# Patient Record
Sex: FEMALE | Race: BLACK OR AFRICAN AMERICAN | Employment: UNEMPLOYED | RURAL
[De-identification: names, ages, dates, MRNs, and addresses within clinical notes are randomized per-mention and may not be internally consistent; named-entity substitution may affect disease eponyms.]

---

## 2017-01-23 ENCOUNTER — TELEPHONE (OUTPATIENT)
Dept: FAMILY MEDICINE CLINIC | Age: 57
End: 2017-01-23

## 2017-01-23 DIAGNOSIS — M25.569 KNEE PAIN, UNSPECIFIED CHRONICITY, UNSPECIFIED LATERALITY: Primary | ICD-10-CM

## 2017-01-23 NOTE — TELEPHONE ENCOUNTER
Pt needs \"shots in her knees\". She has seen Dr. Aiden Apodaca. See would like a referral. Please advise.

## 2017-03-03 ENCOUNTER — OFFICE VISIT (OUTPATIENT)
Dept: FAMILY MEDICINE CLINIC | Age: 57
End: 2017-03-03

## 2017-03-03 VITALS
SYSTOLIC BLOOD PRESSURE: 136 MMHG | DIASTOLIC BLOOD PRESSURE: 80 MMHG | HEIGHT: 64 IN | TEMPERATURE: 97.8 F | RESPIRATION RATE: 20 BRPM | BODY MASS INDEX: 50.02 KG/M2 | WEIGHT: 293 LBS | OXYGEN SATURATION: 98 % | HEART RATE: 83 BPM

## 2017-03-03 DIAGNOSIS — R06.2 WHEEZING: ICD-10-CM

## 2017-03-03 DIAGNOSIS — J02.9 SORE THROAT: Primary | ICD-10-CM

## 2017-03-03 PROBLEM — J45.20 MILD INTERMITTENT ASTHMA WITHOUT COMPLICATION: Status: ACTIVE | Noted: 2017-03-03

## 2017-03-03 LAB
S PYO AG THROAT QL: NEGATIVE
VALID INTERNAL CONTROL?: YES

## 2017-03-03 RX ORDER — BENZONATATE 200 MG/1
200 CAPSULE ORAL
Qty: 30 CAP | Refills: 3 | Status: SHIPPED | OUTPATIENT
Start: 2017-03-03 | End: 2017-06-27 | Stop reason: SDUPTHER

## 2017-03-03 RX ORDER — QUINAPRIL 20 MG/1
20 TABLET ORAL
Qty: 30 TAB | Refills: 6 | Status: SHIPPED | OUTPATIENT
Start: 2017-03-03 | End: 2017-11-08 | Stop reason: SDUPTHER

## 2017-03-03 RX ORDER — FLUTICASONE PROPIONATE AND SALMETEROL 100; 50 UG/1; UG/1
1 POWDER RESPIRATORY (INHALATION) 2 TIMES DAILY
Qty: 1 INHALER | Refills: 0 | Status: SHIPPED | COMMUNITY
Start: 2017-03-03 | End: 2018-04-14

## 2017-03-03 RX ORDER — ALBUTEROL SULFATE 0.83 MG/ML
2.5 SOLUTION RESPIRATORY (INHALATION) ONCE
Qty: 1 EACH | Refills: 0
Start: 2017-03-03 | End: 2017-03-03

## 2017-03-03 RX ORDER — IPRATROPIUM BROMIDE 0.5 MG/2.5ML
0.5 SOLUTION RESPIRATORY (INHALATION) AS NEEDED
Qty: 2.5 ML | Refills: 0
Start: 2017-03-03 | End: 2018-01-10 | Stop reason: ALTCHOICE

## 2017-03-03 NOTE — PATIENT INSTRUCTIONS
Fluticasone/Salmeterol (By breathing)   Fluticasone Propionate (jvla-JPR-y-sone TCBT-lxb-bn-cece), Salmeterol Xinafoate (francisco-ME-ter-ol roe-GUC-fz-ate)  Prevents symptoms of asthma and chronic obstructive pulmonary disease (COPD). This medicine contains a corticosteroid. Brand Name(s):Advair Diskus 100/50, Advair Diskus 250/50, Advair Diskus 500/50, Advair /21, Advair /21, Advair HFA 45/21   There may be other brand names for this medicine. When This Medicine Should Not Be Used: This medicine is not right for everyone. Do not use this medicine if you had an allergic reaction to salmeterol, fluticasone, or milk proteins. Do not use this medicine to treat an asthma attack or a COPD flare-up. How to Use This Medicine:   Liquid Under Pressure, Powder Under Pressure, Disk  · Take your medicine as directed. Your dose may need to be changed several times to find what works best for you. Never use more medicine than your doctor prescribed. · This medicine should come with a Medication Guide. Ask your pharmacist for a copy if you do not have one. · Advair Diskus®:   ¨ The powder medicine is held in a foil blister package inside the Diskus® inhaler. The Diskus® pokes a hole in each blister one at a time when you push the lever. ¨ Do not use a spacer with the Diskus®. ¨ Keep the Diskus® inhaler closed when you are not using it. Keep the inhaler dry at all times. Do not blow into it. ¨ Before you inhale your dose, breathe out fully, trying to get as much air out of the lungs as possible. Hold the Diskus® level and away from your mouth. ¨ Open your mouth and breathe in quickly and deeply through the Diskus®. Do not breathe in through your nose. ¨ Remove the Diskus® from your mouth. Hold your breath for about 10 seconds or as long as possible, then breathe out slowly.   ¨ Throw the inhaler away 1 month after you remove it from the foil pouch or when the dose indicator reaches 0.  · Advair® HFA:   ¨ You will use this medicine with a device called a metered-dose inhaler. The inhaler fits on the medicine canister and turns the medicine into a fine spray that you breathe in through your mouth and to your lungs. You may be told to use a spacer, which is a tube that is placed between the inhaler and your mouth. Your caregiver will show you how to use your inhaler and the spacer (if needed). ¨ Shake the inhaler well just before each use. Avoid spraying this medicine into your eyes. ¨ Prime the inhaler before you use it for the first time. Shake well, then spray into the air, away from your face. Shake and spray a total of 4 times. If the inhaler has been dropped or has not been used for more than 4 weeks, do 2 sprays into the air before use. ¨ To inhale this medicine, breathe out fully, trying to get as much air out of the lungs as possible. Put the mouthpiece just in front of your mouth with the canister upright. ¨ Open your mouth and breathe in slowly and deeply (like yawning), and at the same time firmly press down on the top of the canister once. ¨ Hold your breath for about 5 to 10 seconds, and then breathe out slowly. ¨ If you are supposed to use more than one puff, wait 1 to 2 minutes before inhaling the second puff. Repeat these steps for the next puff, starting with shaking the inhaler. ¨ Throw away the inhaler when the dose counter reaches 000. · Rinse your mouth out with water after you inhale your medicine. Do not swallow the water. · Missed dose: Take a dose as soon as you remember. If it is almost time for your next dose, wait until then and take a regular dose. Do not take extra medicine to make up for a missed dose. · Keep the medicine in the foil pouch until you are ready to use it. Store at room temperature, away from heat and direct light. Do not freeze. · Store the canister at room temperature, away from heat and direct light. Do not freeze.  Do not keep this medicine inside a car where it could be exposed to extreme heat or cold. Do not poke holes in the canister or throw it into a fire, even if the canister is empty. · Ask your pharmacist, doctor, or health caregiver about the best way to dispose of the used medicine container and any leftover medicine. You will also need to throw away old medicine after the expiration date has passed. Drugs and Foods to Avoid:   Ask your doctor or pharmacist before using any other medicine, including over-the-counter medicines, vitamins, and herbal products. · Some foods and medicines can affect how fluticasone/salmeterol works. Tell your doctor if you have used medicine for depression or an MAO inhibitor within the past 2 weeks. · Tell your doctor if you are using any of the following:  ¨ Medicine to treat an infection (such as clarithromycin, itraconazole, ketoconazole, telithromycin)  ¨ Medicine to treat HIV or AIDS (such as atazanavir, indinavir, nelfinavir, ritonavir, saquinavir)  ¨ Diuretic (water pill)  ¨ Seizure medicine  ¨ Beta-blocker blood pressure medicine  Warnings While Using This Medicine:   · Tell your doctor if you are pregnant or breastfeeding, or if you have liver disease, heart disease, high blood pressure, heart rhythm problems, thyroid problems, seizures, osteoporosis, diabetes, cataracts, glaucoma. Tell your doctor about any immune system problems or infections, including herpes simplex in your eye, tuberculosis, or parasites.   · This medicine may cause the following problems:  ¨ Higher risk of asthma-related hospital stays and death  ¨ Increased trouble breathing right after use (paradoxical bronchospasm)  ¨ Higher risk of pneumonia in people who have COPD  ¨ Higher risk of infection, including fungus infection in the mouth (thrush)  ¨ Low bone mineral density, which may lead to osteoporosis  ¨ Cataracts, glaucoma, or other vision problems  ¨ Slow growth in children  ¨ Problems with the adrenal glands  · This medicine will not stop an asthma attack that has already started. You should have another medicine to use in case of an acute asthma attack or for sudden COPD flare-ups. · If any of your asthma medicines do not seem to be working as well as usual, call your doctor right away. Do not change your doses or stop using your medicines without asking your doctor. · Tell your doctor right away if you have been exposed to chickenpox or measles. · Call your doctor if your symptoms do not improve or if they get worse. · Tell any doctor or dentist who treats you that you are using this medicine. · Your doctor will check your progress and the effects of this medicine at regular visits. Keep all appointments. · Keep all medicine out of the reach of children. Never share your medicine with anyone. Possible Side Effects While Using This Medicine:   Call your doctor right away if you notice any of these side effects:  · Allergic reaction: Itching or hives, swelling in your face or hands, swelling or tingling in your mouth or throat, chest tightness, trouble breathing  · Chest pain, trouble breathing  · Dry mouth, increased thirst, muscle cramps, nausea or vomiting, uneven heartbeat  · Eye pain or trouble seeing  · Fast, pounding, or uneven heartbeat  · Fever, chills, cough, runny or stuffy nose, sore throat, and body aches  · Tremors, nervousness, or shaking  · Unusual tiredness or weakness  · Worse breathing problems  If you notice these less serious side effects, talk with your doctor:   · Headache  · Hoarseness, voice changes, sore throat  · Runny or stuffy nose  · White patches inside the mouth or throat  If you notice other side effects that you think are caused by this medicine, tell your doctor. Call your doctor for medical advice about side effects.  You may report side effects to FDA at 0-289-FDA-7538  © 2016 [address] is for End User's use only and may not be sold, redistributed or otherwise used for commercial purposes. The above information is an  only. It is not intended as medical advice for individual conditions or treatments. Talk to your doctor, nurse or pharmacist before following any medical regimen to see if it is safe and effective for you.

## 2017-03-03 NOTE — MR AVS SNAPSHOT
Visit Information Date & Time Provider Department Dept. Phone Encounter #  
 3/3/2017  9:00 AM Sumeet Diana NP [place] [id number] [facility] [phone] [id number] Follow-up Instructions Return if symptoms worsen or fail to improve. Upcoming Health Maintenance Date Due Hepatitis C Screening 1960 EYE EXAM RETINAL OR DILATED Q1 12/13/1969 Pneumococcal 19-64 Medium Risk (1 of 1 - PPSV23) 12/13/1978 DTaP/Tdap/Td series (1 - Tdap) 12/13/1980 PAP AKA CERVICAL CYTOLOGY 12/13/1980 INFLUENZA AGE 9 TO ADULT 8/1/2016 HEMOGLOBIN A1C Q6M 2/5/2017 FOOT EXAM Q1 8/5/2017 MICROALBUMIN Q1 8/5/2017 LIPID PANEL Q1 8/5/2017 FOBT Q 1 YEAR AGE 50-75 8/22/2017 BREAST CANCER SCRN MAMMOGRAM 1/31/2019 Allergies as of 3/3/2017  Review Complete On: 3/3/2017 By: Edita Kim RN No Known Allergies Current Immunizations  Never Reviewed No immunizations on file. Not reviewed this visit You Were Diagnosed With   
  
 Codes Comments Sore throat    -  Primary ICD-10-CM: J02.9 ICD-9-CM: 519 Wheezing     ICD-10-CM: R06.2 ICD-9-CM: 786.07 Vitals BP  
  
  
  
  
  
 136/80 (BP 1 Location: Left arm, BP Patient Position: Sitting) Vitals History BMI and BSA Data Body Mass Index Body Surface Area  
 53.69 kg/m 2 2.53 m 2 Preferred Pharmacy Pharmacy Name Phone [facility] [place] [address] [phone] Your Updated Medication List  
  
   
This list is accurate as of: 3/3/17 10:19 AM.  Always use your most recent med list.  
  
  
  
  
 * albuterol 90 mcg/actuation inhaler Commonly known as:  PROVENTIL HFA, VENTOLIN HFA, PROAIR HFA Take 2 Puffs by inhalation every four (4) hours as needed for Wheezing or Shortness of Breath. * albuterol 2.5 mg /3 mL (0.083 %) nebulizer solution Commonly known as:  PROVENTIL VENTOLIN  
 3 mL by Nebulization route once for 1 dose. benzonatate 200 mg capsule Commonly known as:  TESSALON Take 1 Cap by mouth three (3) times daily as needed for Cough for up to 7 days. dilTIAZem  mg ER capsule Commonly known as:  CARDIZEM CD Take 1 Cap by mouth daily. ferrous sulfate 325 mg (65 mg iron) tablet Take 1 Tab by mouth Daily (before breakfast). Indications: IRON DEFICIENCY ANEMIA  
  
 fluticasone 50 mcg/actuation nasal spray Commonly known as:  FLONASE  
2 sprays per nostril daily  
  
 hydroCHLOROthiazide 25 mg tablet Commonly known as:  HYDRODIURIL  
TAKE ONE TABLET BY MOUTH EVERY DAY  
  
 ipratropium 0.02 % nebulizer solution Commonly known as:  ATROVENT  
2.5 mL by Nebulization route as needed for Wheezing. Indications: MAINTENANCE THERAPY FOR ASTHMA  
  
 metFORMIN 500 mg tablet Commonly known as:  GLUCOPHAGE Take 1 Tab by mouth two (2) times daily (with meals). montelukast 10 mg tablet Commonly known as:  SINGULAIR Take 1 Tab by mouth daily. quinapril 20 mg tablet Commonly known as:  ACCUPRIL Take 1 Tab by mouth nightly. Indications: hypertension * Notice: This list has 2 medication(s) that are the same as other medications prescribed for you. Read the directions carefully, and ask your doctor or other care provider to review them with you. Prescriptions Sent to Pharmacy Refills  
 quinapril (ACCUPRIL) 20 mg tablet 6 Sig: Take 1 Tab by mouth nightly. Indications: hypertension Class: Normal  
 Pharmacy: [address] [place] [address], [address] [place] [address] [facility]: [phone] Route: Oral  
 benzonatate (TESSALON) 200 mg capsule 3 Sig: Take 1 Cap by mouth three (3) times daily as needed for Cough for up to 7 days. Class: Normal  
 Pharmacy: [address] [facility], [address] [place] [address] [address] Ph #: [phone] Route: Oral  
  
We Performed the Following ALBUTEROL, INHAL. SOL., FDA-APPROVED FINAL, NON-COMPOUND UNIT DOSE, 1 MG [ [facility]] AMB POC RAPID STREP A [48459 CPT(R)] INHAL RX, AIRWAY OBST/DX SPUTUM INDUCT [id number] CPT(R)] IPRATROPIUM BROMIDE NON-COMP [ [facility]] [place] INHAL RX, AIRWAY OBST/DX SPUTUM INDUCT [id number] CPT(R)] Follow-up Instructions Return if symptoms worsen or fail to improve. Introducing [facility]! [facility] introduces [employer] patient portal. Now you can access parts of your medical record, email your doctor's office, and request medication refills online. 1. In your internet browser, go to [email or web address] 2. Click on the First Time User? Click Here link in the Sign In box. You will see the New Member Sign Up page. 3. Enter your ZeaVision Access Code exactly as it appears below. You will not need to use this code after youve completed the sign-up process. If you do not sign up before the expiration date, you must request a new code. · ZeaVision Access Code: [email or web address] Expires: 6/1/2017 10:19 AM 
 
4. Enter the last four digits of your Social Security Number (xxxx) and Date of Birth (mm/dd/yyyy) as indicated and click Submit. You will be taken to the next sign-up page. 5. Create a ZeaVision ID. This will be your ZeaVision login ID and cannot be changed, so think of one that is secure and easy to remember. 6. Create a Ze[employer]Vision password. You can change your password at any time. 7. Enter your Password Reset Question and Answer. This can be used at a later time if you forget your password. 8. Enter your e-mail address. You will receive e-mail notification when new information is available in [address] 9. Click Sign Up. You can now view and download portions of your medical record. 10. Click the Download Summary menu link to download a portable copy of your medical information.  
 
If you have questions, please visit the Frequently Asked Questions section of the [employer]. Remember, [email or web address] is NOT to be used for urgent needs. For medical emergencies, dial 911. Now available from your iPhone and Android! Please provide this summary of care documentation to your next provider. Your primary care clinician is listed as Marcela Cuenca. If you have any questions after today's visit, please call [phone].

## 2017-03-04 NOTE — PROGRESS NOTES
Subjective:     Natalia Brush is a 61 y.o. female who presents today with the following:  Chief Complaint   Patient presents with    Sore Throat    Cold Symptoms     cough and wheeze   HPI: Sore throat, nasal drainage wheezing and nonproductive cough  since Saturday. Taking allergy and asthma medication as prescribed. .     ROS:  Gen: denies fever, chills, fatigue, weight loss, weight gain  HEENT:denies blurry vision,positive  nasal congestion, sore throat  Resp: denies dypsnea, positive cough, wheezing  CV: denies chest pain radiating to the jaws or arms, palpitations, lower extremity edema  Abd: denies nausea, vomiting, diarrhea, constipation  Neuro: denies numbness/tingling  Endo: denies polyuria, polydipsia, heat/cold intolerance  Heme: no lymphadenopathy    No Known Allergies      Current Outpatient Prescriptions:     quinapril (ACCUPRIL) 20 mg tablet, Take 1 Tab by mouth nightly. Indications: hypertension, Disp: 30 Tab, Rfl: 6    benzonatate (TESSALON) 200 mg capsule, Take 1 Cap by mouth three (3) times daily as needed for Cough for up to 7 days. , Disp: 30 Cap, Rfl: 3    ipratropium (ATROVENT) 0.02 % nebulizer solution, 2.5 mL by Nebulization route as needed for Wheezing. Indications: MAINTENANCE THERAPY FOR ASTHMA, Disp: 2.5 mL, Rfl: 0    fluticasone-salmeterol (ADVAIR) 100-50 mcg/dose diskus inhaler, Take 1 Puff by inhalation two (2) times a day. Indications: MAINTENANCE THERAPY FOR ASTHMA, Disp: 1 Inhaler, Rfl: 0    montelukast (SINGULAIR) 10 mg tablet, Take 1 Tab by mouth daily. , Disp: 30 Tab, Rfl: 3    fluticasone (FLONASE) 50 mcg/actuation nasal spray, 2 sprays per nostril daily, Disp: 1 Bottle, Rfl: 5    hydrochlorothiazide (HYDRODIURIL) 25 mg tablet, TAKE ONE TABLET BY MOUTH EVERY DAY, Disp: 90 Tab, Rfl: 1    diltiazem CD (CARDIZEM CD) 120 mg ER capsule, Take 1 Cap by mouth daily. , Disp: 30 Cap, Rfl: 3    metFORMIN (GLUCOPHAGE) 500 mg tablet, Take 1 Tab by mouth two (2) times daily (with meals). , Disp: 60 Tab, Rfl: 3    ferrous sulfate 325 mg (65 mg iron) tablet, Take 1 Tab by mouth Daily (before breakfast). Indications: IRON DEFICIENCY ANEMIA, Disp: 90 Tab, Rfl: 1    albuterol (PROVENTIL HFA, VENTOLIN HFA, PROAIR HFA) 90 mcg/actuation inhaler, Take 2 Puffs by inhalation every four (4) hours as needed for Wheezing or Shortness of Breath., Disp: 1 Inhaler, Rfl: 5    Past Medical History:   Diagnosis Date    Anemia     Diabetes mellitus type II     Hypertension        Past Surgical History:   Procedure Laterality Date    HX GI  2006     bowel surgery for infection s/p colostomy and then removal     HX PELVIC LAPAROSCOPY         History   Smoking Status    Former Smoker   Smokeless Tobacco    Not on file       Social History     Social History    Marital status:      Spouse name: N/A    Number of children: N/A    Years of education: N/A     Social History Main Topics    Smoking status: Former Smoker    Smokeless tobacco: None    Alcohol use No    Drug use: No    Sexual activity: Not Asked     Other Topics Concern    None     Social History Narrative       Family History   Problem Relation Age of Onset    Cancer Mother      breast    Diabetes Mother     Hypertension Sister     Diabetes Sister          Objective:     Visit Vitals    /80 (BP 1 Location: Left arm, BP Patient Position: Sitting)    Pulse 83    Temp 97.8 °F (36.6 °C) (Temporal)    Resp 20    Ht 5' 4\" (1.626 m)    Wt 312 lb 12.8 oz (141.9 kg)    SpO2 98%    BMI 53.69 kg/m2     Body mass index is 53.69 kg/(m^2). General: Alert and oriented. No acute distress. Well nourished  HEENT :  Ears:TMs are normal. Canals are clear. Eyes: pupils equal, round, react to light and accommodation. Extra ocular movements intact. Nose: patent. Mouth and throat is clear. Neck:supple full range of motion no thyromegaly. Trachea midline, No carotid bruits.  No significant lymphadenopathy  Lungs::Diffuse expiratory wheezing though out prior to nebulizer treatment  clear to auscultation without wheezes, rales, or rhonchi after nebulizer treatment  Heart :RRR, S1 & S2 are jack intensity. No murmur; no gallop      Results for orders placed or performed in visit on 03/03/17   AMB POC RAPID STREP A   Result Value Ref Range    VALID INTERNAL CONTROL POC Yes     Group A Strep Ag Negative Negative       Results for orders placed or performed in visit on 03/03/17   AMB POC RAPID STREP A   Result Value Ref Range    VALID INTERNAL CONTROL POC Yes     Group A Strep Ag Negative Negative       Assessment/ Plan:     Arun Greco was seen today for sore throat and cold symptoms. Diagnoses and all orders for this visit:    Sore throat  -     AMB POC RAPID STREP A    Wheezing  -     ALBUTEROL, INHAL. XDB.()  -     INHAL RX, AIRWAY OBST/DX SPUTUM INDUCT ([id number])  -     IPRATROPIUM BROMIDE NON-COMP  -     [place] INHAL RX, AIRWAY OBST/DX SPUTUM INDUCT    Other orders  -     quinapril (ACCUPRIL) 20 mg tablet; Take 1 Tab by mouth nightly. Indications: hypertension  -     benzonatate (TESSALON) 200 mg capsule; Take 1 Cap by mouth three (3) times daily as needed for Cough for up to 7 days. -     albuterol (PROVENTIL VENTOLIN) 2.5 mg /3 mL (0.083 %) nebulizer solution; 3 mL by Nebulization route once for 1 dose. -     ipratropium (ATROVENT) 0.02 % nebulizer solution; 2.5 mL by Nebulization route as needed for Wheezing. Indications: MAINTENANCE THERAPY FOR ASTHMA  -     fluticasone-salmeterol (ADVAIR) 100-50 mcg/dose diskus inhaler; Take 1 Puff by inhalation two (2) times a day. Indications: MAINTENANCE THERAPY FOR ASTHMA         1. Sore throat    2.  Wheezing        Orders Placed This Encounter    INHAL RX, AIRWAY OBST/DX SPUTUM INDUCT ([id number])    AMB POC RAPID STREP A    ALBUTEROL, INHAL. VCJ.()     Order Specific Question:   Dose     Answer:   3 mg     Order Specific Question:   Site     Answer:   OTHER     Comments:   inhaltion Order Specific Question:   Expiration Date     Answer:   3/31/2018     Order Specific Question:   Lot#     Answer:   [id number]     Order Specific Question:        Answer:   [employer]     Order Specific Question:   Charge Quantity? Answer:   1     Order Specific Question:   Perfomed by/Witnessed by: Answer:   [email or web address] lpn     Order Specific Question:   NDC#     Answer:   4515-3914-80    IPRATROPIUM BROMIDE NON-COMP    [place] INHAL RX, AIRWAY OBST/DX SPUTUM INDUCT    quinapril (ACCUPRIL) 20 mg tablet     Sig: Take 1 Tab by mouth nightly. Indications: hypertension     Dispense:  30 Tab     Refill:  6    benzonatate (TESSALON) 200 mg capsule     Sig: Take 1 Cap by mouth three (3) times daily as needed for Cough for up to 7 days. Dispense:  30 Cap     Refill:  3    albuterol (PROVENTIL VENTOLIN) 2.5 mg /3 mL (0.083 %) nebulizer solution     Sig: 3 mL by Nebulization route once for 1 dose. Dispense:  1 Each     Refill:  0    ipratropium (ATROVENT) 0.02 % nebulizer solution     Si.5 mL by Nebulization route as needed for Wheezing. Indications: MAINTENANCE THERAPY FOR ASTHMA     Dispense:  2.5 mL     Refill:  0    fluticasone-salmeterol (ADVAIR) 100-50 mcg/dose diskus inhaler     Sig: Take 1 Puff by inhalation two (2) times a day. Indications: MAINTENANCE THERAPY FOR ASTHMA     Dispense:  1 Inhaler     Refill:  0     Order Specific Question:   Expiration Date     Answer:   3/31/2018     Order Specific Question:   Lot#     Answer:   [id number]     Order Specific Question:        Answer:   [employer] ana.     Order Specific Question:   NDC#     Answer:   3688-6480-00     RTO as needed    Verbal and written instructions (see AVS) provided.  Patient expresses understanding of diagnosis and treatment plan.     BENNIE Falcon

## 2017-03-31 DIAGNOSIS — J40 BRONCHITIS: ICD-10-CM

## 2017-04-02 RX ORDER — MONTELUKAST SODIUM 10 MG/1
TABLET ORAL
Qty: 30 TAB | Refills: 0 | Status: SHIPPED | OUTPATIENT
Start: 2017-04-02 | End: 2017-05-30 | Stop reason: SDUPTHER

## 2017-04-06 RX ORDER — HYDROCHLOROTHIAZIDE 25 MG/1
TABLET ORAL
Qty: 90 TAB | Refills: 1 | Status: SHIPPED | OUTPATIENT
Start: 2017-04-06 | End: 2017-07-12 | Stop reason: SDUPTHER

## 2017-05-30 DIAGNOSIS — J40 BRONCHITIS: ICD-10-CM

## 2017-05-30 RX ORDER — MONTELUKAST SODIUM 10 MG/1
TABLET ORAL
Qty: 30 TAB | Refills: 0 | Status: SHIPPED | OUTPATIENT
Start: 2017-05-30 | End: 2017-07-05 | Stop reason: SDUPTHER

## 2017-05-30 RX ORDER — METFORMIN HYDROCHLORIDE 500 MG/1
TABLET ORAL
Qty: 60 TAB | Refills: 0 | Status: SHIPPED | OUTPATIENT
Start: 2017-05-30 | End: 2017-06-27 | Stop reason: SDUPTHER

## 2017-06-28 RX ORDER — BENZONATATE 200 MG/1
CAPSULE ORAL
Qty: 30 CAP | Refills: 0 | Status: SHIPPED | OUTPATIENT
Start: 2017-06-28 | End: 2017-07-26 | Stop reason: SDUPTHER

## 2017-06-28 RX ORDER — METFORMIN HYDROCHLORIDE 500 MG/1
TABLET ORAL
Qty: 60 TAB | Refills: 0 | Status: SHIPPED | OUTPATIENT
Start: 2017-06-28 | End: 2017-07-30 | Stop reason: SDUPTHER

## 2017-07-05 DIAGNOSIS — J40 BRONCHITIS: ICD-10-CM

## 2017-07-05 RX ORDER — MONTELUKAST SODIUM 10 MG/1
TABLET ORAL
Qty: 30 TAB | Refills: 0 | Status: SHIPPED | OUTPATIENT
Start: 2017-07-05 | End: 2017-08-04 | Stop reason: SDUPTHER

## 2017-07-07 RX ORDER — DILTIAZEM HYDROCHLORIDE 120 MG/1
120 CAPSULE, COATED, EXTENDED RELEASE ORAL DAILY
Qty: 90 CAP | Refills: 1 | Status: SHIPPED | OUTPATIENT
Start: 2017-07-07 | End: 2017-07-12 | Stop reason: SDUPTHER

## 2017-07-07 NOTE — TELEPHONE ENCOUNTER
Lab Results   Component Value Date/Time    Sodium 137 08/05/2016 10:39 AM    Potassium 4.8 08/05/2016 10:39 AM    Chloride 95 08/05/2016 10:39 AM    CO2 25 08/05/2016 10:39 AM    Glucose 100 08/05/2016 10:39 AM    BUN 16 08/05/2016 10:39 AM    Creatinine 0.56 08/05/2016 10:39 AM    BUN/Creatinine ratio 29 08/05/2016 10:39 AM    GFR est  08/05/2016 10:39 AM    GFR est non- 08/05/2016 10:39 AM    Calcium 8.8 08/05/2016 10:39 AM    Bilirubin, total 0.2 08/05/2016 10:39 AM    AST (SGOT) 14 08/05/2016 10:39 AM    Alk.  phosphatase 93 08/05/2016 10:39 AM    Protein, total 6.4 08/05/2016 10:39 AM    Albumin 3.8 08/05/2016 10:39 AM    A-G Ratio 1.5 08/05/2016 10:39 AM    ALT (SGPT) 11 08/05/2016 10:39 AM

## 2017-07-12 ENCOUNTER — OFFICE VISIT (OUTPATIENT)
Dept: FAMILY MEDICINE CLINIC | Age: 57
End: 2017-07-12

## 2017-07-12 VITALS
HEIGHT: 64 IN | RESPIRATION RATE: 20 BRPM | TEMPERATURE: 99.2 F | DIASTOLIC BLOOD PRESSURE: 84 MMHG | HEART RATE: 82 BPM | BODY MASS INDEX: 50.02 KG/M2 | OXYGEN SATURATION: 97 % | WEIGHT: 293 LBS | SYSTOLIC BLOOD PRESSURE: 168 MMHG

## 2017-07-12 DIAGNOSIS — J00 ACUTE NASOPHARYNGITIS: ICD-10-CM

## 2017-07-12 DIAGNOSIS — I10 ESSENTIAL HYPERTENSION: ICD-10-CM

## 2017-07-12 DIAGNOSIS — R60.0 LOCALIZED EDEMA: ICD-10-CM

## 2017-07-12 DIAGNOSIS — E66.01 MORBID OBESITY WITH BMI OF 50.0-59.9, ADULT (HCC): Primary | ICD-10-CM

## 2017-07-12 RX ORDER — HYDROCHLOROTHIAZIDE 50 MG/1
TABLET ORAL
Qty: 90 TAB | Refills: 1
Start: 2017-07-12 | End: 2018-01-03 | Stop reason: SDUPTHER

## 2017-07-12 RX ORDER — DILTIAZEM HYDROCHLORIDE 240 MG/1
240 CAPSULE, COATED, EXTENDED RELEASE ORAL DAILY
Qty: 90 CAP | Refills: 1 | Status: SHIPPED | OUTPATIENT
Start: 2017-07-12 | End: 2018-01-03 | Stop reason: SDUPTHER

## 2017-07-12 RX ORDER — IPRATROPIUM BROMIDE 42 UG/1
1 SPRAY, METERED NASAL 4 TIMES DAILY
Qty: 15 ML | Refills: 0 | Status: SHIPPED | OUTPATIENT
Start: 2017-07-12 | End: 2018-04-14

## 2017-07-12 NOTE — MR AVS SNAPSHOT
Visit Information Date & Time Provider Department Dept. Phone Encounter #  
 7/12/2017  3:00 PM Zuleyma Koroma MD [facility] [phone] [id number] Your Appointments 8/9/2017  9:40 AM  
Follow Up with Zuleyma Koroma MD  
[facility] [address] [facility]) Appt Note: 4 week f/u  
 [address] [facility] [phone]  
  
   
 [address] [facility] [address] [phone] Upcoming Health Maintenance Date Due Hepatitis C Screening 1960 EYE EXAM RETINAL OR DILATED Q1 12/13/1969 Pneumococcal 19-64 Medium Risk (1 of 1 - PPSV23) 12/13/1978 DTaP/Tdap/Td series (1 - Tdap) 12/13/1980 PAP AKA CERVICAL CYTOLOGY 12/13/1980 HEMOGLOBIN A1C Q6M 2/5/2017 FOOT EXAM Q1 8/5/2017 MICROALBUMIN Q1 8/5/2017 LIPID PANEL Q1 8/5/2017 FOBT Q 1 YEAR AGE 50-75 8/22/2017 INFLUENZA AGE 9 TO ADULT 8/1/2017 BREAST CANCER SCRN MAMMOGRAM 1/31/2019 Allergies as of 7/12/2017  Review Complete On: 7/12/2017 By: Zuleyma Koroma MD  
 No Known Allergies Current Immunizations  Never Reviewed No immunizations on file. Not reviewed this visit Vitals BP Pulse Temp Resp Height(growth percentile) Weight(growth percentile) 168/84 (BP 1 Location: Left arm, BP Patient Position: Sitting) 82 99.2 °F (37.3 °C) (Oral) 20 5' 4\" (1.626 m) 315 lb (142.9 kg) SpO2 BMI OB Status Smoking Status 97% 54.07 kg/m2 Postmenopausal Former Smoker Vitals History BMI and BSA Data Body Mass Index Body Surface Area 54.07 kg/m 2 2.54 m 2 Preferred Pharmacy Pharmacy Name Phone [facility] [place], [place] - [address] [phone] Your Updated Medication List  
  
   
This list is accurate as of: 7/12/17  4:20 PM.  Always use your most recent med list.  
  
  
  
  
 albuterol 90 mcg/actuation inhaler Commonly known as:  PROVENTIL HFA, VENTOLIN HFA, PROAIR HFA Take 2 Puffs by inhalation every four (4) hours as needed for Wheezing or Shortness of Breath. benzonatate 200 mg capsule Commonly known as:  TESSALON  
TAKE ONE CAPSULE BY MOUTH THREE TIMES DAILY AS NEEDED FOR COUGH FOR  UP  TO  7  DAYS  
  
 dilTIAZem  mg ER capsule Commonly known as:  CARDIZEM CD Take 1 Cap by mouth daily. ferrous sulfate 325 mg (65 mg iron) tablet Take 1 Tab by mouth Daily (before breakfast). Indications: IRON DEFICIENCY ANEMIA  
  
 fluticasone 50 mcg/actuation nasal spray Commonly known as:  FLONASE  
2 sprays per nostril daily  
  
 fluticasone-salmeterol 100-50 mcg/dose diskus inhaler Commonly known as:  ADVAIR Take 1 Puff by inhalation two (2) times a day. Indications: MAINTENANCE THERAPY FOR ASTHMA  
  
 hydroCHLOROthiazide 50 mg tablet Commonly known as:  HYDRODIURIL  
TAKE ONE TABLET BY MOUTH EVERY DAY  
  
 * ipratropium 0.02 % nebulizer solution Commonly known as:  ATROVENT  
2.5 mL by Nebulization route as needed for Wheezing. Indications: MAINTENANCE THERAPY FOR ASTHMA * ipratropium 0.06 % nasal spray Commonly known as:  ATROVENT  
1 [place] by Both Nostrils route four (4) times daily. Indications: RHINORRHEA * metFORMIN 500 mg tablet Commonly known as:  GLUCOPHAGE Take 1 Tab by mouth two (2) times daily (with meals). * metFORMIN 500 mg tablet Commonly known as:  GLUCOPHAGE  
TAKE ONE TABLET BY MOUTH TWICE DAILY WITH MEALS  
  
 montelukast 10 mg tablet Commonly known as:  SINGULAIR  
TAKE ONE TABLET BY MOUTH ONCE DAILY  
  
 quinapril 20 mg tablet Commonly known as:  ACCUPRIL Take 1 Tab by mouth nightly. Indications: hypertension * Notice: This list has 4 medication(s) that are the same as other medications prescribed for you. Read the directions carefully, and ask your doctor or other care provider to review them with you. Prescriptions Sent to Pharmacy Refills  
 dilTIAZem CD (CARDIZEM CD) 240 mg ER capsule 1 Sig: Take 1 Cap by mouth daily. Class: Normal  
 Pharmacy: [facility] Ph #: [phone] Route: Oral  
 ipratropium (ATROVENT) 0.06 % nasal spray 0 [place] by Both Nostrils route four (4) times daily. Indications: RHINORRHEA Class: Normal  
 Pharmacy: [facility] Ph #: [phone] Route: Both Nostrils Introducing [facility]! Carrie Chan introduces Nebula patient portal. Now you can access parts of your medical record, email your doctor's office, and request medication refills online. 1. In your internet browser, go to [email or web address] 2. Click on the First Time User? Click Here link in the Sign In box. You will see the New Member Sign Up page. 3. Enter your Nebula Access Code exactly as it appears below. You will not need to use this code after youve completed the sign-up process. If you do not sign up before the expiration date, you must request a new code. · Nebula Access Code: [email or web address] Expires: 10/10/2017  4:20 PM 
 
4. Enter the last four digits of your Social Security Number (xxxx) and Date of Birth (mm/dd/yyyy) as indicated and click Submit. You will be taken to the next sign-up page. 5. Create a Magneto[employer] ID. This will be your Nebula login ID and cannot be changed, so think of one that is secure and easy to remember. 6. Create a Nebula password. You can change your password at any time. 7. Enter your Password Reset Question and Answer. This can be used at a later time if you forget your password. 8. Enter your e-mail address. You will receive e-mail notification when new information is available in [address] 9. Click Sign Up. You can now view and download portions of your medical record. 10. Click the Download Summary menu link to download a portable copy of your medical information. If you have questions, please visit the Frequently Asked Questions section of the BuddyBounce website. Remember, BuddyBounce is NOT to be used for urgent needs. For medical emergencies, dial 911. Now available from your iPhone and Android! Please provide this summary of care documentation to your next provider. Your primary care clinician is listed as Kenyetta Emmanuel. If you have any questions after today's visit, please call [phone].

## 2017-07-12 NOTE — PROGRESS NOTES
Jacqueline Langley is a 61 y.o. female who presents with the following:  Chief Complaint   Patient presents with    Foot Swelling    Cold Symptoms       Foot Swelling    The history is provided by the patient (Morbidly obese lady comes in on this very hot day complaining that her feet and ankles are swelling. The patient weighs 315 pounds. ). Cold Symptoms   The history is provided by the patient (Patient has a 3 day history of upper respiratory tract coryza postnasal drip and cough. There is no fever. ). Associated symptoms include rhinorrhea and sore throat. Pertinent negatives include no chest pain, no chills, no sweats, no eye redness, no myalgias, no shortness of breath, no wheezing, no nausea and no vomiting. No Known Allergies    Current Outpatient Prescriptions   Medication Sig    dilTIAZem CD (CARDIZEM CD) 240 mg ER capsule Take 1 Cap by mouth daily.  hydroCHLOROthiazide (HYDRODIURIL) 50 mg tablet TAKE ONE TABLET BY MOUTH EVERY DAY    ipratropium (ATROVENT) 0.06 % nasal spray 1 [place] by Both Nostrils route four (4) times daily. Indications: RHINORRHEA    montelukast (SINGULAIR) 10 mg tablet TAKE ONE TABLET BY MOUTH ONCE DAILY    metFORMIN (GLUCOPHAGE) 500 mg tablet TAKE ONE TABLET BY MOUTH TWICE DAILY WITH MEALS    benzonatate (TESSALON) 200 mg capsule TAKE ONE CAPSULE BY MOUTH THREE TIMES DAILY AS NEEDED FOR COUGH FOR  UP  TO  7  DAYS    quinapril (ACCUPRIL) 20 mg tablet Take 1 Tab by mouth nightly. Indications: hypertension    ipratropium (ATROVENT) 0.02 % nebulizer solution 2.5 mL by Nebulization route as needed for Wheezing. Indications: MAINTENANCE THERAPY FOR ASTHMA    fluticasone-salmeterol (ADVAIR) 100-50 mcg/dose diskus inhaler Take 1 Puff by inhalation two (2) times a day.  Indications: MAINTENANCE THERAPY FOR ASTHMA    fluticasone (FLONASE) 50 mcg/actuation nasal spray 2 sprays per nostril daily    metFORMIN (GLUCOPHAGE) 500 mg tablet Take 1 Tab by mouth two (2) times daily (with meals).  ferrous sulfate 325 mg (65 mg iron) tablet Take 1 Tab by mouth Daily (before breakfast). Indications: IRON DEFICIENCY ANEMIA    albuterol (PROVENTIL HFA, VENTOLIN HFA, PROAIR HFA) 90 mcg/actuation inhaler Take 2 Puffs by inhalation every four (4) hours as needed for Wheezing or Shortness of Breath. No current facility-administered medications for this visit. Past Medical History:   Diagnosis Date    Anemia     Diabetes mellitus type II     Hypertension        Past Surgical History:   Procedure Laterality Date    HX GI  2006     bowel surgery for infection s/p colostomy and then removal     HX PELVIC LAPAROSCOPY         Family History   Problem Relation Age of Onset    Cancer Mother      breast    Diabetes Mother     Hypertension Sister     Diabetes Sister        Social History     Social History    Marital status:      Spouse name: N/A    Number of children: N/A    Years of education: N/A     Social History Main Topics    Smoking status: Former Smoker    Smokeless tobacco: Never Used    Alcohol use No    Drug use: No    Sexual activity: Not Asked     Other Topics Concern    None     Social History Narrative       Review of Systems   Constitutional: Negative for chills. HENT: Positive for rhinorrhea and sore throat. Eyes: Negative for redness. Respiratory: Negative for shortness of breath and wheezing. Cardiovascular: Negative for chest pain. Gastrointestinal: Negative for nausea and vomiting. Musculoskeletal: Negative for myalgias. Visit Vitals    /84 (BP 1 Location: Left arm, BP Patient Position: Sitting)    Pulse 82    Temp 99.2 °F (37.3 °C) (Oral)    Resp 20    Ht 5' 4\" (1.626 m)    Wt 315 lb (142.9 kg)    SpO2 97%    BMI 54.07 kg/m2     Physical Exam   Constitutional: She is oriented to person, place, and time. No distress.    Has coryza that is clear - mild distress-the patient has morbid obesity with 1+ edema around the left ankle and trace around the right. HENT:   Head: Normocephalic and atraumatic. Right Ear: External ear normal.   Left Ear: External ear normal.   Mouth/Throat: No oropharyngeal exudate. Red mm's in the nose and tht   Eyes: Conjunctivae and EOM are normal. Pupils are equal, round, and reactive to light. Right eye exhibits no discharge. Left eye exhibits no discharge. Neck: Normal range of motion. Neck supple. No tracheal deviation present. No thyromegaly present. Cardiovascular: Normal rate, regular rhythm, normal heart sounds and intact distal pulses. Exam reveals no gallop and no friction rub. No murmur heard. Pulmonary/Chest: Effort normal and breath sounds normal. No stridor. No respiratory distress. She has no wheezes. She has no rales. She exhibits no tenderness. Abdominal: She exhibits no distension and no mass. There is no tenderness. There is no guarding. Musculoskeletal: She exhibits no edema or tenderness. Lymphadenopathy:     She has no cervical adenopathy. Neurological: She is alert and oriented to person, place, and time. She has normal reflexes. Gait normal.   Skin: Skin is warm and dry. No rash noted. She is not diaphoretic. No erythema. Psychiatric: Mood, memory, affect and judgment normal.         ICD-10-CM ICD-9-CM    1. Morbid obesity with BMI of 50.0-59.9, adult ([facility]) E66.01 278.01     Z68.43 V85.43    2. Essential hypertension I10 401.9 dilTIAZem CD (CARDIZEM CD) 240 mg ER capsule      hydroCHLOROthiazide (HYDRODIURIL) 50 mg tablet   3. Localized edema R60.0 782.3    4. Acute nasopharyngitis J00 460 ipratropium (ATROVENT) 0.06 % nasal spray       Orders Placed This Encounter    dilTIAZem CD (CARDIZEM CD) 240 mg ER capsule     Sig: Take 1 Cap by mouth daily.      Dispense:  90 Cap     Refill:  1     Please schedule an appt for further refills    hydroCHLOROthiazide (HYDRODIURIL) 50 mg tablet     Sig: TAKE ONE TABLET BY MOUTH EVERY DAY     Dispense:  90 Tab Refill:  1    ipratropium (ATROVENT) 0.06 % nasal spray     [place] by Both Nostrils route four (4) times daily. Indications: RHINORRHEA     Dispense:  15 mL     Refill:  0    I explained to the patient if she is eating enough to maintain 315 pounds even though she is not using salt on the food there is sodium in the food and as she is eating more than twice the amount she should be eating she is getting more than twice the amount of sodium she should be eating and then this hot weather is going to make her ankle swell. Additionally the extra weight around her middle is compressing the inferior vena cava causing a back pressure on her legs which makes them puff even more. I explained to her by cutting back on her food she would be cutting back on the sodium and cutting back on the calories that are causing her weight to be excessive.     Follow-up Disposition: Not on Thiago Smalls MD

## 2017-07-13 ENCOUNTER — DOCUMENTATION ONLY (OUTPATIENT)
Dept: FAMILY MEDICINE CLINIC | Age: 57
End: 2017-07-13

## 2017-07-26 RX ORDER — BENZONATATE 200 MG/1
CAPSULE ORAL
Qty: 30 CAP | Refills: 0 | Status: SHIPPED | OUTPATIENT
Start: 2017-07-26 | End: 2017-08-15 | Stop reason: SDUPTHER

## 2017-07-31 RX ORDER — METFORMIN HYDROCHLORIDE 500 MG/1
TABLET ORAL
Qty: 60 TAB | Refills: 0 | Status: SHIPPED | OUTPATIENT
Start: 2017-07-31 | End: 2017-08-30 | Stop reason: SDUPTHER

## 2017-08-02 ENCOUNTER — OFFICE VISIT (OUTPATIENT)
Dept: FAMILY MEDICINE CLINIC | Age: 57
End: 2017-08-02

## 2017-08-02 VITALS
WEIGHT: 293 LBS | OXYGEN SATURATION: 98 % | HEIGHT: 65 IN | BODY MASS INDEX: 48.82 KG/M2 | SYSTOLIC BLOOD PRESSURE: 157 MMHG | DIASTOLIC BLOOD PRESSURE: 68 MMHG | HEART RATE: 80 BPM | RESPIRATION RATE: 18 BRPM | TEMPERATURE: 97.7 F

## 2017-08-02 DIAGNOSIS — J44.1 EXACERBATION OF COPD ASSOCIATED WITH VOLCANIC SMOG EXPOSURE (HCC): Primary | ICD-10-CM

## 2017-08-02 DIAGNOSIS — Z77.110 EXACERBATION OF COPD ASSOCIATED WITH VOLCANIC SMOG EXPOSURE (HCC): Primary | ICD-10-CM

## 2017-08-02 DIAGNOSIS — J01.00 ACUTE NON-RECURRENT MAXILLARY SINUSITIS: ICD-10-CM

## 2017-08-02 RX ORDER — LEVOFLOXACIN 500 MG/1
500 TABLET, FILM COATED ORAL DAILY
Qty: 10 TAB | Refills: 0 | Status: SHIPPED | OUTPATIENT
Start: 2017-08-02 | End: 2017-08-12

## 2017-08-02 RX ORDER — PREDNISONE 20 MG/1
TABLET ORAL
Qty: 30 TAB | Refills: 0 | Status: SHIPPED | OUTPATIENT
Start: 2017-08-02 | End: 2017-11-07 | Stop reason: ALTCHOICE

## 2017-08-02 NOTE — MR AVS SNAPSHOT
Visit Information Date & Time Provider Department Dept. Phone Encounter #  
 8/2/2017 10:20 AM Amaury Aguilera MD [facility] [phone] [id number] Upcoming Health Maintenance Date Due Hepatitis C Screening 1960 EYE EXAM RETINAL OR DILATED Q1 12/13/1969 Pneumococcal 19-64 Medium Risk (1 of 1 - PPSV23) 12/13/1978 DTaP/Tdap/Td series (1 - Tdap) 12/13/1980 PAP AKA CERVICAL CYTOLOGY 12/13/1980 HEMOGLOBIN A1C Q6M 2/5/2017 INFLUENZA AGE 9 TO ADULT 8/1/2017 FOOT EXAM Q1 8/5/2017 MICROALBUMIN Q1 8/5/2017 LIPID PANEL Q1 8/5/2017 FOBT Q 1 YEAR AGE 50-75 8/22/2017 BREAST CANCER SCRN MAMMOGRAM 1/31/2019 Allergies as of 8/2/2017  Review Complete On: 8/2/2017 By: Amaury Aguilera MD  
 No Known Allergies Current Immunizations  Never Reviewed No immunizations on file. Not reviewed this visit Vitals BP Pulse Temp Resp Height(growth percentile) Weight(growth percentile) 157/68 (BP 1 Location: Left arm, BP Patient Position: Sitting) 80 97.7 °F (36.5 °C) (Oral) 18 5' 5\" (1.651 m) 306 lb (138.8 kg) SpO2 BMI OB Status Smoking Status 98% 50.92 kg/m2 Postmenopausal Former Smoker Vitals History BMI and BSA Data Body Mass Index Body Surface Area 50.92 kg/m 2 2.52 m 2 Preferred Pharmacy Pharmacy Name Phone [facility] [place] [address] [address] [phone] Your Updated Medication List  
  
   
This list is accurate as of: 8/2/17 10:59 AM.  Always use your most recent med list.  
  
  
  
  
 albuterol 90 mcg/actuation inhaler Commonly known as:  PROVENTIL HFA, VENTOLIN HFA, PROAIR HFA Take 2 Puffs by inhalation every four (4) hours as needed for Wheezing or Shortness of Breath. benzonatate 200 mg capsule Commonly known as:  TESSALON  
TAKE ONE CAPSULE BY MOUTH THREE TIMES DAILY AS NEEDED FOR  COUGH  FOR  UP  TO  7  DAYS dilTIAZem  mg ER capsule Commonly known as:  CARDIZEM CD Take 1 Cap by mouth daily. ferrous sulfate 325 mg (65 mg iron) tablet Take 1 Tab by mouth Daily (before breakfast). Indications: IRON DEFICIENCY ANEMIA  
  
 fluticasone 50 mcg/actuation nasal spray Commonly known as:  FLONASE  
2 sprays per nostril daily  
  
 fluticasone-salmeterol 100-50 mcg/dose diskus inhaler Commonly known as:  ADVAIR Take 1 Puff by inhalation two (2) times a day. Indications: MAINTENANCE THERAPY FOR ASTHMA  
  
 guaiFENesin 1,200 mg Ta12 ER tablet Commonly known as:  Sandro & Sandro Take 1 Tab by mouth two (2) times a day. hydroCHLOROthiazide 50 mg tablet Commonly known as:  HYDRODIURIL  
TAKE ONE TABLET BY MOUTH EVERY DAY  
  
 * ipratropium 0.02 % nebulizer solution Commonly known as:  ATROVENT  
2.5 mL by Nebulization route as needed for Wheezing. Indications: MAINTENANCE THERAPY FOR ASTHMA * ipratropium 0.06 % nasal spray Commonly known as:  ATROVENT  
1 [place] by Both Nostrils route four (4) times daily. Indications: RHINORRHEA * metFORMIN 500 mg tablet Commonly known as:  GLUCOPHAGE Take 1 Tab by mouth two (2) times daily (with meals). * metFORMIN 500 mg tablet Commonly known as:  GLUCOPHAGE  
TAKE ONE TABLET BY MOUTH TWICE DAILY WITH  MEALS  
  
 montelukast 10 mg tablet Commonly known as:  SINGULAIR  
TAKE ONE TABLET BY MOUTH ONCE DAILY  
  
 quinapril 20 mg tablet Commonly known as:  ACCUPRIL Take 1 Tab by mouth nightly. Indications: hypertension * Notice: This list has 4 medication(s) that are the same as other medications prescribed for you. Read the directions carefully, and ask your doctor or other care provider to review them with you. Introducing [facility]! [employer] introduces Smartling patient portal. Now you can access parts of your medical record, email your doctor's office, and request medication refills online. 1. In your internet browser, go to [email or web address] 2. Click on the First Time User? Click Here link in the Sign In box. You will see the New Member Sign Up page. 3. Enter your [employer] Access Code exactly as it appears below. You will not need to use this code after youve completed the sign-up process. If you do not sign up before the expiration date, you must request a new code. · [employer] Access Code: [email or web address] Expires: 10/10/2017  4:20 PM 
 
4. Enter the last four digits of your Social Security Number (xxxx) and Date of Birth (mm/dd/yyyy) as indicated and click Submit. You will be taken to the next sign-up page. 5. Create a [employer] ID. This will be your [employer] login ID and cannot be changed, so think of one that is secure and easy to remember. 6. Create a [employer] password. You can change your password at any time. 7. Enter your Password Reset Question and Answer. This can be used at a later time if you forget your password. 8. Enter your e-mail address. You will receive e-mail notification when new information is available in [address] 9. Click Sign Up. You can now view and download portions of your medical record. 10. Click the Download Summary menu link to download a portable copy of your medical information. If you have questions, please visit the Frequently Asked Questions section of the [employer] website. Remember, [employer] is NOT to be used for urgent needs. For medical emergencies, dial 911. Now available from your iPhone and Android! Please provide this summary of care documentation to your next provider. Your primary care clinician is listed as Valdez Jama. If you have any questions after today's visit, please call [phone].

## 2017-08-04 DIAGNOSIS — J40 BRONCHITIS: ICD-10-CM

## 2017-08-07 RX ORDER — MONTELUKAST SODIUM 10 MG/1
TABLET ORAL
Qty: 30 TAB | Refills: 0 | Status: SHIPPED | OUTPATIENT
Start: 2017-08-07 | End: 2017-08-30 | Stop reason: SDUPTHER

## 2017-08-15 RX ORDER — BENZONATATE 200 MG/1
CAPSULE ORAL
Qty: 30 CAP | Refills: 0 | Status: SHIPPED | OUTPATIENT
Start: 2017-08-15 | End: 2017-08-30 | Stop reason: SDUPTHER

## 2017-09-18 RX ORDER — BENZONATATE 200 MG/1
CAPSULE ORAL
Qty: 30 CAP | Refills: 0 | Status: SHIPPED | OUTPATIENT
Start: 2017-09-18 | End: 2017-11-07 | Stop reason: ALTCHOICE

## 2017-10-27 ENCOUNTER — APPOINTMENT (OUTPATIENT)
Dept: GENERAL RADIOLOGY | Age: 57
DRG: 394 | End: 2017-10-27
Attending: SURGERY
Payer: COMMERCIAL

## 2017-10-27 ENCOUNTER — HOSPITAL ENCOUNTER (INPATIENT)
Age: 57
LOS: 4 days | Discharge: HOME | DRG: 394 | End: 2017-10-31
Attending: SURGERY | Admitting: SURGERY
Payer: COMMERCIAL

## 2017-10-27 PROBLEM — K56.609 SMALL BOWEL OBSTRUCTION (HCC): Status: ACTIVE | Noted: 2017-10-27

## 2017-10-27 LAB
GLUCOSE BLD STRIP.AUTO-MCNC: 119 MG/DL (ref 65–100)
SERVICE CMNT-IMP: ABNORMAL

## 2017-10-27 PROCEDURE — 90471 IMMUNIZATION ADMIN: CPT

## 2017-10-27 PROCEDURE — 90686 IIV4 VACC NO PRSV 0.5 ML IM: CPT | Performed by: SURGERY

## 2017-10-27 PROCEDURE — 65270000029 HC RM PRIVATE

## 2017-10-27 PROCEDURE — 82962 GLUCOSE BLOOD TEST: CPT

## 2017-10-27 PROCEDURE — 74011250637 HC RX REV CODE- 250/637: Performed by: SURGERY

## 2017-10-27 PROCEDURE — 77030009834 HC MSK O2 TRACH VYRM -A

## 2017-10-27 PROCEDURE — 0D9670Z DRAINAGE OF STOMACH WITH DRAINAGE DEVICE, VIA NATURAL OR ARTIFICIAL OPENING: ICD-10-PCS | Performed by: SURGERY

## 2017-10-27 PROCEDURE — 74000 XR ABD PORT  1 V: CPT

## 2017-10-27 PROCEDURE — 94640 AIRWAY INHALATION TREATMENT: CPT

## 2017-10-27 PROCEDURE — 74011250636 HC RX REV CODE- 250/636: Performed by: SURGERY

## 2017-10-27 PROCEDURE — 77030029684 HC NEB SM VOL KT MONA -A

## 2017-10-27 PROCEDURE — 74011000250 HC RX REV CODE- 250: Performed by: SURGERY

## 2017-10-27 RX ORDER — QUINAPRIL 10 MG/1
20 TABLET ORAL
Status: DISCONTINUED | OUTPATIENT
Start: 2017-10-27 | End: 2017-10-27 | Stop reason: CLARIF

## 2017-10-27 RX ORDER — SODIUM CHLORIDE, SODIUM LACTATE, POTASSIUM CHLORIDE, CALCIUM CHLORIDE 600; 310; 30; 20 MG/100ML; MG/100ML; MG/100ML; MG/100ML
75 INJECTION, SOLUTION INTRAVENOUS CONTINUOUS
Status: DISCONTINUED | OUTPATIENT
Start: 2017-10-27 | End: 2017-10-30

## 2017-10-27 RX ORDER — MONTELUKAST SODIUM 10 MG/1
10 TABLET ORAL
Status: DISCONTINUED | OUTPATIENT
Start: 2017-10-27 | End: 2017-10-31 | Stop reason: HOSPADM

## 2017-10-27 RX ORDER — LISINOPRIL 20 MG/1
40 TABLET ORAL
Status: DISCONTINUED | OUTPATIENT
Start: 2017-10-27 | End: 2017-10-31 | Stop reason: HOSPADM

## 2017-10-27 RX ORDER — SODIUM CHLORIDE 0.9 % (FLUSH) 0.9 %
5-10 SYRINGE (ML) INJECTION AS NEEDED
Status: DISCONTINUED | OUTPATIENT
Start: 2017-10-27 | End: 2017-10-31 | Stop reason: HOSPADM

## 2017-10-27 RX ORDER — FLUTICASONE PROPIONATE AND SALMETEROL 100; 50 UG/1; UG/1
1 POWDER RESPIRATORY (INHALATION) 2 TIMES DAILY
Status: DISCONTINUED | OUTPATIENT
Start: 2017-10-27 | End: 2017-10-27 | Stop reason: CLARIF

## 2017-10-27 RX ORDER — METFORMIN HYDROCHLORIDE 500 MG/1
500 TABLET ORAL 2 TIMES DAILY WITH MEALS
Status: DISCONTINUED | OUTPATIENT
Start: 2017-10-27 | End: 2017-10-31 | Stop reason: HOSPADM

## 2017-10-27 RX ORDER — ALBUTEROL SULFATE 90 UG/1
2 AEROSOL, METERED RESPIRATORY (INHALATION)
Status: DISCONTINUED | OUTPATIENT
Start: 2017-10-27 | End: 2017-10-27 | Stop reason: CLARIF

## 2017-10-27 RX ORDER — IPRATROPIUM BROMIDE 42 UG/1
1 SPRAY, METERED NASAL 4 TIMES DAILY
Status: DISCONTINUED | OUTPATIENT
Start: 2017-10-27 | End: 2017-10-31 | Stop reason: HOSPADM

## 2017-10-27 RX ORDER — MAGNESIUM SULFATE 100 %
4 CRYSTALS MISCELLANEOUS AS NEEDED
Status: DISCONTINUED | OUTPATIENT
Start: 2017-10-27 | End: 2017-10-31 | Stop reason: HOSPADM

## 2017-10-27 RX ORDER — DEXTROSE 50 % IN WATER (D50W) INTRAVENOUS SYRINGE
12.5-25 AS NEEDED
Status: DISCONTINUED | OUTPATIENT
Start: 2017-10-27 | End: 2017-10-31 | Stop reason: HOSPADM

## 2017-10-27 RX ORDER — FLUTICASONE PROPIONATE 50 MCG
2 SPRAY, SUSPENSION (ML) NASAL DAILY
Status: DISCONTINUED | OUTPATIENT
Start: 2017-10-28 | End: 2017-10-31 | Stop reason: HOSPADM

## 2017-10-27 RX ORDER — INSULIN LISPRO 100 [IU]/ML
INJECTION, SOLUTION INTRAVENOUS; SUBCUTANEOUS EVERY 6 HOURS
Status: DISCONTINUED | OUTPATIENT
Start: 2017-10-27 | End: 2017-10-30

## 2017-10-27 RX ORDER — DILTIAZEM HYDROCHLORIDE 240 MG/1
240 CAPSULE, COATED, EXTENDED RELEASE ORAL DAILY
Status: DISCONTINUED | OUTPATIENT
Start: 2017-10-28 | End: 2017-10-31 | Stop reason: HOSPADM

## 2017-10-27 RX ORDER — PROCHLORPERAZINE EDISYLATE 5 MG/ML
5 INJECTION INTRAMUSCULAR; INTRAVENOUS
Status: DISCONTINUED | OUTPATIENT
Start: 2017-10-27 | End: 2017-10-27 | Stop reason: SDUPTHER

## 2017-10-27 RX ORDER — MORPHINE SULFATE 10 MG/ML
4 INJECTION, SOLUTION INTRAMUSCULAR; INTRAVENOUS
Status: DISCONTINUED | OUTPATIENT
Start: 2017-10-27 | End: 2017-10-31 | Stop reason: HOSPADM

## 2017-10-27 RX ORDER — HYDROCHLOROTHIAZIDE 25 MG/1
50 TABLET ORAL DAILY
Status: DISCONTINUED | OUTPATIENT
Start: 2017-10-28 | End: 2017-10-31 | Stop reason: HOSPADM

## 2017-10-27 RX ORDER — SODIUM CHLORIDE 0.9 % (FLUSH) 0.9 %
5-10 SYRINGE (ML) INJECTION EVERY 8 HOURS
Status: DISCONTINUED | OUTPATIENT
Start: 2017-10-27 | End: 2017-10-31 | Stop reason: HOSPADM

## 2017-10-27 RX ORDER — IPRATROPIUM BROMIDE 0.5 MG/2.5ML
0.5 SOLUTION RESPIRATORY (INHALATION)
Status: DISCONTINUED | OUTPATIENT
Start: 2017-10-27 | End: 2017-10-31 | Stop reason: HOSPADM

## 2017-10-27 RX ORDER — ALBUTEROL SULFATE 0.83 MG/ML
2.5 SOLUTION RESPIRATORY (INHALATION)
Status: DISCONTINUED | OUTPATIENT
Start: 2017-10-27 | End: 2017-10-31 | Stop reason: HOSPADM

## 2017-10-27 RX ORDER — METFORMIN HYDROCHLORIDE 500 MG/1
1000 TABLET ORAL 2 TIMES DAILY
Status: DISCONTINUED | OUTPATIENT
Start: 2017-10-27 | End: 2017-10-27 | Stop reason: SDUPTHER

## 2017-10-27 RX ORDER — ACETAMINOPHEN 325 MG/1
650 TABLET ORAL
Status: DISCONTINUED | OUTPATIENT
Start: 2017-10-27 | End: 2017-10-31 | Stop reason: HOSPADM

## 2017-10-27 RX ORDER — DIPHENHYDRAMINE HCL 25 MG
25 CAPSULE ORAL
Status: DISCONTINUED | OUTPATIENT
Start: 2017-10-27 | End: 2017-10-31 | Stop reason: HOSPADM

## 2017-10-27 RX ORDER — BUDESONIDE 0.5 MG/2ML
500 INHALANT ORAL
Status: DISCONTINUED | OUTPATIENT
Start: 2017-10-27 | End: 2017-10-31 | Stop reason: HOSPADM

## 2017-10-27 RX ORDER — ARFORMOTEROL TARTRATE 15 UG/2ML
15 SOLUTION RESPIRATORY (INHALATION)
Status: DISCONTINUED | OUTPATIENT
Start: 2017-10-27 | End: 2017-10-31 | Stop reason: HOSPADM

## 2017-10-27 RX ORDER — ENOXAPARIN SODIUM 100 MG/ML
40 INJECTION SUBCUTANEOUS EVERY 24 HOURS
Status: DISCONTINUED | OUTPATIENT
Start: 2017-10-27 | End: 2017-10-31 | Stop reason: HOSPADM

## 2017-10-27 RX ADMIN — ENOXAPARIN SODIUM 40 MG: 40 INJECTION SUBCUTANEOUS at 17:41

## 2017-10-27 RX ADMIN — MONTELUKAST SODIUM 10 MG: 10 TABLET, FILM COATED ORAL at 22:06

## 2017-10-27 RX ADMIN — INFLUENZA VIRUS VACCINE 0.5 ML: 15; 15; 15; 15 SUSPENSION INTRAMUSCULAR at 17:44

## 2017-10-27 RX ADMIN — Medication 10 ML: at 17:43

## 2017-10-27 RX ADMIN — BUDESONIDE 500 MCG: 0.5 INHALANT RESPIRATORY (INHALATION) at 21:12

## 2017-10-27 RX ADMIN — ARFORMOTEROL TARTRATE 15 MCG: 15 SOLUTION RESPIRATORY (INHALATION) at 21:12

## 2017-10-27 RX ADMIN — IPRATROPIUM BROMIDE 1 SPRAY: 42 SPRAY NASAL at 22:06

## 2017-10-27 RX ADMIN — Medication 10 ML: at 22:06

## 2017-10-27 RX ADMIN — SODIUM CHLORIDE, SODIUM LACTATE, POTASSIUM CHLORIDE, AND CALCIUM CHLORIDE 125 ML/HR: 600; 310; 30; 20 INJECTION, SOLUTION INTRAVENOUS at 17:37

## 2017-10-27 RX ADMIN — LISINOPRIL 40 MG: 20 TABLET ORAL at 22:06

## 2017-10-27 NOTE — PROGRESS NOTES
Cassie Barrett RN and Maria Dolores Jordan RN performed a dual skin assessment on this patient No impairment noted.

## 2017-10-27 NOTE — PROGRESS NOTES
Problem: Falls - Risk of  Goal: *Absence of Falls  Document Iliana Fall Risk and appropriate interventions in the flowsheet.    Outcome: Progressing Towards Goal  Fall Risk Interventions:            Medication Interventions: Evaluate medications/consider consulting pharmacy, Patient to call before getting OOB, Teach patient to arise slowly    Elimination Interventions: Call light in reach, Elevated toilet seat, Toilet paper/wipes in reach

## 2017-10-27 NOTE — H&P
Surgery H and P. Camryn Jeffers is an 61 y.o. female who was transferred to [facility] for management of a small bowel obstruction. Ms. Francis Mann tells me that she began experiencing abdominal pain on 10/26/2017. She localizes pain to the left side. Associated nausea and vomitting. Denies hematemesis or coffee ground emesis. No fevers or chills. No chest pain or shortness of breath. Last BM was several days ago. Ms. Francis Mann reports no dysuria, hematuria. She has otherwise been in her usual state of health. CT Scan abdomen/pelvis without contrast - Small bowel obstruction related to approximately 7 cm left ventral abdominal wall hernia defect (presumed prior ostomy site) containing dilated loops of small bowel and nondilated loop of colonic splenic flexure. Ventral midline hernia defect of the upper abdomen measuring 9.4 cm in  transverse dimension contains nondilated transverse colon. Fat-containing ventral midline hernia defect just superior to the umbilicus measures 4.7 cm in transverse dimension. Mild/moderate pericardial effusion. Allergies - Review of patient's allergies indicates no known allergies. Meds - Reviewed. PMH -   Past Medical History:   Diagnosis Date    Anemia     Diabetes mellitus type II     Hypertension      PSH -   Past Surgical History:   Procedure Laterality Date    HX GI  2006     bowel surgery for infection s/p colostomy and then removal     HX PELVIC LAPAROSCOPY       Fam Hx -   Family History   Problem Relation Age of Onset    Cancer Mother      breast    Diabetes Mother     Hypertension Sister     Diabetes Sister      Soc Hx -   Social History   Substance Use Topics    Smoking status: Former Smoker    Smokeless tobacco: Never Used    Alcohol use No     Patient is an obese female in no acute distress.   Visit Vitals    BP (!) 171/92 (BP 1 Location: Left arm, BP Patient Position: At rest)    Pulse 90    Temp 97.7 °F (36.5 °C)    Resp 20    SpO2 97%     HEENT: Anicteric. Neck: Supple without Lymphadenopathy. Cor: RRR. Lungs: CTA Bilaterally. Abd: Soft. No distension. Fullness in midline and at LLQ colostomy site which is c/w an incarcerated hernia. No associated rebound or guarding. Ext: Edematous. Neuro: Grossly Non focal.   Skin: Well healed abdominal surgical scars. Labs -   Recent Results (from the past 24 hour(s))   URINALYSIS W/MICROSCOPIC    Collection Time: 10/27/17  8:45 AM   Result Value Ref Range    Color YELLOW/STRAW      Appearance CLEAR CLEAR      Specific gravity 1.030 1.003 - 1.030      pH (UA) 6.0 5.0 - 8.0      Protein 30 (A) NEG mg/dL    Glucose NEGATIVE  NEG mg/dL    Ketone NEGATIVE  NEG mg/dL    Blood NEGATIVE  NEG      Urobilinogen 0.2 0.2 - 1.0 EU/dL    Nitrites NEGATIVE  NEG      Leukocyte Esterase NEGATIVE  NEG      WBC 0-4 0 - 4 /hpf    RBC 0-5 0 - 5 /hpf    Epithelial cells MANY (A) FEW /lpf    Bacteria 2+ (A) NEG /hpf   BILIRUBIN, CONFIRM    Collection Time: 10/27/17  8:45 AM   Result Value Ref Range    Bilirubin UA, confirm NEGATIVE  NEG     CBC WITH AUTOMATED DIFF    Collection Time: 10/27/17  9:05 AM   Result Value Ref Range    WBC 11.0 3.6 - 11.0 K/uL    RBC 4.63 3.80 - 5.20 M/uL    HGB 10.5 (L) 11.5 - 16.0 g/dL    HCT 34.0 (L) 35.0 - 47.0 %    MCV 73.4 (L) 80.0 - 99.0 FL    MCH 22.7 (L) 26.0 - 34.0 PG    MCHC 30.9 30.0 - 36.5 g/dL    RDW 18.8 (H) 11.5 - 14.5 %    PLATELET 579 (H) 161 - 400 K/uL    NEUTROPHILS 83 (H) 32 - 75 %    LYMPHOCYTES 13 12 - 49 %    MONOCYTES 3 (L) 5 - 13 %    EOSINOPHILS 1 0 - 7 %    BASOPHILS 0 0 - 1 %    ABS. NEUTROPHILS 9.1 (H) 1.8 - 8.0 K/UL    ABS. LYMPHOCYTES 1.5 0.8 - 3.5 K/UL    ABS. MONOCYTES 0.3 0.0 - 1.0 K/UL    ABS. EOSINOPHILS 0.1 0.0 - 0.4 K/UL    ABS.  BASOPHILS 0.0 0.0 - 0.1 K/UL   METABOLIC PANEL, COMPREHENSIVE    Collection Time: 10/27/17  9:05 AM   Result Value Ref Range    Sodium 136 136 - 145 mmol/L    Potassium 4.1 3.5 - 5.1 mmol/L    Chloride 97 97 - 108 mmol/L CO2 29 21 - 32 mmol/L    Anion gap 10 5 - 15 mmol/L    Glucose 151 (H) 65 - 100 mg/dL    BUN 13 7.0 - 18.0 MG/DL    Creatinine 0.84 0.55 - 1.02 MG/DL    BUN/Creatinine ratio 15 12 - 20      GFR est AA >60 >60 ml/min/1.73m2    GFR est non-AA >60 >60 ml/min/1.73m2    Calcium 8.7 8.5 - 10.1 MG/DL    Bilirubin, total 0.4 0.2 - 1.0 MG/DL    ALT (SGPT) 22 14 - 63 U/L    AST (SGOT) 13 (L) 15 - 37 U/L    Alk. phosphatase 88 45 - 117 U/L    Protein, total 7.3 6.4 - 8.2 g/dL    Albumin 3.2 (L) 3.5 - 5.0 g/dL    Globulin 4.1 (H) 2.0 - 4.0 g/dL    A-G Ratio 0.8 (L) 1.1 - 2.2     MAGNESIUM    Collection Time: 10/27/17  9:05 AM   Result Value Ref Range    Magnesium 1.7 1.6 - 2.4 mg/dL   LACTIC ACID    Collection Time: 10/27/17  9:08 AM   Result Value Ref Range    Lactic acid 2.8 (HH) 0.4 - 2.0 MMOL/L     CT Scan - Reviewed. Imp: Pt. Is a 61 y.o. female with a small bowel obstruction due to an incarcerated ventral incisional hernia. Plan: 1. Admit   2. NPO except ice chips for now. 3. IV hydration - LR at 125 cc/hour. 4. NG decompression. 5. Labs in AM.   6. Pain medication and anti-emetics as needed. 7. DVT Prophylaxis - Lovenox. 8. Accuchecks and Sliding Scale insulin. 9. Restart anti-hypertensive medication.

## 2017-10-27 NOTE — IP AVS SNAPSHOT
[address] 
[phone] Patient: Cassandra Thompson MRN: [id number] :1960 You are allergic to the following No active allergies Immunizations Administered for This Admission Name Date Influenza Vaccine (Quad) PF 10/27/2017 Recent Documentation Height Weight BMI OB Status Smoking Status 1.651 m 141.3 kg 51.84 kg/m2 Postmenopausal Former Smoker Emergency Contacts  (Rel.) Home Phone Work Phone Mobile Phone Aman Bennett (Child) [phone] -- -- About your hospitalization You were admitted on:  2017 You last received care in the:  [facility] You were discharged on:  2017 Why you were hospitalized Your primary diagnosis was:  Not on File Your diagnoses also included:  Small Bowel Obstruction Providers Seen During Your Hospitalization Provider Specialty Primary office phone Eyad Beth MD General Surgery [phone] Your Primary Care Physician (PCP) Primary Care Physician Office Phone Office Fax Ardis Closs, [address] [facility] [phone] Follow-up Information Follow up With Details Comments Contact Info Zain Allan MD   [address] [place] [address] [phone] My Medications TAKE these medications as instructed Instructions Each Dose to Equal  
 Morning Noon Evening Bedtime  
 albuterol 90 mcg/actuation inhaler Commonly known as:  PROVENTIL HFA, VENTOLIN HFA, PROAIR HFA Your last dose was: Your next dose is: Take 2 Puffs by inhalation every four (4) hours as needed for Wheezing or Shortness of Breath. 2 Puff  
    
   
   
   
  
 benzonatate 200 mg capsule Commonly known as:  TESSALON Your last dose was: Your next dose is: TAKE ONE CAPSULE BY MOUTH THREE TIMES DAILY AS NEEDED FOR  COUGH  FOR  UP  TO  7  DAYS  
     
   
   
   
  
 dilTIAZem  mg ER capsule Commonly known as:  CARDIZEM CD Your last dose was: Your next dose is: Take 1 Cap by mouth daily. 240 mg  
    
   
   
   
  
 ferrous sulfate 325 mg (65 mg iron) tablet Your last dose was: Your next dose is: Take 1 Tab by mouth Daily (before breakfast). Indications: IRON DEFICIENCY ANEMIA  
 325 mg  
    
   
   
   
  
 fluticasone 50 mcg/actuation nasal spray Commonly known as:  Maurice Rodriguez Your last dose was: Your next dose is:    
   
   
 2 sprays per nostril daily  
     
   
   
   
  
 fluticasone-salmeterol 100-50 mcg/dose diskus inhaler Commonly known as:  ADVAIR Your last dose was: Your next dose is: Take 1 Puff by inhalation two (2) times a day. Indications: MAINTENANCE THERAPY FOR ASTHMA  
 1 Puff  
    
   
   
   
  
 guaiFENesin 1,200 mg Ta12 ER tablet Commonly known as:  Sandro & Sandro Your last dose was: Your next dose is: Take 1 Tab by mouth two (2) times a day. 1200 mg  
    
   
   
   
  
 hydroCHLOROthiazide 50 mg tablet Commonly known as:  HYDRODIURIL Your last dose was: Your next dose is: TAKE ONE TABLET BY MOUTH EVERY DAY  
     
   
   
   
  
 * ipratropium 0.02 % Soln Commonly known as:  ATROVENT Your last dose was: Your next dose is:    
   
   
 2.5 mL by Nebulization route as needed for Wheezing. Indications: MAINTENANCE THERAPY FOR ASTHMA  
 0.5 mg  
    
   
   
   
  
 * ipratropium 0.06 % nasal spray Commonly known as:  ATROVENT Your last dose was: Your next dose is:    
   
   
 1 Spray by Both Nostrils route four (4) times daily. Indications: RHINORRHEA  
 1 Spray  
    
   
   
   
  
 metFORMIN 500 mg tablet Commonly known as:  GLUCOPHAGE  
   
 Your last dose was: Your next dose is: Take 1 Tab by mouth two (2) times daily (with meals). 500 mg  
    
   
   
   
  
 montelukast 10 mg tablet Commonly known as:  SINGULAIR Your last dose was: Your next dose is: TAKE ONE TABLET BY MOUTH ONCE DAILY predniSONE 20 mg tablet Commonly known as:  Dilangela Dorado Your last dose was: Your next dose is:    
   
   
 5 po every day x 4 days then 4 on day 5, 3 on day 6, 2 on day 7, and 1 on day 8  
     
   
   
   
  
 quinapril 20 mg tablet Commonly known as:  ACCUPRIL Your last dose was: Your next dose is: Take 1 Tab by mouth nightly. Indications: hypertension 20 mg  
    
   
   
   
  
 * Notice: This list has 2 medication(s) that are the same as other medications prescribed for you. Read the directions carefully, and ask your doctor or other care provider to review them with you. Discharge Instructions Patient Discharge Instructions Salazar Dell / [id number] : 1960 Admitted 10/27/2017 Discharged: 10/31/2017 · It is important that you take the medication exactly as they are prescribed. · Keep your medication in the bottles provided by the pharmacist and keep a list of the medication names, dosages, and times to be taken in your wallet. · Do not take other medications without consulting your doctor. What to do at [facility] Recommended diet: As Directed. Recommended activity: No Restrictions. May Take Shower or [place] Roxo. If you experience any of the following symptoms Fevers, Chills, Nausea, Vomitting or Any Other Questions or Concerns Please Call -  [phone]. Follow-up with Dr. Arpita Valverde as needed. Information obtained by : 
I understand that if any problems occur once I am at home I am to contact my physician. I understand and acknowledge receipt of the instructions indicated above. Physician's or R.N.'s Signature                                                                  Date/Time Patient or Representative Signature                                                          Date/Time Discharge Orders None [employer] Announcement We are excited to announce that we are making your provider's discharge notes available to you in Vimty. You will see these notes when they are completed and signed by the physician that discharged you from your recent hospital stay. If you have any questions or concerns about any information you see in Vimty, please call the Health Information Department where you were seen or reach out to your Primary Care Provider for more information about your plan of care. Introducing [facility]! Abdias Marcytae introduces Vimty patient portal. Now you can access parts of your medical record, email your doctor's office, and request medication refills online. 1. In your internet browser, go to [email or web address] 2. Click on the First Time User? Click Here link in the Sign In box. You will see the New Member Sign Up page. 3. Enter your Vimty Access Code exactly as it appears below. You will not need to use this code after youve completed the sign-up process. If you do not sign up before the expiration date, you must request a new code. · Vimty Access Code: [email or web address] Expires: 1/29/2018  9:24 AM 
 
4. Enter the last four digits of your Social Security Number (xxxx) and Date of Birth (mm/dd/yyyy) as indicated and click Submit.  You will be taken to the next sign-up page. 5. Create a [employer] ID. This will be your [employer] login ID and cannot be changed, so think of one that is secure and easy to remember. 6. Create a [employer] password. You can change your password at any time. 7. Enter your Password Reset Question and Answer. This can be used at a later time if you forget your password. 8. Enter your e-mail address. You will receive e-mail notification when new information is available in [address] 9. Click Sign Up. You can now view and download portions of your medical record. 10. Click the Download Summary menu link to download a portable copy of your medical information. If you have questions, please visit the Frequently Asked Questions section of the [employer] website. Remember, [employer] is NOT to be used for urgent needs. For medical emergencies, dial 911. Now available from your iPhone and Android! General Information Please provide this summary of care documentation to your next provider. Patient Signature:  ____________________________________________________________ Date:  ____________________________________________________________  
  
Vero Suh Provider Signature:  ____________________________________________________________ Date:  ____________________________________________________________

## 2017-10-27 NOTE — PROGRESS NOTES
TRANSFER - IN REPORT:    Verbal report received from Alcides Jacobson RN(name) on Fred Farmer  being received from [facility]) for routine progression of care      Report consisted of patients Situation, Background, Assessment and   Recommendations(SBAR). Information from the following report(s) SBAR, Kardex and MAR was reviewed with the receiving nurse. Opportunity for questions and clarification was provided. Assessment completed upon patients arrival to unit and care assumed. 1430- Paged MD to notify patient was on her way, requested orders to be entered. 5- Paged MD again to notify as no orders have been placed yet. 80- Notified MD of KUB results, per MD, xray looks ok and would leave alone, no need to restart or reassess. Questioned Lactic Acid, per MD no concern for result as patient has no other s/s related to this, most likely because she was dehydrated. Does not want to reassess.

## 2017-10-28 ENCOUNTER — APPOINTMENT (OUTPATIENT)
Dept: GENERAL RADIOLOGY | Age: 57
DRG: 394 | End: 2017-10-28
Attending: SURGERY
Payer: COMMERCIAL

## 2017-10-28 LAB
ANION GAP SERPL CALC-SCNC: 12 MMOL/L (ref 5–15)
BASOPHILS # BLD: 0.1 K/UL (ref 0–0.1)
BASOPHILS NFR BLD: 1 % (ref 0–1)
BUN SERPL-MCNC: 13 MG/DL (ref 6–20)
BUN/CREAT SERPL: 19 (ref 12–20)
CALCIUM SERPL-MCNC: 8.4 MG/DL (ref 8.5–10.1)
CHLORIDE SERPL-SCNC: 99 MMOL/L (ref 97–108)
CO2 SERPL-SCNC: 25 MMOL/L (ref 21–32)
CREAT SERPL-MCNC: 0.7 MG/DL (ref 0.55–1.02)
EOSINOPHIL # BLD: 0.2 K/UL (ref 0–0.4)
EOSINOPHIL NFR BLD: 2 % (ref 0–7)
ERYTHROCYTE [DISTWIDTH] IN BLOOD BY AUTOMATED COUNT: 18.8 % (ref 11.5–14.5)
GLUCOSE BLD STRIP.AUTO-MCNC: 109 MG/DL (ref 65–100)
GLUCOSE BLD STRIP.AUTO-MCNC: 119 MG/DL (ref 65–100)
GLUCOSE BLD STRIP.AUTO-MCNC: 120 MG/DL (ref 65–100)
GLUCOSE BLD STRIP.AUTO-MCNC: 132 MG/DL (ref 65–100)
GLUCOSE SERPL-MCNC: 113 MG/DL (ref 65–100)
HCT VFR BLD AUTO: 34.1 % (ref 35–47)
HGB BLD-MCNC: 10.6 G/DL (ref 11.5–16)
LYMPHOCYTES # BLD: 1.8 K/UL (ref 0.8–3.5)
LYMPHOCYTES NFR BLD: 23 % (ref 12–49)
MAGNESIUM SERPL-MCNC: 1.9 MG/DL (ref 1.6–2.4)
MCH RBC QN AUTO: 23.2 PG (ref 26–34)
MCHC RBC AUTO-ENTMCNC: 31.1 G/DL (ref 30–36.5)
MCV RBC AUTO: 74.6 FL (ref 80–99)
MONOCYTES # BLD: 0.2 K/UL (ref 0–1)
MONOCYTES NFR BLD: 2 % (ref 5–13)
NEUTS SEG # BLD: 5.7 K/UL (ref 1.8–8)
NEUTS SEG NFR BLD: 72 % (ref 32–75)
NRBC # BLD: 0.06 K/UL (ref 0–0.01)
NRBC BLD-RTO: 0.7 PER 100 WBC
NRBC BLD-RTO: 1 PER 100 WBC
PHOSPHATE SERPL-MCNC: 3.9 MG/DL (ref 2.6–4.7)
PLATELET # BLD AUTO: 301 K/UL (ref 150–400)
POTASSIUM SERPL-SCNC: 3.5 MMOL/L (ref 3.5–5.1)
RBC # BLD AUTO: 4.57 M/UL (ref 3.8–5.2)
RBC MORPH BLD: ABNORMAL
SERVICE CMNT-IMP: ABNORMAL
SODIUM SERPL-SCNC: 136 MMOL/L (ref 136–145)
WBC # BLD AUTO: 8 K/UL (ref 3.6–11)
WBC NRBC COR # BLD: ABNORMAL 10*3/UL

## 2017-10-28 PROCEDURE — 36415 COLL VENOUS BLD VENIPUNCTURE: CPT | Performed by: SURGERY

## 2017-10-28 PROCEDURE — 65270000029 HC RM PRIVATE

## 2017-10-28 PROCEDURE — 74011250636 HC RX REV CODE- 250/636: Performed by: SURGERY

## 2017-10-28 PROCEDURE — 85025 COMPLETE CBC W/AUTO DIFF WBC: CPT | Performed by: SURGERY

## 2017-10-28 PROCEDURE — 82962 GLUCOSE BLOOD TEST: CPT

## 2017-10-28 PROCEDURE — 80048 BASIC METABOLIC PNL TOTAL CA: CPT | Performed by: SURGERY

## 2017-10-28 PROCEDURE — 74011250637 HC RX REV CODE- 250/637: Performed by: SURGERY

## 2017-10-28 PROCEDURE — 74000 XR ABD PORT  1 V: CPT

## 2017-10-28 PROCEDURE — 74011000250 HC RX REV CODE- 250: Performed by: SURGERY

## 2017-10-28 PROCEDURE — 94640 AIRWAY INHALATION TREATMENT: CPT

## 2017-10-28 PROCEDURE — 83735 ASSAY OF MAGNESIUM: CPT | Performed by: SURGERY

## 2017-10-28 PROCEDURE — 84100 ASSAY OF PHOSPHORUS: CPT | Performed by: SURGERY

## 2017-10-28 RX ADMIN — SODIUM CHLORIDE, SODIUM LACTATE, POTASSIUM CHLORIDE, AND CALCIUM CHLORIDE 125 ML/HR: 600; 310; 30; 20 INJECTION, SOLUTION INTRAVENOUS at 01:03

## 2017-10-28 RX ADMIN — ENOXAPARIN SODIUM 40 MG: 40 INJECTION SUBCUTANEOUS at 17:35

## 2017-10-28 RX ADMIN — BUDESONIDE 500 MCG: 0.5 INHALANT RESPIRATORY (INHALATION) at 08:00

## 2017-10-28 RX ADMIN — ARFORMOTEROL TARTRATE 15 MCG: 15 SOLUTION RESPIRATORY (INHALATION) at 22:16

## 2017-10-28 RX ADMIN — IPRATROPIUM BROMIDE 1 SPRAY: 42 SPRAY NASAL at 17:36

## 2017-10-28 RX ADMIN — ARFORMOTEROL TARTRATE 15 MCG: 15 SOLUTION RESPIRATORY (INHALATION) at 08:00

## 2017-10-28 RX ADMIN — BUDESONIDE 500 MCG: 0.5 INHALANT RESPIRATORY (INHALATION) at 22:16

## 2017-10-28 RX ADMIN — SODIUM CHLORIDE, SODIUM LACTATE, POTASSIUM CHLORIDE, AND CALCIUM CHLORIDE 125 ML/HR: 600; 310; 30; 20 INJECTION, SOLUTION INTRAVENOUS at 17:40

## 2017-10-28 RX ADMIN — ACETAMINOPHEN 650 MG: 325 TABLET ORAL at 09:58

## 2017-10-28 RX ADMIN — HYDROCHLOROTHIAZIDE 50 MG: 25 TABLET ORAL at 09:01

## 2017-10-28 RX ADMIN — Medication 10 ML: at 14:00

## 2017-10-28 RX ADMIN — DILTIAZEM HYDROCHLORIDE 240 MG: 240 CAPSULE, COATED, EXTENDED RELEASE ORAL at 09:01

## 2017-10-28 RX ADMIN — LISINOPRIL 40 MG: 20 TABLET ORAL at 22:14

## 2017-10-28 RX ADMIN — IPRATROPIUM BROMIDE 1 SPRAY: 42 SPRAY NASAL at 22:14

## 2017-10-28 RX ADMIN — MONTELUKAST SODIUM 10 MG: 10 TABLET, FILM COATED ORAL at 22:14

## 2017-10-28 NOTE — PROGRESS NOTES
Problem: Falls - Risk of  Goal: *Absence of Falls  Document Iliana Fall Risk and appropriate interventions in the flowsheet.    Outcome: Progressing Towards Goal  Fall Risk Interventions:            Medication Interventions: Patient to call before getting OOB, Teach patient to arise slowly    Elimination Interventions: Call light in reach, Patient to call for help with toileting needs, Toilet paper/wipes in reach

## 2017-10-28 NOTE — PROGRESS NOTES
Spiritual Care Partner Volunteer visited patient in room 625 on 10.28.17. Documented by: : Rev. Gayle Aguiar; [facility], to contact [phone] [address] Murcia call: [phone]

## 2017-10-28 NOTE — PROGRESS NOTES
Progress Note    Patient: Ezra Suggs MRN: [id number]  SSN: [id number]    YOB: 1960  Age: 61 y.o. Sex: female      Admit Date: 10/27/2017    * No surgery found *    Procedure:      Subjective:     Patient has no new complaints. Has been taking in a good amount of ice chips- Denies Flatus or BM    Objective:     Visit Vitals    /58 (BP 1 Location: Left arm, BP Patient Position: At rest)    Pulse 92    Temp 98.2 °F (36.8 °C)    Resp 18    SpO2 95%       Temp (24hrs), Av °F (36.7 °C), Min:97.6 °F (36.4 °C), Max:98.4 °F (36.9 °C)      Physical Exam:    GENERAL: alert, cooperative, no distress, appears stated age, EYE: negative, LUNG: clear to auscultation bilaterally, HEART: regular rate and rhythm, ABDOMEN: soft non- tender and non distended.+ BS NGT Clear, EXTREMITIES:  extremities normal, atraumatic, no cyanosis or edema    Data Review: images and reports reviewed  Single KUB demonstrates an NG tube folded back upon itself with the tip  projecting over the fundus.     IMPRESSION  IMPRESSION: NG tube tip projects over the fundus. Lab Review: All lab results for the last 24 hours reviewed.    Recent Results (from the past 24 hour(s))   GLUCOSE, POC    Collection Time: 10/27/17 11:38 PM   Result Value Ref Range    Glucose (POC) 119 (H) 65 - 100 mg/dL    Performed by [facility]    METABOLIC PANEL, BASIC    Collection Time: 10/28/17  4:00 AM   Result Value Ref Range    Sodium 136 136 - 145 mmol/L    Potassium 3.5 3.5 - 5.1 mmol/L    Chloride 99 97 - 108 mmol/L    CO2 25 21 - 32 mmol/L    Anion gap 12 5 - 15 mmol/L    Glucose 113 (H) 65 - 100 mg/dL    BUN 13 6 - 20 MG/DL    Creatinine 0.70 0.55 - 1.02 MG/DL    BUN/Creatinine ratio 19 12 - 20      GFR est AA >60 >60 ml/min/1.73m2    GFR est non-AA >60 >60 ml/min/1.73m2    Calcium 8.4 (L) 8.5 - 10.1 MG/DL   MAGNESIUM    Collection Time: 10/28/17  4:00 AM   Result Value Ref Range    Magnesium 1.9 1.6 - 2.4 mg/dL   PHOSPHORUS Collection Time: 10/28/17  4:00 AM   Result Value Ref Range    Phosphorus 3.9 2.6 - 4.7 MG/DL   CBC WITH AUTOMATED DIFF    Collection Time: 10/28/17  4:03 AM   Result Value Ref Range    WBC 8.0 3.6 - 11.0 K/uL    RBC 4.57 3.80 - 5.20 M/uL    HGB 10.6 (L) 11.5 - 16.0 g/dL    HCT 34.1 (L) 35.0 - 47.0 %    MCV 74.6 (L) 80.0 - 99.0 FL    MCH 23.2 (L) 26.0 - 34.0 PG    MCHC 31.1 30.0 - 36.5 g/dL    RDW 18.8 (H) 11.5 - 14.5 %    PLATELET 297 586 - 968 K/uL    NEUTROPHILS 72 32 - 75 %    LYMPHOCYTES 23 12 - 49 %    MONOCYTES 2 (L) 5 - 13 %    EOSINOPHILS 2 0 - 7 %    BASOPHILS 1 0 - 1 %    NRBC 1.0 (H) 0  WBC    ABS. NEUTROPHILS 5.7 1.8 - 8.0 K/UL    ABS. LYMPHOCYTES 1.8 0.8 - 3.5 K/UL    ABS. MONOCYTES 0.2 0.0 - 1.0 K/UL    ABS. EOSINOPHILS 0.2 0.0 - 0.4 K/UL    ABS. BASOPHILS 0.1 0.0 - 0.1 K/UL    RBC COMMENTS ANISOCYTOSIS  1+        RBC COMMENTS MICROCYTOSIS  1+        RBC COMMENTS POLYCHROMASIA  PRESENT        RBC COMMENTS HYPOCHROMIA  1+       NUCLEATED RBC    Collection Time: 10/28/17  4:03 AM   Result Value Ref Range    NRBC 0.7 (H) 0  WBC    ABSOLUTE NRBC 0.06 (H) 0.00 - 0.01 K/uL    WBC CORRECTED FOR NR ADJUSTED FOR NUCLEATED RBC'S     GLUCOSE, POC    Collection Time: 10/28/17  5:33 AM   Result Value Ref Range    Glucose (POC) 132 (H) 65 - 100 mg/dL    Performed by Perla Guzman          Assessment:     Hospital Problems  Date Reviewed: 10/27/2017          Codes Class Noted POA    Small bowel obstruction ICD-10-CM: Z79.822  ICD-9-CM: 560.9  10/27/2017 Unknown              Plan/Recommendations/Medical Decision Making:     Continue NGT for now. Will await some sign of bowel function. Pull ngt back 8 cm and repeat KUB.   F/u AXR in am.   OOB and ambulate      Signed By: Marjorie Allred MD     October 28, 2017

## 2017-10-28 NOTE — PROGRESS NOTES
Bedside and Verbal shift change report given to LIDYA Weber (oncoming nurse) by Maria Dolores Jordan RN (offgoing nurse). Report included the following information SBAR, Kardex, Intake/Output, MAR and Accordion.

## 2017-10-28 NOTE — PROGRESS NOTES
Bedside shift change report given to eliu sánchez (oncoming nurse) by Izabel Corral (offgoing nurse). Report included the following information SBAR, Kardex, Intake/Output, MAR and Recent Results.

## 2017-10-28 NOTE — PROGRESS NOTES
Bedside shift change report given to Feliberto Richards RN (oncoming nurse) by Wilbert Lynch RN (offgoing nurse). Report included the following information SBAR, Kardex and MAR.

## 2017-10-29 ENCOUNTER — APPOINTMENT (OUTPATIENT)
Dept: GENERAL RADIOLOGY | Age: 57
DRG: 394 | End: 2017-10-29
Attending: SURGERY
Payer: COMMERCIAL

## 2017-10-29 LAB
ANION GAP SERPL CALC-SCNC: 7 MMOL/L (ref 5–15)
BASOPHILS # BLD: 0 K/UL (ref 0–0.1)
BASOPHILS NFR BLD: 0 % (ref 0–1)
BUN SERPL-MCNC: 10 MG/DL (ref 6–20)
BUN/CREAT SERPL: 16 (ref 12–20)
CALCIUM SERPL-MCNC: 8.3 MG/DL (ref 8.5–10.1)
CHLORIDE SERPL-SCNC: 99 MMOL/L (ref 97–108)
CO2 SERPL-SCNC: 30 MMOL/L (ref 21–32)
CREAT SERPL-MCNC: 0.64 MG/DL (ref 0.55–1.02)
DIFFERENTIAL METHOD BLD: ABNORMAL
EOSINOPHIL # BLD: 0.1 K/UL (ref 0–0.4)
EOSINOPHIL NFR BLD: 1 % (ref 0–7)
ERYTHROCYTE [DISTWIDTH] IN BLOOD BY AUTOMATED COUNT: 18.5 % (ref 11.5–14.5)
GLUCOSE BLD STRIP.AUTO-MCNC: 106 MG/DL (ref 65–100)
GLUCOSE BLD STRIP.AUTO-MCNC: 114 MG/DL (ref 65–100)
GLUCOSE BLD STRIP.AUTO-MCNC: 129 MG/DL (ref 65–100)
GLUCOSE BLD STRIP.AUTO-MCNC: 98 MG/DL (ref 65–100)
GLUCOSE SERPL-MCNC: 119 MG/DL (ref 65–100)
HCT VFR BLD AUTO: 30.8 % (ref 35–47)
HGB BLD-MCNC: 9.4 G/DL (ref 11.5–16)
LYMPHOCYTES # BLD: 1.7 K/UL (ref 0.8–3.5)
LYMPHOCYTES NFR BLD: 21 % (ref 12–49)
MAGNESIUM SERPL-MCNC: 1.7 MG/DL (ref 1.6–2.4)
MCH RBC QN AUTO: 22.5 PG (ref 26–34)
MCHC RBC AUTO-ENTMCNC: 30.5 G/DL (ref 30–36.5)
MCV RBC AUTO: 73.7 FL (ref 80–99)
MONOCYTES # BLD: 0.3 K/UL (ref 0–1)
MONOCYTES NFR BLD: 4 % (ref 5–13)
NEUTS SEG # BLD: 6.1 K/UL (ref 1.8–8)
NEUTS SEG NFR BLD: 74 % (ref 32–75)
PHOSPHATE SERPL-MCNC: 3.9 MG/DL (ref 2.6–4.7)
PLATELET # BLD AUTO: 341 K/UL (ref 150–400)
POTASSIUM SERPL-SCNC: 3.4 MMOL/L (ref 3.5–5.1)
RBC # BLD AUTO: 4.18 M/UL (ref 3.8–5.2)
RBC MORPH BLD: ABNORMAL
SERVICE CMNT-IMP: ABNORMAL
SERVICE CMNT-IMP: NORMAL
SODIUM SERPL-SCNC: 136 MMOL/L (ref 136–145)
WBC # BLD AUTO: 8.2 K/UL (ref 3.6–11)

## 2017-10-29 PROCEDURE — 80048 BASIC METABOLIC PNL TOTAL CA: CPT | Performed by: SURGERY

## 2017-10-29 PROCEDURE — 65270000029 HC RM PRIVATE

## 2017-10-29 PROCEDURE — 84100 ASSAY OF PHOSPHORUS: CPT | Performed by: SURGERY

## 2017-10-29 PROCEDURE — 85025 COMPLETE CBC W/AUTO DIFF WBC: CPT | Performed by: SURGERY

## 2017-10-29 PROCEDURE — 94640 AIRWAY INHALATION TREATMENT: CPT

## 2017-10-29 PROCEDURE — 74020 XR ABD FLAT/ ERECT: CPT

## 2017-10-29 PROCEDURE — 74011250636 HC RX REV CODE- 250/636: Performed by: SURGERY

## 2017-10-29 PROCEDURE — 36415 COLL VENOUS BLD VENIPUNCTURE: CPT | Performed by: SURGERY

## 2017-10-29 PROCEDURE — 82962 GLUCOSE BLOOD TEST: CPT

## 2017-10-29 PROCEDURE — 74011000250 HC RX REV CODE- 250: Performed by: SURGERY

## 2017-10-29 PROCEDURE — 83735 ASSAY OF MAGNESIUM: CPT | Performed by: SURGERY

## 2017-10-29 PROCEDURE — 74011250637 HC RX REV CODE- 250/637: Performed by: SURGERY

## 2017-10-29 RX ORDER — BENZONATATE 100 MG/1
100 CAPSULE ORAL
Status: DISCONTINUED | OUTPATIENT
Start: 2017-10-29 | End: 2017-10-31 | Stop reason: HOSPADM

## 2017-10-29 RX ADMIN — SODIUM CHLORIDE, SODIUM LACTATE, POTASSIUM CHLORIDE, AND CALCIUM CHLORIDE 125 ML/HR: 600; 310; 30; 20 INJECTION, SOLUTION INTRAVENOUS at 01:49

## 2017-10-29 RX ADMIN — DILTIAZEM HYDROCHLORIDE 240 MG: 240 CAPSULE, COATED, EXTENDED RELEASE ORAL at 08:30

## 2017-10-29 RX ADMIN — BUDESONIDE 500 MCG: 0.5 INHALANT RESPIRATORY (INHALATION) at 21:25

## 2017-10-29 RX ADMIN — ARFORMOTEROL TARTRATE 15 MCG: 15 SOLUTION RESPIRATORY (INHALATION) at 21:25

## 2017-10-29 RX ADMIN — IPRATROPIUM BROMIDE 1 SPRAY: 42 SPRAY NASAL at 21:58

## 2017-10-29 RX ADMIN — IPRATROPIUM BROMIDE 1 SPRAY: 42 SPRAY NASAL at 09:00

## 2017-10-29 RX ADMIN — SODIUM CHLORIDE, SODIUM LACTATE, POTASSIUM CHLORIDE, AND CALCIUM CHLORIDE 125 ML/HR: 600; 310; 30; 20 INJECTION, SOLUTION INTRAVENOUS at 17:01

## 2017-10-29 RX ADMIN — HYDROCHLOROTHIAZIDE 50 MG: 25 TABLET ORAL at 08:30

## 2017-10-29 RX ADMIN — MONTELUKAST SODIUM 10 MG: 10 TABLET, FILM COATED ORAL at 21:57

## 2017-10-29 RX ADMIN — LISINOPRIL 40 MG: 20 TABLET ORAL at 21:57

## 2017-10-29 RX ADMIN — Medication 10 ML: at 14:40

## 2017-10-29 RX ADMIN — ARFORMOTEROL TARTRATE 15 MCG: 15 SOLUTION RESPIRATORY (INHALATION) at 09:35

## 2017-10-29 RX ADMIN — BUDESONIDE 500 MCG: 0.5 INHALANT RESPIRATORY (INHALATION) at 09:35

## 2017-10-29 RX ADMIN — ENOXAPARIN SODIUM 40 MG: 40 INJECTION SUBCUTANEOUS at 17:44

## 2017-10-29 RX ADMIN — IPRATROPIUM BROMIDE 1 SPRAY: 42 SPRAY NASAL at 17:43

## 2017-10-29 RX ADMIN — IPRATROPIUM BROMIDE 0.5 MG: 0.5 SOLUTION RESPIRATORY (INHALATION) at 21:25

## 2017-10-29 RX ADMIN — IPRATROPIUM BROMIDE 1 SPRAY: 42 SPRAY NASAL at 13:00

## 2017-10-29 NOTE — PROGRESS NOTES
Bedside shift change report given to Eliana Dooley RN (oncoming nurse) by Coral English RN (offgoing nurse). Report included the following information SBAR, Kardex and MAR.

## 2017-10-29 NOTE — PROGRESS NOTES
Bedside and Verbal shift change report given to LIDYA Weber (oncoming nurse) by LIDYA Calderon (offgoing nurse). Report included the following information SBAR, Kardex, Intake/Output, MAR and Accordion.

## 2017-10-29 NOTE — PROGRESS NOTES
Progress Note    Patient: Huy Kevin MRN: [id number]  SSN: [id number]    YOB: 1960  Age: 61 y.o. Sex: female      Admit Date: 10/27/2017    * No surgery found *    Procedure:      Subjective:     Patient has been passing gas. Objective:     Visit Vitals    /65 (BP 1 Location: Right arm, BP Patient Position: At rest)    Pulse 98    Temp 98.6 °F (37 °C)    Resp 18    SpO2 96%       Temp (24hrs), Av.8 °F (37.1 °C), Min:98.4 °F (36.9 °C), Max:99.5 °F (37.5 °C)  ngt 800 since admission but taking ice chips output clear    Physical Exam:    GENERAL: alert, cooperative, no distress, appears stated age, LUNG: clear to auscultation bilaterally, HEART: regular rate and rhythm, ABDOMEN: soft, non-tender. Bowel sounds normal. , EXTREMITIES:  extremities normal, atraumatic, no cyanosis or edema    Data Review: images and reports reviewed  axr- There is no demonstration of bowel obstruction  or free peroneal air. The lung bases appear normal. Moderate cardiac contour  enlargement is again shown. Moderate degenerative changes in the lumbar spine  are again shown. .  Lab Review: All lab results for the last 24 hours reviewed.   Recent Results (from the past 24 hour(s))   GLUCOSE, POC    Collection Time: 10/28/17  5:40 PM   Result Value Ref Range    Glucose (POC) 120 (H) 65 - 100 mg/dL    Performed by Jose Bose    GLUCOSE, POC    Collection Time: 10/28/17 11:40 PM   Result Value Ref Range    Glucose (POC) 119 (H) 65 - 100 mg/dL    Performed by Franny Hawkins    CBC WITH AUTOMATED DIFF    Collection Time: 10/29/17  2:11 AM   Result Value Ref Range    WBC 8.2 3.6 - 11.0 K/uL    RBC 4.18 3.80 - 5.20 M/uL    HGB 9.4 (L) 11.5 - 16.0 g/dL    HCT 30.8 (L) 35.0 - 47.0 %    MCV 73.7 (L) 80.0 - 99.0 FL    MCH 22.5 (L) 26.0 - 34.0 PG    MCHC 30.5 30.0 - 36.5 g/dL    RDW 18.5 (H) 11.5 - 14.5 %    PLATELET 838 205 - 228 K/uL    NEUTROPHILS 74 32 - 75 %    LYMPHOCYTES 21 12 - 49 %    MONOCYTES 4 (L) 5 - 13 %    EOSINOPHILS 1 0 - 7 %    BASOPHILS 0 0 - 1 %    ABS. NEUTROPHILS 6.1 1.8 - 8.0 K/UL    ABS. LYMPHOCYTES 1.7 0.8 - 3.5 K/UL    ABS. MONOCYTES 0.3 0.0 - 1.0 K/UL    ABS. EOSINOPHILS 0.1 0.0 - 0.4 K/UL    ABS. BASOPHILS 0.0 0.0 - 0.1 K/UL    DF SMEAR SCANNED      RBC COMMENTS POLYCHROMASIA  1+        RBC COMMENTS MICROCYTOSIS  1+        RBC COMMENTS ANISOCYTOSIS  1+        RBC COMMENTS HYPOCHROMIA  1+        RBC COMMENTS BASOPHILIC STIPPLING  PRESENT       METABOLIC PANEL, BASIC    Collection Time: 10/29/17  2:11 AM   Result Value Ref Range    Sodium 136 136 - 145 mmol/L    Potassium 3.4 (L) 3.5 - 5.1 mmol/L    Chloride 99 97 - 108 mmol/L    CO2 30 21 - 32 mmol/L    Anion gap 7 5 - 15 mmol/L    Glucose 119 (H) 65 - 100 mg/dL    BUN 10 6 - 20 MG/DL    Creatinine 0.64 0.55 - 1.02 MG/DL    BUN/Creatinine ratio 16 12 - 20      GFR est AA >60 >60 ml/min/1.73m2    GFR est non-AA >60 >60 ml/min/1.73m2    Calcium 8.3 (L) 8.5 - 10.1 MG/DL   MAGNESIUM    Collection Time: 10/29/17  2:11 AM   Result Value Ref Range    Magnesium 1.7 1.6 - 2.4 mg/dL   PHOSPHORUS    Collection Time: 10/29/17  2:11 AM   Result Value Ref Range    Phosphorus 3.9 2.6 - 4.7 MG/DL   GLUCOSE, POC    Collection Time: 10/29/17  5:42 AM   Result Value Ref Range    Glucose (POC) 129 (H) 65 - 100 mg/dL    Performed by [address] [place] [address], POC    Collection Time: 10/29/17 12:19 PM   Result Value Ref Range    Glucose (POC) 114 (H) 65 - 100 mg/dL    Performed by Jazzmine Boucher        Assessment:     Hospital Problems  Date Reviewed: 10/27/2017          Codes Class Noted POA    Small bowel obstruction ICD-10-CM: K82.666  ICD-9-CM: 560.9  10/27/2017 Unknown              Plan/Recommendations/Medical Decision Making:   Seems to be improving. she does have large output from the NGT- will clamp the ngt or now. Hopefully can remove tomorrow.    oob and ambulate      Signed By: Audie Parsons MD     October 29, 2017

## 2017-10-29 NOTE — PROGRESS NOTES
Bedside and Verbal shift change report given to LIDYA Weber (oncoming nurse) by Rudolph Renteria RN (offgoing nurse). Report included the following information SBAR, Kardex, Intake/Output, MAR and Accordion.

## 2017-10-30 LAB
ANION GAP SERPL CALC-SCNC: 11 MMOL/L (ref 5–15)
BASOPHILS # BLD: 0 K/UL (ref 0–0.1)
BASOPHILS NFR BLD: 0 % (ref 0–1)
BUN SERPL-MCNC: 7 MG/DL (ref 6–20)
BUN/CREAT SERPL: 13 (ref 12–20)
CALCIUM SERPL-MCNC: 8.5 MG/DL (ref 8.5–10.1)
CHLORIDE SERPL-SCNC: 99 MMOL/L (ref 97–108)
CO2 SERPL-SCNC: 29 MMOL/L (ref 21–32)
CREAT SERPL-MCNC: 0.55 MG/DL (ref 0.55–1.02)
DIFFERENTIAL METHOD BLD: ABNORMAL
EOSINOPHIL # BLD: 0 K/UL (ref 0–0.4)
EOSINOPHIL NFR BLD: 0 % (ref 0–7)
ERYTHROCYTE [DISTWIDTH] IN BLOOD BY AUTOMATED COUNT: 18.3 % (ref 11.5–14.5)
GLUCOSE BLD STRIP.AUTO-MCNC: 102 MG/DL (ref 65–100)
GLUCOSE BLD STRIP.AUTO-MCNC: 108 MG/DL (ref 65–100)
GLUCOSE BLD STRIP.AUTO-MCNC: 108 MG/DL (ref 65–100)
GLUCOSE BLD STRIP.AUTO-MCNC: 94 MG/DL (ref 65–100)
GLUCOSE BLD STRIP.AUTO-MCNC: 97 MG/DL (ref 65–100)
GLUCOSE BLD STRIP.AUTO-MCNC: NORMAL MG/DL (ref 65–100)
GLUCOSE SERPL-MCNC: 102 MG/DL (ref 65–100)
HCT VFR BLD AUTO: 32.7 % (ref 35–47)
HGB BLD-MCNC: 10 G/DL (ref 11.5–16)
LYMPHOCYTES # BLD: 1.8 K/UL (ref 0.8–3.5)
LYMPHOCYTES NFR BLD: 22 % (ref 12–49)
MAGNESIUM SERPL-MCNC: 1.7 MG/DL (ref 1.6–2.4)
MCH RBC QN AUTO: 22.5 PG (ref 26–34)
MCHC RBC AUTO-ENTMCNC: 30.6 G/DL (ref 30–36.5)
MCV RBC AUTO: 73.6 FL (ref 80–99)
MONOCYTES # BLD: 0.2 K/UL (ref 0–1)
MONOCYTES NFR BLD: 2 % (ref 5–13)
NEUTS BAND NFR BLD MANUAL: 1 % (ref 0–6)
NEUTS SEG # BLD: 6 K/UL (ref 1.8–8)
NEUTS SEG NFR BLD: 75 % (ref 32–75)
PHOSPHATE SERPL-MCNC: 4.3 MG/DL (ref 2.6–4.7)
PLATELET # BLD AUTO: 343 K/UL (ref 150–400)
PLATELET COMMENTS,PCOM: ABNORMAL
POTASSIUM SERPL-SCNC: 3.4 MMOL/L (ref 3.5–5.1)
RBC # BLD AUTO: 4.44 M/UL (ref 3.8–5.2)
RBC MORPH BLD: ABNORMAL
SERVICE CMNT-IMP: ABNORMAL
SERVICE CMNT-IMP: NORMAL
SERVICE CMNT-IMP: NORMAL
SODIUM SERPL-SCNC: 139 MMOL/L (ref 136–145)
WBC # BLD AUTO: 8 K/UL (ref 3.6–11)

## 2017-10-30 PROCEDURE — 94760 N-INVAS EAR/PLS OXIMETRY 1: CPT

## 2017-10-30 PROCEDURE — 74011000250 HC RX REV CODE- 250: Performed by: SURGERY

## 2017-10-30 PROCEDURE — 83735 ASSAY OF MAGNESIUM: CPT | Performed by: SURGERY

## 2017-10-30 PROCEDURE — 85025 COMPLETE CBC W/AUTO DIFF WBC: CPT | Performed by: SURGERY

## 2017-10-30 PROCEDURE — 80048 BASIC METABOLIC PNL TOTAL CA: CPT | Performed by: SURGERY

## 2017-10-30 PROCEDURE — 84100 ASSAY OF PHOSPHORUS: CPT | Performed by: SURGERY

## 2017-10-30 PROCEDURE — 36415 COLL VENOUS BLD VENIPUNCTURE: CPT | Performed by: SURGERY

## 2017-10-30 PROCEDURE — 94640 AIRWAY INHALATION TREATMENT: CPT

## 2017-10-30 PROCEDURE — 74011250637 HC RX REV CODE- 250/637: Performed by: SURGERY

## 2017-10-30 PROCEDURE — 65270000029 HC RM PRIVATE

## 2017-10-30 PROCEDURE — 74011250636 HC RX REV CODE- 250/636: Performed by: SURGERY

## 2017-10-30 RX ORDER — POTASSIUM CHLORIDE 750 MG/1
40 TABLET, FILM COATED, EXTENDED RELEASE ORAL DAILY
Status: DISCONTINUED | OUTPATIENT
Start: 2017-10-30 | End: 2017-10-31 | Stop reason: HOSPADM

## 2017-10-30 RX ORDER — INSULIN LISPRO 100 [IU]/ML
INJECTION, SOLUTION INTRAVENOUS; SUBCUTANEOUS
Status: DISCONTINUED | OUTPATIENT
Start: 2017-10-30 | End: 2017-10-31 | Stop reason: HOSPADM

## 2017-10-30 RX ADMIN — FLUTICASONE PROPIONATE 2 SPRAY: 50 SPRAY, METERED NASAL at 10:15

## 2017-10-30 RX ADMIN — METFORMIN HYDROCHLORIDE 500 MG: 500 TABLET, FILM COATED ORAL at 16:47

## 2017-10-30 RX ADMIN — HYDROCHLOROTHIAZIDE 50 MG: 25 TABLET ORAL at 09:11

## 2017-10-30 RX ADMIN — BENZONATATE 100 MG: 100 CAPSULE ORAL at 22:25

## 2017-10-30 RX ADMIN — Medication 10 ML: at 22:19

## 2017-10-30 RX ADMIN — BUDESONIDE 500 MCG: 0.5 INHALANT RESPIRATORY (INHALATION) at 20:15

## 2017-10-30 RX ADMIN — MONTELUKAST SODIUM 10 MG: 10 TABLET, FILM COATED ORAL at 22:19

## 2017-10-30 RX ADMIN — SODIUM CHLORIDE, SODIUM LACTATE, POTASSIUM CHLORIDE, AND CALCIUM CHLORIDE 75 ML/HR: 600; 310; 30; 20 INJECTION, SOLUTION INTRAVENOUS at 10:16

## 2017-10-30 RX ADMIN — DILTIAZEM HYDROCHLORIDE 240 MG: 240 CAPSULE, COATED, EXTENDED RELEASE ORAL at 09:11

## 2017-10-30 RX ADMIN — ARFORMOTEROL TARTRATE 15 MCG: 15 SOLUTION RESPIRATORY (INHALATION) at 08:55

## 2017-10-30 RX ADMIN — IPRATROPIUM BROMIDE 1 SPRAY: 42 SPRAY NASAL at 22:22

## 2017-10-30 RX ADMIN — IPRATROPIUM BROMIDE 1 SPRAY: 42 SPRAY NASAL at 16:49

## 2017-10-30 RX ADMIN — ENOXAPARIN SODIUM 40 MG: 40 INJECTION SUBCUTANEOUS at 16:47

## 2017-10-30 RX ADMIN — SODIUM CHLORIDE, SODIUM LACTATE, POTASSIUM CHLORIDE, AND CALCIUM CHLORIDE 125 ML/HR: 600; 310; 30; 20 INJECTION, SOLUTION INTRAVENOUS at 01:01

## 2017-10-30 RX ADMIN — LISINOPRIL 40 MG: 20 TABLET ORAL at 22:19

## 2017-10-30 RX ADMIN — BUDESONIDE 500 MCG: 0.5 INHALANT RESPIRATORY (INHALATION) at 08:55

## 2017-10-30 RX ADMIN — ARFORMOTEROL TARTRATE 15 MCG: 15 SOLUTION RESPIRATORY (INHALATION) at 20:15

## 2017-10-30 RX ADMIN — POTASSIUM CHLORIDE 40 MEQ: 750 TABLET, FILM COATED, EXTENDED RELEASE ORAL at 09:11

## 2017-10-30 RX ADMIN — IPRATROPIUM BROMIDE 1 SPRAY: 42 SPRAY NASAL at 13:24

## 2017-10-30 RX ADMIN — IPRATROPIUM BROMIDE 1 SPRAY: 42 SPRAY NASAL at 09:13

## 2017-10-30 RX ADMIN — Medication 10 ML: at 13:24

## 2017-10-30 NOTE — PROGRESS NOTES
No complaints this PM. Moving bowels. Tm 98.8 HR: 83 BP: 149/59 Resp Rate: 18 per minute 94% sat on room air. Abdomen - Soft. Non distended. Non tender. No associated rebound or guarding. Will try diabetic diet. Saline lock IVF. OOB, Ambulate. DVT Prophylaxis - Lovenox. Hopefully, home in AM 10/31/2017.

## 2017-10-30 NOTE — PROGRESS NOTES
NGT removed at bedside by Dr. Edgar Kennedy. Patient assisted to EOB to eat clear liquid diet. Patient instructed to notify staff with any complaints of nausea/vomiting while eating. IVF rate decreased to 75 mL/hr per MD order. Will continue to monitor patient.

## 2017-10-30 NOTE — PROGRESS NOTES
Problem: Discharge Planning  Goal: *Discharge to safe environment  Outcome: Progressing Towards Goal  Disposition Needs:  Patient to discharge home with family assistance. Discharge TBD pending recommendations. CM informed in rounds patient currently on a clear liquid diet. Patient will need to advance diet before discharge. There are no CM consults at this time. CM will continue to follow and assist with disposition needs as they arise. Mode of transport at time of discharge to be provided by patient's family.       DEBI Abbasi/PATRICIA  10:46 AM

## 2017-10-30 NOTE — PROGRESS NOTES
Bedside and Verbal shift change report given to Ofelia Hong RN (oncoming nurse) by Bhavna Rodriguez RN (offgoing nurse). Report included the following information SBAR, Kardex, MAR and Recent Results.

## 2017-10-30 NOTE — PROGRESS NOTES
Chart Reviewed: CM met with patient at bedside. Patient alert and oriented. CM introduced self and explained transition of care role. CM verified demographics, insurance coverage, and PCP. Patient's identified supports consist of Kandi Quach (Child: [phone]) and Sharla Rasheed (Sister: [phone]). Patient is a 61year old female with an admitting diagnosis of sbo. Patient's medical history consist of the following: Anemia, Hypertension and Diabetes Mellitus Type II. Patient is inpatient status, full code, and does not have any advance care planning on file. Current Living Situation: Patient resides in a 1 story home with 2 steps to enter with her spouse, son, daughter, and grandchild. Patient is independent with ADL's and IADL's. Patient does not utilize DME. Patient's source of income if provided by her spouse. Patient reports that her children receive disability. Patient reports no significant financial concerns at this time. Patient utilizes [facility] in [facility] for prescriptions. Discharge Planning: Patient to discharge home with family assistance. Disposition needs TBD pending recommendations. There are no current CM consults or needs at this time. CM will continue to follow and assist with disposition needs as they arise. Mode of transport at time of discharge to be provided by patient's family. Care Management Interventions  PCP Verified by CM:  Yes (Patient reports that she last saw PCP in August)  Palliative Care Criteria Met (RRAT>21 & CHF Dx)?: No  Mode of Transport at Discharge: BLS (Patient's family to assist with transport at discharge)  Transition of Care Consult (CM Consult):  (There are no current CM consults or needs )  Discharge Durable Medical Equipment: No  Physical Therapy Consult: No  Occupational Therapy Consult: No  Speech Therapy Consult: No  Current Support Network: Lives with Spouse, Own Home  Confirm Follow Up Transport: Family  Plan discussed with Pt/Family/Caregiver: Yes  Freedom of Choice Offered: Yes  Discharge Location  Discharge Placement: Home with family assistance (TBD pending recommendations)    DEBI Gonzales/PATRICIA  10:37 AM

## 2017-10-30 NOTE — PROGRESS NOTES
Problem: Falls - Risk of  Goal: *Absence of Falls  Document Iliana Fall Risk and appropriate interventions in the flowsheet.    Outcome: Progressing Towards Goal  Fall Risk Interventions:  Mobility Interventions: Communicate number of staff needed for ambulation/transfer, Patient to call before getting OOB         Medication Interventions: Patient to call before getting OOB, Teach patient to arise slowly    Elimination Interventions: Call light in reach, Patient to call for help with toileting needs

## 2017-10-30 NOTE — PROGRESS NOTES
No complaints this AM. Doing well with NG clamped. Passing flatus. Tm 98.8 HR: 100 BP: 144/84 Resp Rate: 17 per minute 98% sat on room air. Intake/Output Summary (Last 24 hours) at 10/30/17 0828  Last data filed at 10/29/17 1205   Gross per 24 hour   Intake              240 ml   Output                0 ml   Net              240 ml   Exam: Cor: RRR. Lungs: Bilateral breath sounds. Clear to auscultatin. Abd: Soft. Non distended. Non tender. No rebound or guarding. Labs:   Recent Results (from the past 12 hour(s))   GLUCOSE, POC    Collection Time: 10/29/17 11:29 PM   Result Value Ref Range    Glucose (POC) 106 (H) 65 - 100 mg/dL    Performed by Francisca Rogers    CBC WITH AUTOMATED DIFF    Collection Time: 10/30/17  4:54 AM   Result Value Ref Range    WBC 8.0 3.6 - 11.0 K/uL    RBC 4.44 3.80 - 5.20 M/uL    HGB 10.0 (L) 11.5 - 16.0 g/dL    HCT 32.7 (L) 35.0 - 47.0 %    MCV 73.6 (L) 80.0 - 99.0 FL    MCH 22.5 (L) 26.0 - 34.0 PG    MCHC 30.6 30.0 - 36.5 g/dL    RDW 18.3 (H) 11.5 - 14.5 %    PLATELET 917 982 - 158 K/uL    NEUTROPHILS 75 32 - 75 %    BAND NEUTROPHILS 1 0 - 6 %    LYMPHOCYTES 22 12 - 49 %    MONOCYTES 2 (L) 5 - 13 %    EOSINOPHILS 0 0 - 7 %    BASOPHILS 0 0 - 1 %    ABS. NEUTROPHILS 6.0 1.8 - 8.0 K/UL    ABS. LYMPHOCYTES 1.8 0.8 - 3.5 K/UL    ABS. MONOCYTES 0.2 0.0 - 1.0 K/UL    ABS. EOSINOPHILS 0.0 0.0 - 0.4 K/UL    ABS.  BASOPHILS 0.0 0.0 - 0.1 K/UL    DF MANUAL      PLATELET COMMENTS LARGE PLATELETS      RBC COMMENTS MICROCYTOSIS  1+        RBC COMMENTS ANISOCYTOSIS  1+        RBC COMMENTS POLYCHROMASIA  1+       METABOLIC PANEL, BASIC    Collection Time: 10/30/17  4:54 AM   Result Value Ref Range    Sodium 139 136 - 145 mmol/L    Potassium 3.4 (L) 3.5 - 5.1 mmol/L    Chloride 99 97 - 108 mmol/L    CO2 29 21 - 32 mmol/L    Anion gap 11 5 - 15 mmol/L    Glucose 102 (H) 65 - 100 mg/dL    BUN 7 6 - 20 MG/DL    Creatinine 0.55 0.55 - 1.02 MG/DL    BUN/Creatinine ratio 13 12 - 20      GFR est AA >60 >60 ml/min/1.73m2    GFR est non-AA >60 >60 ml/min/1.73m2    Calcium 8.5 8.5 - 10.1 MG/DL   MAGNESIUM    Collection Time: 10/30/17  4:54 AM   Result Value Ref Range    Magnesium 1.7 1.6 - 2.4 mg/dL   PHOSPHORUS    Collection Time: 10/30/17  4:54 AM   Result Value Ref Range    Phosphorus 4.3 2.6 - 4.7 MG/DL   GLUCOSE, POC    Collection Time: 10/30/17  5:28 AM   Result Value Ref Range    Glucose (POC) 102 (H) 65 - 100 mg/dL    Performed by [facility] ([facility])    Will remove NG tube. Start clear liquid diet and advance as tolerated. Decrease IVF to 75 cc/hour. Replace K 3.4. Labs in AM.   OOB, Ambulate. DVT Prophylaxis - Lovenox. Pain medication/anti-emetics as needed.

## 2017-10-31 VITALS
RESPIRATION RATE: 16 BRPM | BODY MASS INDEX: 48.82 KG/M2 | HEIGHT: 65 IN | OXYGEN SATURATION: 97 % | SYSTOLIC BLOOD PRESSURE: 151 MMHG | TEMPERATURE: 98.3 F | HEART RATE: 90 BPM | DIASTOLIC BLOOD PRESSURE: 81 MMHG | WEIGHT: 293 LBS

## 2017-10-31 LAB
ANION GAP SERPL CALC-SCNC: 12 MMOL/L (ref 5–15)
BUN SERPL-MCNC: 7 MG/DL (ref 6–20)
BUN/CREAT SERPL: 13 (ref 12–20)
CALCIUM SERPL-MCNC: 8.7 MG/DL (ref 8.5–10.1)
CHLORIDE SERPL-SCNC: 99 MMOL/L (ref 97–108)
CO2 SERPL-SCNC: 26 MMOL/L (ref 21–32)
CREAT SERPL-MCNC: 0.55 MG/DL (ref 0.55–1.02)
GLUCOSE BLD STRIP.AUTO-MCNC: 114 MG/DL (ref 65–100)
GLUCOSE BLD STRIP.AUTO-MCNC: 139 MG/DL (ref 65–100)
GLUCOSE SERPL-MCNC: 104 MG/DL (ref 65–100)
POTASSIUM SERPL-SCNC: 3.9 MMOL/L (ref 3.5–5.1)
SERVICE CMNT-IMP: ABNORMAL
SERVICE CMNT-IMP: ABNORMAL
SODIUM SERPL-SCNC: 137 MMOL/L (ref 136–145)

## 2017-10-31 PROCEDURE — 80048 BASIC METABOLIC PNL TOTAL CA: CPT | Performed by: SURGERY

## 2017-10-31 PROCEDURE — 74011250637 HC RX REV CODE- 250/637: Performed by: SURGERY

## 2017-10-31 PROCEDURE — 94640 AIRWAY INHALATION TREATMENT: CPT

## 2017-10-31 PROCEDURE — 82962 GLUCOSE BLOOD TEST: CPT

## 2017-10-31 PROCEDURE — 74011000250 HC RX REV CODE- 250: Performed by: SURGERY

## 2017-10-31 PROCEDURE — 36415 COLL VENOUS BLD VENIPUNCTURE: CPT | Performed by: SURGERY

## 2017-10-31 RX ADMIN — HYDROCHLOROTHIAZIDE 50 MG: 25 TABLET ORAL at 08:36

## 2017-10-31 RX ADMIN — METFORMIN HYDROCHLORIDE 500 MG: 500 TABLET, FILM COATED ORAL at 07:25

## 2017-10-31 RX ADMIN — FLUTICASONE PROPIONATE 2 SPRAY: 50 SPRAY, METERED NASAL at 08:38

## 2017-10-31 RX ADMIN — ARFORMOTEROL TARTRATE 15 MCG: 15 SOLUTION RESPIRATORY (INHALATION) at 07:50

## 2017-10-31 RX ADMIN — Medication 10 ML: at 06:55

## 2017-10-31 RX ADMIN — POTASSIUM CHLORIDE 40 MEQ: 750 TABLET, FILM COATED, EXTENDED RELEASE ORAL at 08:37

## 2017-10-31 RX ADMIN — IPRATROPIUM BROMIDE 1 SPRAY: 42 SPRAY NASAL at 08:38

## 2017-10-31 RX ADMIN — DILTIAZEM HYDROCHLORIDE 240 MG: 240 CAPSULE, COATED, EXTENDED RELEASE ORAL at 08:36

## 2017-10-31 RX ADMIN — BUDESONIDE 500 MCG: 0.5 INHALANT RESPIRATORY (INHALATION) at 07:50

## 2017-10-31 NOTE — DISCHARGE SUMMARY
Physician Discharge Summary     Patient ID:  Madeleine Cannon  [id number]  36 y.o.  1960    Admit Date: 10/27/2017    Discharge Date: 10/31/2017    * Admission Diagnoses: sbo;Small bowel obstruction    * Discharge Diagnoses:    Hospital Problems as of 10/31/2017  Date Reviewed: 10/27/2017          Codes Class Noted - Resolved POA    Small bowel obstruction ICD-10-CM: I89.952  ICD-9-CM: 560.9  10/27/2017 - Present Unknown               Admission Condition: Fair    * Discharge Condition: improved    * Procedures: * No surgery found [facility] Course:   Normal hospital course for this procedure. Consults: None    Significant Diagnostic Studies: None. * Disposition: Home    Discharge Medications:   Current Discharge Medication List      CONTINUE these medications which have NOT CHANGED    Details   benzonatate (TESSALON) 200 mg capsule TAKE ONE CAPSULE BY MOUTH THREE TIMES DAILY AS NEEDED FOR  COUGH  FOR  UP  TO  7  DAYS  Qty: 30 Cap, Refills: 0      montelukast (SINGULAIR) 10 mg tablet TAKE ONE TABLET BY MOUTH ONCE DAILY  Qty: 30 Tab, Refills: 5    Associated Diagnoses: Bronchitis      dilTIAZem CD (CARDIZEM CD) 240 mg ER capsule Take 1 Cap by mouth daily. Qty: 90 Cap, Refills: 1    Comments: Please schedule an appt for further refills  Associated Diagnoses: Essential hypertension      hydroCHLOROthiazide (HYDRODIURIL) 50 mg tablet TAKE ONE TABLET BY MOUTH EVERY DAY  Qty: 90 Tab, Refills: 1    Associated Diagnoses: Essential hypertension      quinapril (ACCUPRIL) 20 mg tablet Take 1 Tab by mouth nightly. Indications: hypertension  Qty: 30 Tab, Refills: 6      fluticasone (FLONASE) 50 mcg/actuation nasal spray 2 sprays per nostril daily  Qty: 1 Bottle, Refills: 5    Associated Diagnoses: Bronchitis      metFORMIN (GLUCOPHAGE) 500 mg tablet Take 1 Tab by mouth two (2) times daily (with meals).   Qty: 60 Tab, Refills: 3      ferrous sulfate 325 mg (65 mg iron) tablet Take 1 Tab by mouth Daily (before breakfast). Indications: IRON DEFICIENCY ANEMIA  Qty: 90 Tab, Refills: 1    Associated Diagnoses: Iron deficiency anemia, unspecified iron deficiency anemia type      albuterol (PROVENTIL HFA, VENTOLIN HFA, PROAIR HFA) 90 mcg/actuation inhaler Take 2 Puffs by inhalation every four (4) hours as needed for Wheezing or Shortness of Breath. 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 Qty: 15 Tab, Refills: 0      ipratropium (ATROVENT) 0.06 % nasal spray 1 [place] by Both Nostrils route four (4) times daily. Indications: RHINORRHEA  Qty: 15 mL, Refills: 0    Associated Diagnoses: Acute nasopharyngitis      ipratropium (ATROVENT) 0.02 % nebulizer solution 2.5 mL by Nebulization route as needed for Wheezing. Indications: MAINTENANCE THERAPY FOR ASTHMA  Qty: 2.5 mL, Refills: 0      fluticasone-salmeterol (ADVAIR) 100-50 mcg/dose diskus inhaler Take 1 Puff by inhalation two (2) times a day. Indications: MAINTENANCE THERAPY FOR ASTHMA  Qty: 1 Inhaler, Refills: 0             * Follow-up Care/Patient Instructions: Activity: Activity as tolerated  Diet: Diabetic Diet  Wound Care: None needed    Follow-up Information     Follow up With Details Comments Contact Info    Socorro Rubio MD   [facility], [facility]  47.7  [phone]          Follow-up tests/labs None.     Signed:  Hoang Carr MD  10/31/2017  9:07 AM

## 2017-10-31 NOTE — PROGRESS NOTES
Bedside shift change report given to Leonie Lucia (oncoming nurse) by Ofelia Hong (offgoing nurse). Report included the following information SBAR and Kardex.

## 2017-10-31 NOTE — PROGRESS NOTES
No complaints this AM. Tolerating diet. Moving bowels. Tm 98.6 HR: 90 BP: 151/81 Resp Rate: 16 per minute 97% sat on room air. Intake/Output Summary (Last 24 hours) at 10/31/17 0903  Last data filed at 10/30/17 1324   Gross per 24 hour   Intake          4435.48 ml   Output                0 ml   Net          4435.48 ml   Exam: Cor: RRR. Lungs: Bilateral breath sounds. Clear to auscultation. Abd: Soft. Non distended. Non tender. No rebound or guarding. Labs:   Recent Results (from the past 12 hour(s))   GLUCOSE, POC    Collection Time: 10/30/17 10:06 PM   Result Value Ref Range    Glucose (POC) 97 65 - 100 mg/dL    Performed by Bernardo [address], BASIC    Collection Time: 10/31/17  3:07 AM   Result Value Ref Range    Sodium 137 136 - 145 mmol/L    Potassium 3.9 3.5 - 5.1 mmol/L    Chloride 99 97 - 108 mmol/L    CO2 26 21 - 32 mmol/L    Anion gap 12 5 - 15 mmol/L    Glucose 104 (H) 65 - 100 mg/dL    BUN 7 6 - 20 MG/DL    Creatinine 0.55 0.55 - 1.02 MG/DL    BUN/Creatinine ratio 13 12 - 20      GFR est AA >60 >60 ml/min/1.73m2    GFR est non-AA >60 >60 ml/min/1.73m2    Calcium 8.7 8.5 - 10.1 MG/DL   GLUCOSE, POC    Collection Time: 10/31/17  6:54 AM   Result Value Ref Range    Glucose (POC) 139 (H) 65 - 100 mg/dL    Performed by Berry Flores    SBO seems to have resolved. Will discharge to home. Instructions on chart. Will see as needed.

## 2017-10-31 NOTE — PROGRESS NOTES
Bedside shift change report given to [facility], RN (oncoming nurse) by Domenico Herbert RN (offgoing nurse). Report included the following information SBAR and Kardex.

## 2017-10-31 NOTE — DISCHARGE INSTRUCTIONS
Patient Discharge Instructions    Alexandra Phillip / [id number] : 1960    Admitted 10/27/2017 Discharged: 10/31/2017       · It is important that you take the medication exactly as they are prescribed. · Keep your medication in the bottles provided by the pharmacist and keep a list of the medication names, dosages, and times to be taken in your wallet. · Do not take other medications without consulting your doctor. What to do at Home    Recommended diet: As Directed. Recommended activity: No Restrictions. May Take Shower or [place] Roxo. If you experience any of the following symptoms Fevers, Chills, Nausea, Vomitting or Any Other Questions or Concerns Please Call -  [phone]. Follow-up with Dr. Martha Green as needed. Information obtained by :  I understand that if any problems occur once I am at home I am to contact my physician. I understand and acknowledge receipt of the instructions indicated above.                                                                                                                                            Physician's or R.N.'s Signature                                                                  Date/Time                                                                                                                                              Patient or Representative Signature                                                          Date/Time

## 2017-11-07 ENCOUNTER — OFFICE VISIT (OUTPATIENT)
Dept: FAMILY MEDICINE CLINIC | Age: 57
End: 2017-11-07

## 2017-11-07 VITALS
WEIGHT: 293 LBS | OXYGEN SATURATION: 96 % | DIASTOLIC BLOOD PRESSURE: 63 MMHG | TEMPERATURE: 97.5 F | HEART RATE: 95 BPM | SYSTOLIC BLOOD PRESSURE: 177 MMHG | BODY MASS INDEX: 48.82 KG/M2 | HEIGHT: 65 IN | RESPIRATION RATE: 19 BRPM

## 2017-11-07 DIAGNOSIS — E87.6 HYPOKALEMIA: ICD-10-CM

## 2017-11-07 DIAGNOSIS — E66.01 MORBID OBESITY WITH BMI OF 50.0-59.9, ADULT (HCC): ICD-10-CM

## 2017-11-07 DIAGNOSIS — I10 ESSENTIAL HYPERTENSION: ICD-10-CM

## 2017-11-07 DIAGNOSIS — D64.9 ANEMIA, UNSPECIFIED TYPE: ICD-10-CM

## 2017-11-07 DIAGNOSIS — E11.9 TYPE 2 DIABETES MELLITUS WITHOUT COMPLICATION, WITHOUT LONG-TERM CURRENT USE OF INSULIN (HCC): Primary | ICD-10-CM

## 2017-11-07 DIAGNOSIS — K56.609 SMALL BOWEL OBSTRUCTION (HCC): ICD-10-CM

## 2017-11-07 PROBLEM — J44.1 EXACERBATION OF COPD ASSOCIATED WITH VOLCANIC SMOG EXPOSURE (HCC): Status: RESOLVED | Noted: 2017-08-02 | Resolved: 2017-11-07

## 2017-11-07 PROBLEM — Z77.110 EXACERBATION OF COPD ASSOCIATED WITH VOLCANIC SMOG EXPOSURE (HCC): Status: RESOLVED | Noted: 2017-08-02 | Resolved: 2017-11-07

## 2017-11-07 NOTE — MR AVS SNAPSHOT
Visit Information Date & Time Provider Department Dept. Phone Encounter #  
 11/7/2017 10:20 AM Isabella Hollins MD [facility] [phone] [id number] Upcoming Health Maintenance Date Due Hepatitis C Screening 1960 EYE EXAM RETINAL OR DILATED Q1 12/13/1969 Pneumococcal 19-64 Medium Risk (1 of 1 - PPSV23) 12/13/1978 DTaP/Tdap/Td series (1 - Tdap) 12/13/1980 PAP AKA CERVICAL CYTOLOGY 12/13/1980 HEMOGLOBIN A1C Q6M 2/5/2017 FOOT EXAM Q1 8/5/2017 MICROALBUMIN Q1 8/5/2017 LIPID PANEL Q1 8/5/2017 FOBT Q 1 YEAR AGE 50-75 8/22/2017 BREAST CANCER SCRN MAMMOGRAM 1/31/2019 Allergies as of 11/7/2017  Review Complete On: 11/7/2017 By: Isabella Hollins MD  
 No Known Allergies Current Immunizations  Never Reviewed Name Date Influenza Vaccine (Quad) PF 10/27/2017  5:44 PM  
  
 Not reviewed this visit You Were Diagnosed With   
  
 Codes Comments Type 2 diabetes mellitus without complication, without long-term current use of insulin (HCC)    -  Primary ICD-10-CM: E11.9 ICD-9-CM: 250.00 Morbid obesity with BMI of 50.0-59.9, adult (HCC)     ICD-10-CM: E66.01, Z68.43 
ICD-9-CM: 278.01, V85.43 Small bowel obstruction     ICD-10-CM: D36.285 ICD-9-CM: 560.9 Hypokalemia     ICD-10-CM: E87.6 ICD-9-CM: 276.8 Anemia, unspecified type     ICD-10-CM: D64.9 ICD-9-CM: 285.9 Essential hypertension     ICD-10-CM: I10 
ICD-9-CM: 401.9 Vitals OB Status Smoking Status Postmenopausal Former Smoker Preferred Pharmacy Pharmacy Name Phone [facility] [address], [place] - [address] [phone] Your Updated Medication List  
  
   
This list is accurate as of: 11/7/17 10:54 AM.  Always use your most recent med list.  
  
  
  
  
 albuterol 90 mcg/actuation inhaler Commonly known as:  PROVENTIL HFA, VENTOLIN HFA, PROAIR HFA  
 Take 2 Puffs by inhalation every four (4) hours as needed for Wheezing or Shortness of Breath. benzonatate 200 mg capsule Commonly known as:  TESSALON  
TAKE ONE CAPSULE BY MOUTH THREE TIMES DAILY AS NEEDED FOR  COUGH  FOR  UP  TO  7  DAYS  
  
 dilTIAZem  mg ER capsule Commonly known as:  CARDIZEM CD Take 1 Cap by mouth daily. ferrous sulfate 325 mg (65 mg iron) tablet Take 1 Tab by mouth Daily (before breakfast). Indications: IRON DEFICIENCY ANEMIA  
  
 fluticasone 50 mcg/actuation nasal spray Commonly known as:  FLONASE  
2 sprays per nostril daily  
  
 fluticasone-salmeterol 100-50 mcg/dose diskus inhaler Commonly known as:  ADVAIR Take 1 Puff by inhalation two (2) times a day. Indications: MAINTENANCE THERAPY FOR ASTHMA  
  
 guaiFENesin 1,200 mg Ta12 ER tablet Commonly known as:  Sandro & Sandro Take 1 Tab by mouth two (2) times a day. hydroCHLOROthiazide 50 mg tablet Commonly known as:  HYDRODIURIL  
TAKE ONE TABLET BY MOUTH EVERY DAY  
  
 * ipratropium 0.02 % Soln Commonly known as:  ATROVENT  
2.5 mL by Nebulization route as needed for Wheezing. Indications: MAINTENANCE THERAPY FOR ASTHMA * ipratropium 0.06 % nasal spray Commonly known as:  ATROVENT  
1 [place] by Both Nostrils route four (4) times daily. Indications: RHINORRHEA  
  
 metFORMIN 500 mg tablet Commonly known as:  GLUCOPHAGE Take 1 Tab by mouth two (2) times daily (with meals). montelukast 10 mg tablet Commonly known as:  SINGULAIR  
TAKE ONE TABLET BY MOUTH ONCE DAILY predniSONE 20 mg tablet Commonly known as:  DELTASONE  
5 po every day x 4 days then 4 on day 5, 3 on day 6, 2 on day 7, and 1 on day 8  
  
 quinapril 20 mg tablet Commonly known as:  ACCUPRIL Take 1 Tab by mouth nightly. Indications: hypertension * Notice: This list has 2 medication(s) that are the same as other medications prescribed for you.  Read the directions carefully, and ask your doctor or other care provider to review them with you. We Performed the Following COLLECTION VENOUS BLOOD,VENIPUNCTURE [id number] CPT(R)] HEMOGLOBIN A1C WITH EAG [91306 CPT(R)] IRON PROFILE [id number] CPT(R)] LIPID PANEL [92158 CPT(R)] METABOLIC PANEL, COMPREHENSIVE [31545 CPT(R)] MICROALBUMIN, UR, RAND W/ MICROALBUMIN/CREA RATIO [id number] CPT(R)] To-Do List   
 11/07/2017 Lab:  METABOLIC PANEL, COMPREHENSIVE Introducing [facility]! [facility] introduces [employer] patient portal. Now you can access parts of your medical record, email your doctor's office, and request medication refills online. 1. In your internet browser, go to [email or web address] 2. Click on the First Time User? Click Here link in the Sign In box. You will see the New Member Sign Up page. 3. Enter your [employer] Access Code exactly as it appears below. You will not need to use this code after youve completed the sign-up process. If you do not sign up before the expiration date, you must request a new code. · [employer] Access Code: [email or web address] Expires: 1/29/2018  8:24 AM 
 
4. Enter the last four digits of your Social Security Number (xxxx) and Date of Birth (mm/dd/yyyy) as indicated and click Submit. You will be taken to the next sign-up page. 5. Create a [employer] ID. This will be your [employer] login ID and cannot be changed, so think of one that is secure and easy to remember. 6. Create a [employer] password. You can change your password at any time. 7. Enter your Password Reset Question and Answer. This can be used at a later time if you forget your password. 8. Enter your e-mail address. You will receive e-mail notification when new information is available in [address] 9. Click Sign Up. You can now view and download portions of your medical record. 10. Click the Download Summary menu link to download a portable copy of your medical information. If you have questions, please visit the Frequently Asked Questions section of the [employer] website. Remember, [employer] is NOT to be used for urgent needs. For medical emergencies, dial 911. Now available from your iPhone and Android! Please provide this summary of care documentation to your next provider. Your primary care clinician is listed as Louise Iqbal. If you have any questions after today's visit, please call [phone].

## 2017-11-07 NOTE — PROGRESS NOTES
Amie Ordoñez is a 61 y.o. female who presents with the following:  Chief Complaint   Patient presents with    Abdominal Pain       Abdominal Pain   The history is provided by the patient (Patient released over the weekend from [facility] after small bowel obstruction with hypokalemia and low calcium and barely normal magnesium is in for follow-up today). Associated symptoms include abdominal pain (This is not present at this examination and she states she is much better). Pertinent negatives include no chest pain, no headaches and no shortness of breath. Associated symptoms comments: Patient has hypertension, morbid obesity, diabetes mellitus. .       No Known Allergies    Current Outpatient Prescriptions   Medication Sig    benzonatate (TESSALON) 200 mg capsule TAKE ONE CAPSULE BY MOUTH THREE TIMES DAILY AS NEEDED FOR  COUGH  FOR  UP  TO  7  DAYS    montelukast (SINGULAIR) 10 mg tablet TAKE ONE TABLET BY MOUTH ONCE DAILY    predniSONE (DELTASONE) 20 mg tablet 5 po every day x 4 days then 4 on day 5, 3 on day 6, 2 on day 7, and 1 on day 8    guaiFENesin (MUCINEX) 1,200 mg Ta12 ER tablet Take 1 Tab by mouth two (2) times a day.  dilTIAZem CD (CARDIZEM CD) 240 mg ER capsule Take 1 Cap by mouth daily.  hydroCHLOROthiazide (HYDRODIURIL) 50 mg tablet TAKE ONE TABLET BY MOUTH EVERY DAY    ipratropium (ATROVENT) 0.06 % nasal spray 1 [place] by Both Nostrils route four (4) times daily. Indications: RHINORRHEA    quinapril (ACCUPRIL) 20 mg tablet Take 1 Tab by mouth nightly. Indications: hypertension    ipratropium (ATROVENT) 0.02 % nebulizer solution 2.5 mL by Nebulization route as needed for Wheezing. Indications: MAINTENANCE THERAPY FOR ASTHMA    fluticasone-salmeterol (ADVAIR) 100-50 mcg/dose diskus inhaler Take 1 Puff by inhalation two (2) times a day.  Indications: MAINTENANCE THERAPY FOR ASTHMA    fluticasone (FLONASE) 50 mcg/actuation nasal spray 2 sprays per nostril daily    metFORMIN (GLUCOPHAGE) 500 mg tablet Take 1 Tab by mouth two (2) times daily (with meals).  ferrous sulfate 325 mg (65 mg iron) tablet Take 1 Tab by mouth Daily (before breakfast). Indications: IRON DEFICIENCY ANEMIA    albuterol (PROVENTIL HFA, VENTOLIN HFA, PROAIR HFA) 90 mcg/actuation inhaler Take 2 Puffs by inhalation every four (4) hours as needed for Wheezing or Shortness of Breath. No current facility-administered medications for this visit. Past Medical History:   Diagnosis Date    Anemia     Diabetes mellitus type II     Hypertension        Past Surgical History:   Procedure Laterality Date    HX GI  2006     bowel surgery for infection s/p colostomy and then removal     HX PELVIC LAPAROSCOPY         Family History   Problem Relation Age of Onset    Cancer Mother      breast    Diabetes Mother     Hypertension Sister     Diabetes Sister        Social History     Social History    Marital status:      Spouse name: N/A    Number of children: N/A    Years of education: N/A     Social History Main Topics    Smoking status: Former Smoker    Smokeless tobacco: Never Used    Alcohol use No    Drug use: No    Sexual activity: Not on file     Other Topics Concern    Not on file     Social History Narrative       Review of Systems   Constitutional: Negative for malaise/fatigue. HENT: Negative for congestion. Respiratory: Negative for shortness of breath. Cardiovascular: Negative for chest pain. Gastrointestinal: Positive for abdominal pain (This is not present at this examination and she states she is much better). Genitourinary: Negative for dysuria, frequency, hematuria and urgency. Musculoskeletal: Negative for myalgias. Skin: Negative for rash. Neurological: Negative for headaches. There were no vitals taken for this visit. Physical Exam   Constitutional: She is oriented to person, place, and time and well-developed, well-nourished, and in no distress.    A pleasant morbidly obese lady   HENT:   Head: Normocephalic and atraumatic. Right Ear: External ear normal.   Left Ear: External ear normal.   Mouth/Throat: Oropharynx is clear and moist.   Eyes: Conjunctivae and EOM are normal. Pupils are equal, round, and reactive to light. Right eye exhibits no discharge. Left eye exhibits no discharge. Neck: Normal range of motion. Neck supple. No tracheal deviation present. No thyromegaly present. Cardiovascular: Normal rate, regular rhythm, normal heart sounds and intact distal pulses. Exam reveals no gallop and no friction rub. No murmur heard. Pulmonary/Chest: Effort normal and breath sounds normal. No respiratory distress. She has no wheezes. She exhibits no tenderness. Abdominal: Soft. Bowel sounds are normal. She exhibits no distension and no mass. There is no tenderness. There is no rebound and no guarding. Musculoskeletal: She exhibits no edema or tenderness. Lymphadenopathy:     She has no cervical adenopathy. Neurological: She is alert and oriented to person, place, and time. She has normal reflexes. No cranial nerve deficit. She exhibits normal muscle tone. Gait normal. Coordination normal.   Skin: Skin is warm and dry. No rash noted. No erythema. No pallor. Psychiatric: Mood, memory, affect and judgment normal.         ICD-10-CM ICD-9-CM    1. Type 2 diabetes mellitus without complication, without long-term current use of insulin ([facility]) J55.1 127.95 METABOLIC PANEL, COMPREHENSIVE      LIPID PANEL      MICROALBUMIN, UR, RAND W/ MICROALBUMIN/CREA RATIO      HEMOGLOBIN A1C WITH EAG      COLLECTION VENOUS BLOOD,VENIPUNCTURE      METABOLIC PANEL, COMPREHENSIVE   2. Morbid obesity with BMI of 50.0-59.9, adult ([facility]) E66.01 278.01     Z68.43 V85.43    3. Small bowel obstruction K56.609 560.9    4. Hypokalemia X25.5 477.1 METABOLIC PANEL, COMPREHENSIVE   5. Anemia, unspecified type D64.9 285.9 IRON PROFILE   6.  Essential hypertension B26 230.4 METABOLIC PANEL, COMPREHENSIVE       Orders Placed This Encounter    COLLECTION VENOUS BLOOD,VENIPUNCTURE    METABOLIC PANEL, COMPREHENSIVE    LIPID PANEL    MICROALBUMIN, UR, RAND W/ MICROALBUMIN/CREA RATIO    HEMOGLOBIN A1C WITH EAG    IRON PROFILE    METABOLIC PANEL, COMPREHENSIVE     Standing Status:   Future     Standing Expiration Date:   5/7/2018       Follow-up Disposition: Not on Veronica Fung MD

## 2017-11-08 ENCOUNTER — TELEPHONE (OUTPATIENT)
Dept: FAMILY MEDICINE CLINIC | Age: 57
End: 2017-11-08

## 2017-11-08 LAB
ALBUMIN SERPL-MCNC: 3.9 G/DL (ref 3.5–5.5)
ALBUMIN/CREAT UR: 5.6 MG/G CREAT (ref 0–30)
ALBUMIN/GLOB SERPL: 1.6 {RATIO} (ref 1.2–2.2)
ALP SERPL-CCNC: 83 IU/L (ref 39–117)
ALT SERPL-CCNC: 10 IU/L (ref 0–32)
AST SERPL-CCNC: 11 IU/L (ref 0–40)
BILIRUB SERPL-MCNC: 0.2 MG/DL (ref 0–1.2)
BUN SERPL-MCNC: 15 MG/DL (ref 6–24)
BUN/CREAT SERPL: 24 (ref 9–23)
CALCIUM SERPL-MCNC: 8.8 MG/DL (ref 8.7–10.2)
CHLORIDE SERPL-SCNC: 97 MMOL/L (ref 96–106)
CHOLEST SERPL-MCNC: 137 MG/DL (ref 100–199)
CO2 SERPL-SCNC: 25 MMOL/L (ref 18–29)
CREAT SERPL-MCNC: 0.63 MG/DL (ref 0.57–1)
CREAT UR-MCNC: 92.4 MG/DL
EST. AVERAGE GLUCOSE BLD GHB EST-MCNC: 131 MG/DL
GFR SERPLBLD CREATININE-BSD FMLA CKD-EPI: 100 ML/MIN/1.73
GFR SERPLBLD CREATININE-BSD FMLA CKD-EPI: 115 ML/MIN/1.73
GLOBULIN SER CALC-MCNC: 2.4 G/DL (ref 1.5–4.5)
GLUCOSE SERPL-MCNC: 150 MG/DL (ref 65–99)
HBA1C MFR BLD: 6.2 % (ref 4.8–5.6)
HDLC SERPL-MCNC: 35 MG/DL
INTERPRETATION, 910389: NORMAL
IRON SATN MFR SERPL: 13 % (ref 15–55)
IRON SERPL-MCNC: 46 UG/DL (ref 27–159)
LDLC SERPL CALC-MCNC: 78 MG/DL (ref 0–99)
MICROALBUMIN UR-MCNC: 5.2 UG/ML
POTASSIUM SERPL-SCNC: 4.2 MMOL/L (ref 3.5–5.2)
PROT SERPL-MCNC: 6.3 G/DL (ref 6–8.5)
SODIUM SERPL-SCNC: 142 MMOL/L (ref 134–144)
TIBC SERPL-MCNC: 351 UG/DL (ref 250–450)
TRIGL SERPL-MCNC: 122 MG/DL (ref 0–149)
UIBC SERPL-MCNC: 305 UG/DL (ref 131–425)
VLDLC SERPL CALC-MCNC: 24 MG/DL (ref 5–40)

## 2017-11-08 RX ORDER — QUINAPRIL 20 MG/1
20 TABLET ORAL
Qty: 30 TAB | Refills: 6 | Status: SHIPPED | OUTPATIENT
Start: 2017-11-08 | End: 2017-12-05 | Stop reason: SDUPTHER

## 2017-11-08 NOTE — TELEPHONE ENCOUNTER
Pt called. She was seen yesterday and was told that a new medication and her Hydrochlorothiazide was to be increased. Please call to into [facility].

## 2017-11-08 NOTE — TELEPHONE ENCOUNTER
Tell her to take her get 2 of her diltiazem daily and to come back in 1-2 weeks for recheck of her blood pressure but she cannot increase the hydrochlorothiazide as she is already on a maximum amount of 50 mg daily

## 2017-11-30 RX ORDER — BENZONATATE 200 MG/1
CAPSULE ORAL
Qty: 21 CAP | Refills: 0 | Status: SHIPPED | OUTPATIENT
Start: 2017-11-30 | End: 2018-01-10 | Stop reason: SDUPTHER

## 2017-12-05 ENCOUNTER — OFFICE VISIT (OUTPATIENT)
Dept: FAMILY MEDICINE CLINIC | Age: 57
End: 2017-12-05

## 2017-12-05 VITALS
DIASTOLIC BLOOD PRESSURE: 81 MMHG | OXYGEN SATURATION: 98 % | RESPIRATION RATE: 20 BRPM | SYSTOLIC BLOOD PRESSURE: 167 MMHG | TEMPERATURE: 98.6 F | HEART RATE: 88 BPM | BODY MASS INDEX: 48.82 KG/M2 | WEIGHT: 293 LBS | HEIGHT: 65 IN

## 2017-12-05 DIAGNOSIS — E66.01 MORBID OBESITY WITH BMI OF 50.0-59.9, ADULT (HCC): Primary | ICD-10-CM

## 2017-12-05 DIAGNOSIS — I10 ESSENTIAL HYPERTENSION: ICD-10-CM

## 2017-12-05 DIAGNOSIS — E11.9 TYPE 2 DIABETES MELLITUS WITHOUT COMPLICATION, WITHOUT LONG-TERM CURRENT USE OF INSULIN (HCC): ICD-10-CM

## 2017-12-05 RX ORDER — QUINAPRIL 40 MG/1
40 TABLET ORAL
Qty: 90 TAB | Refills: 1 | Status: SHIPPED | OUTPATIENT
Start: 2017-12-05 | End: 2018-03-26

## 2017-12-05 NOTE — MR AVS SNAPSHOT
Visit Information Date & Time Provider Department Dept. Phone Encounter #  
 12/5/2017  2:00 PM Mariusz Prado MD [facility] [phone] [id number] Upcoming Health Maintenance Date Due Hepatitis C Screening 1960 EYE EXAM RETINAL OR DILATED Q1 12/13/1969 DTaP/Tdap/Td series (1 - Tdap) 12/13/1980 PAP AKA CERVICAL CYTOLOGY 12/13/1980 FOOT EXAM Q1 8/5/2017 FOBT Q 1 YEAR AGE 50-75 8/22/2017 HEMOGLOBIN A1C Q6M 5/7/2018 MICROALBUMIN Q1 11/7/2018 LIPID PANEL Q1 11/7/2018 BREAST CANCER SCRN MAMMOGRAM 1/31/2019 Allergies as of 12/5/2017  Review Complete On: 12/5/2017 By: Mariusz Prado MD  
 No Known Allergies Current Immunizations  Never Reviewed Name Date Influenza Vaccine (Quad) PF 10/27/2017  5:44 PM  
  
 Not reviewed this visit You Were Diagnosed With   
  
 Codes Comments Morbid obesity with BMI of 50.0-59.9, adult [facility]    -  Primary ICD-10-CM: E66.01, Z68.43 
ICD-9-CM: 278.01, V85.43 Type 2 diabetes mellitus without complication, without long-term current use of insulin ([facility])     ICD-10-CM: E11.9 ICD-9-CM: 250.00 Essential hypertension     ICD-10-CM: I10 
ICD-9-CM: 401.9 Vitals BP Pulse Temp Resp Height(growth percentile) Weight(growth percentile) 167/81 (BP 1 Location: Left arm) 88 98.6 °F (37 °C) 20 5' 5\" (1.651 m) 316 lb (143.3 kg) SpO2 BMI OB Status Smoking Status 98% 52.59 kg/m2 Postmenopausal Former Smoker Vitals History BMI and BSA Data Body Mass Index Body Surface Area 52.59 kg/m 2 2.56 m 2 Preferred Pharmacy Pharmacy Name Phone [facility] [place], [place] [address] [phone] Your Updated Medication List  
  
   
This list is accurate as of: 12/5/17  2:02 PM.  Always use your most recent med list.  
  
  
  
  
 albuterol 90 mcg/actuation inhaler Commonly known as:  PROVENTIL HFA, VENTOLIN HFA, PROAIR HFA Take 2 Puffs by inhalation every four (4) hours as needed for Wheezing or Shortness of Breath. benzonatate 200 mg capsule Commonly known as:  TESSALON  
TAKE ONE CAPSULE BY MOUTH THREE TIMES DAILY AS NEEDED FOR COUGH FOR  UP  TO  7  DAYS  
  
 dilTIAZem  mg ER capsule Commonly known as:  CARDIZEM CD Take 1 Cap by mouth daily. ferrous sulfate 325 mg (65 mg iron) tablet Take 1 Tab by mouth Daily (before breakfast). Indications: IRON DEFICIENCY ANEMIA  
  
 fluticasone 50 mcg/actuation nasal spray Commonly known as:  FLONASE  
2 sprays per nostril daily  
  
 fluticasone-salmeterol 100-50 mcg/dose diskus inhaler Commonly known as:  ADVAIR Take 1 Puff by inhalation two (2) times a day. Indications: MAINTENANCE THERAPY FOR ASTHMA  
  
 hydroCHLOROthiazide 50 mg tablet Commonly known as:  HYDRODIURIL  
TAKE ONE TABLET BY MOUTH EVERY DAY  
  
 * ipratropium 0.02 % Soln Commonly known as:  ATROVENT  
2.5 mL by Nebulization route as needed for Wheezing. Indications: MAINTENANCE THERAPY FOR ASTHMA * ipratropium 0.06 % nasal spray Commonly known as:  ATROVENT  
1 [place] by Both Nostrils route four (4) times daily. Indications: RHINORRHEA  
  
 metFORMIN 500 mg tablet Commonly known as:  GLUCOPHAGE Take 1 Tab by mouth two (2) times daily (with meals). montelukast 10 mg tablet Commonly known as:  SINGULAIR  
TAKE ONE TABLET BY MOUTH ONCE DAILY  
  
 quinapril 40 mg tablet Commonly known as:  ACCUPRIL Take 1 Tab by mouth nightly. Indications: hypertension * Notice: This list has 2 medication(s) that are the same as other medications prescribed for you. Read the directions carefully, and ask your doctor or other care provider to review them with you. Prescriptions Sent to Pharmacy  Refills  
 quinapril (ACCUPRIL) 40 mg tablet 1  
 Sig: Take 1 Tab by mouth nightly. Indications: hypertension Class: Normal  
 Pharmacy: [address] [place] [address] Ph #: [phone] Route: Oral  
  
We Performed the Following  DIABETES FOOT EXAM [HM7 Custom] Introducing [facility]! Raquel Cruz introduces [employer] patient portal. Now you can access parts of your medical record, email your doctor's office, and request medication refills online. 1. In your internet browser, go to [email or web address] 2. Click on the First Time User? Click Here link in the Sign In box. You will see the New Member Sign Up page. 3. Enter your [employer] Access Code exactly as it appears below. You will not need to use this code after youve completed the sign-up process. If you do not sign up before the expiration date, you must request a new code. · [employer] Access Code: [email or web address] Expires: 1/29/2018  8:24 AM 
 
4. Enter the last four digits of your Social Security Number (xxxx) and Date of Birth (mm/dd/yyyy) as indicated and click Submit. You will be taken to the next sign-up page. 5. Create a [employer] ID. This will be your [employer] login ID and cannot be changed, so think of one that is secure and easy to remember. 6. Create a [employer] password. You can change your password at any time. 7. Enter your Password Reset Question and Answer. This can be used at a later time if you forget your password. 8. Enter your e-mail address. You will receive e-mail notification when new information is available in [address] 9. Click Sign Up. You can now view and download portions of your medical record. 10. Click the Download Summary menu link to download a portable copy of your medical information. If you have questions, please visit the Frequently Asked Questions section of the [employer] website. Remember, [employer] is NOT to be used for urgent needs. For medical emergencies, dial 911. Now available from your iPhone and Android! Please provide this summary of care documentation to your next provider. Your primary care clinician is listed as Zain Cruz. If you have any questions after today's visit, please call [phone].

## 2017-12-05 NOTE — PROGRESS NOTES
Stone Santillan is a 61 y.o. female who presents with the following:  Chief Complaint   Patient presents with    Hypertension    Diabetes       Hypertension    The history is provided by the patient (Patient is feeling well but still not following her diet. The patient's blood pressure still running high but no chest pain or shortness of breath nor edema). Pertinent negatives include no chest pain, no orthopnea, no palpitations, no PND, no anxiety, no confusion, no malaise/fatigue, no blurred vision, no headaches, no tinnitus, no neck pain, no peripheral edema, no dizziness, no shortness of breath, no nausea and no vomiting. Diabetes   The history is provided by the patient (The patient is getting no exercise and is still eating too much.). Pertinent negatives include no chest pain, no abdominal pain, no headaches and no shortness of breath. No Known Allergies    Current Outpatient Prescriptions   Medication Sig    quinapril (ACCUPRIL) 40 mg tablet Take 1 Tab by mouth nightly. Indications: hypertension    benzonatate (TESSALON) 200 mg capsule TAKE ONE CAPSULE BY MOUTH THREE TIMES DAILY AS NEEDED FOR COUGH FOR  UP  TO  7  DAYS    montelukast (SINGULAIR) 10 mg tablet TAKE ONE TABLET BY MOUTH ONCE DAILY    dilTIAZem CD (CARDIZEM CD) 240 mg ER capsule Take 1 Cap by mouth daily.  hydroCHLOROthiazide (HYDRODIURIL) 50 mg tablet TAKE ONE TABLET BY MOUTH EVERY DAY    ipratropium (ATROVENT) 0.06 % nasal spray 1 [place] by Both Nostrils route four (4) times daily. Indications: RHINORRHEA    ipratropium (ATROVENT) 0.02 % nebulizer solution 2.5 mL by Nebulization route as needed for Wheezing. Indications: MAINTENANCE THERAPY FOR ASTHMA    fluticasone-salmeterol (ADVAIR) 100-50 mcg/dose diskus inhaler Take 1 Puff by inhalation two (2) times a day.  Indications: MAINTENANCE THERAPY FOR ASTHMA    fluticasone (FLONASE) 50 mcg/actuation nasal spray 2 sprays per nostril daily    metFORMIN (GLUCOPHAGE) 500 mg tablet Take 1 Tab by mouth two (2) times daily (with meals).  ferrous sulfate 325 mg (65 mg iron) tablet Take 1 Tab by mouth Daily (before breakfast). Indications: IRON DEFICIENCY ANEMIA    albuterol (PROVENTIL HFA, VENTOLIN HFA, PROAIR HFA) 90 mcg/actuation inhaler Take 2 Puffs by inhalation every four (4) hours as needed for Wheezing or Shortness of Breath. No current facility-administered medications for this visit. Past Medical History:   Diagnosis Date    Anemia     Diabetes mellitus type II     Hypertension        Past Surgical History:   Procedure Laterality Date    HX GI  2006     bowel surgery for infection s/p colostomy and then removal     HX PELVIC LAPAROSCOPY         Family History   Problem Relation Age of Onset    Cancer Mother      breast    Diabetes Mother     Hypertension Sister     Diabetes Sister        Social History     Social History    Marital status:      Spouse name: N/A    Number of children: N/A    Years of education: N/A     Social History Main Topics    Smoking status: Former Smoker    Smokeless tobacco: Never Used    Alcohol use No    Drug use: No    Sexual activity: Not on file     Other Topics Concern    Not on file     Social History Narrative       Review of Systems   Constitutional: Negative for malaise/fatigue. HENT: Negative for tinnitus. Eyes: Negative for blurred vision. Respiratory: Negative for shortness of breath. Cardiovascular: Negative for chest pain, palpitations, orthopnea and PND. Gastrointestinal: Negative for abdominal pain, nausea and vomiting. Musculoskeletal: Negative for neck pain. Neurological: Negative for dizziness and headaches. Psychiatric/Behavioral: Negative for confusion.        Visit Vitals    /81 (BP 1 Location: Left arm)    Pulse 88    Temp 98.6 °F (37 °C)    Resp 20    Ht 5' 5\" (1.651 m)    Wt 316 lb (143.3 kg)    SpO2 98%    BMI 52.59 kg/m2     Physical Exam   Constitutional: She is oriented to person, place, and time and well-developed, well-nourished, and in no distress. Patient is morbidly obese pleasant   HENT:   Head: Normocephalic and atraumatic. Right Ear: External ear normal.   Left Ear: External ear normal.   Mouth/Throat: Oropharynx is clear and moist.   Eyes: Conjunctivae and EOM are normal. Pupils are equal, round, and reactive to light. Right eye exhibits no discharge. Left eye exhibits no discharge. Neck: Normal range of motion. Neck supple. No tracheal deviation present. No thyromegaly present. Cardiovascular: Normal rate, regular rhythm, normal heart sounds and intact distal pulses. Exam reveals no gallop and no friction rub. No murmur heard. Pulmonary/Chest: Effort normal and breath sounds normal. No respiratory distress. She has no wheezes. She exhibits no tenderness. Abdominal: Soft. Bowel sounds are normal. She exhibits no distension and no mass. There is no tenderness. There is no rebound and no guarding. Musculoskeletal: She exhibits no edema or tenderness. Lymphadenopathy:     She has no cervical adenopathy. Neurological: She is alert and oriented to person, place, and time. She has normal reflexes. No cranial nerve deficit. She exhibits normal muscle tone. Gait normal. Coordination normal.   Skin: Skin is warm and dry. No rash noted. No erythema. No pallor. Psychiatric: Mood, memory, affect and judgment normal.         ICD-10-CM ICD-9-CM    1. Morbid obesity with BMI of 50.0-59.9, adult ([facility]) E66.01 278.01     Z68.43 V85.43    2. Type 2 diabetes mellitus without complication, without long-term current use of insulin ([facility]) E11.9 250.00  DIABETES FOOT EXAM   3. Essential hypertension I10 401.9 quinapril (ACCUPRIL) 40 mg tablet       Orders Placed This Encounter     DIABETES FOOT EXAM    quinapril (ACCUPRIL) 40 mg tablet     Sig: Take 1 Tab by mouth nightly.  Indications: hypertension     Dispense:  90 Tab     Refill:  1   Diabetic foot exam was carried out revealing the patient has 2 areas on each foot around the lateral and medial aspects of the ball that has very thickened callus formations that appear to be ready to split. Additionally the neurological was intact and the patient had normal feeling but her feet are crusted with athlete's foot up to her ankles. The patient's pulses were slightly decreased in the dorsalis pedis but the posterior tibials were 2+ bilaterally.     Follow-up Disposition: Not on Nancyvidhi Apodaca MD

## 2017-12-15 ENCOUNTER — TELEPHONE (OUTPATIENT)
Dept: FAMILY MEDICINE CLINIC | Age: 57
End: 2017-12-15

## 2018-01-10 ENCOUNTER — OFFICE VISIT (OUTPATIENT)
Dept: FAMILY MEDICINE CLINIC | Age: 58
End: 2018-01-10

## 2018-01-10 VITALS
RESPIRATION RATE: 18 BRPM | SYSTOLIC BLOOD PRESSURE: 167 MMHG | TEMPERATURE: 97.1 F | DIASTOLIC BLOOD PRESSURE: 83 MMHG | HEIGHT: 65 IN | BODY MASS INDEX: 48.82 KG/M2 | HEART RATE: 93 BPM | WEIGHT: 293 LBS | OXYGEN SATURATION: 97 %

## 2018-01-10 DIAGNOSIS — L50.9 HIVES: Primary | ICD-10-CM

## 2018-01-10 DIAGNOSIS — A08.4 VIRAL GASTROENTERITIS: ICD-10-CM

## 2018-01-10 DIAGNOSIS — J45.21 MILD INTERMITTENT ASTHMA WITH ACUTE EXACERBATION: ICD-10-CM

## 2018-01-10 RX ORDER — BENZONATATE 200 MG/1
CAPSULE ORAL
Qty: 21 CAP | Refills: 2 | Status: SHIPPED | OUTPATIENT
Start: 2018-01-10 | End: 2018-04-14

## 2018-01-10 RX ORDER — PREDNISONE 20 MG/1
TABLET ORAL
Qty: 15 TAB | Refills: 0 | Status: ON HOLD | OUTPATIENT
Start: 2018-01-10 | End: 2018-03-24

## 2018-01-10 NOTE — PROGRESS NOTES
Andrey Rizzo is a 61 y.o. female who presents with the following:  Chief Complaint   Patient presents with    Hypertension    Skin Problem     itching on arms and legs       Hypertension    The history is provided by the patient (Patient could not take her blood pressure medicine today because she was having nausea and vomiting with some diarrhea this morning. She also has been having some wheezing as well as breaking out in hives for the past several days. ). Associated symptoms include shortness of breath, nausea and vomiting. Pertinent negatives include no chest pain, no orthopnea, no palpitations, no PND, no malaise/fatigue, no blurred vision, no headaches, no peripheral edema and no dizziness. Skin Problem   Associated symptoms include shortness of breath. Pertinent negatives include no chest pain, no abdominal pain and no headaches. No Known Allergies    Current Outpatient Prescriptions   Medication Sig    benzonatate (TESSALON) 200 mg capsule TAKE ONE CAPSULE BY MOUTH THREE TIMES DAILY AS NEEDED FOR COUGH FOR  UP  TO  7  DAYS  Indications: Cough    predniSONE (DELTASONE) 20 mg tablet Take 5 tablets day one, take 4 tablets day two, take 3 tablets day three, take 2 tablets day four, take 1 tablet day five.  CARTIA  mg ER capsule TAKE ONE CAPSULE BY MOUTH ONCE DAILY (PLEASE  SCHEDULE  APPOINTMENT  FOR  FURTHER  REFILLS)    quinapril (ACCUPRIL) 40 mg tablet Take 1 Tab by mouth nightly. Indications: hypertension    montelukast (SINGULAIR) 10 mg tablet TAKE ONE TABLET BY MOUTH ONCE DAILY    ipratropium (ATROVENT) 0.06 % nasal spray 1 [place] by Both Nostrils route four (4) times daily. Indications: RHINORRHEA    fluticasone-salmeterol (ADVAIR) 100-50 mcg/dose diskus inhaler Take 1 Puff by inhalation two (2) times a day.  Indications: MAINTENANCE THERAPY FOR ASTHMA    fluticasone (FLONASE) 50 mcg/actuation nasal spray 2 sprays per nostril daily    metFORMIN (GLUCOPHAGE) 500 mg tablet Take 1 Tab by mouth two (2) times daily (with meals).  ferrous sulfate 325 mg (65 mg iron) tablet Take 1 Tab by mouth Daily (before breakfast). Indications: IRON DEFICIENCY ANEMIA    albuterol (PROVENTIL HFA, VENTOLIN HFA, PROAIR HFA) 90 mcg/actuation inhaler Take 2 Puffs by inhalation every four (4) hours as needed for Wheezing or Shortness of Breath. No current facility-administered medications for this visit. Past Medical History:   Diagnosis Date    Anemia     Diabetes mellitus type II     Hypertension        Past Surgical History:   Procedure Laterality Date    HX GI  2006     bowel surgery for infection s/p colostomy and then removal     HX PELVIC LAPAROSCOPY         Family History   Problem Relation Age of Onset    Cancer Mother      breast    Diabetes Mother     Hypertension Sister     Diabetes Sister        Social History     Social History    Marital status:      Spouse name: N/A    Number of children: N/A    Years of education: N/A     Social History Main Topics    Smoking status: Former Smoker    Smokeless tobacco: Never Used    Alcohol use No    Drug use: No    Sexual activity: Not Asked     Other Topics Concern    None     Social History Narrative       Review of Systems   Constitutional: Negative for fever and malaise/fatigue. Eyes: Negative for blurred vision. Respiratory: Positive for shortness of breath. Cardiovascular: Negative for chest pain, palpitations, orthopnea and PND. Gastrointestinal: Positive for diarrhea, nausea and vomiting. Negative for abdominal pain. Skin: Positive for itching and rash. Neurological: Negative for dizziness and headaches.        Visit Vitals    /83 (BP 1 Location: Left arm, BP Patient Position: Sitting)  Comment: not taken bp med this am    Pulse 93    Temp 97.1 °F (36.2 °C) (Oral)    Resp 18    Ht 5' 5\" (1.651 m)    Wt 322 lb (146.1 kg)    SpO2 97%    BMI 53.58 kg/m2     Physical Exam   Constitutional: She is oriented to person, place, and time and well-developed, well-nourished, and in no distress. Patient is morbidly obese pleasant   HENT:   Head: Normocephalic and atraumatic. Right Ear: External ear normal.   Left Ear: External ear normal.   Mouth/Throat: Oropharynx is clear and moist.   Eyes: Conjunctivae and EOM are normal. Pupils are equal, round, and reactive to light. Right eye exhibits no discharge. Left eye exhibits no discharge. Neck: Normal range of motion. Neck supple. No tracheal deviation present. No thyromegaly present. Cardiovascular: Normal rate, regular rhythm, normal heart sounds and intact distal pulses. Exam reveals no gallop and no friction rub. No murmur heard. Pulmonary/Chest: Effort normal and breath sounds normal. No respiratory distress. She has no wheezes. She exhibits no tenderness. Abdominal: Soft. Bowel sounds are normal. She exhibits no distension and no mass. There is no tenderness. There is no rebound and no guarding. Musculoskeletal: She exhibits no edema or tenderness. Lymphadenopathy:     She has no cervical adenopathy. Neurological: She is alert and oriented to person, place, and time. She has normal reflexes. No cranial nerve deficit. She exhibits normal muscle tone. Gait normal. Coordination normal.   Skin: Skin is warm and dry. Rash (Urticaria) noted. No erythema. No pallor. Psychiatric: Mood, memory, affect and judgment normal.         ICD-10-CM ICD-9-CM    1. Hives L50.9 708.9 predniSONE (DELTASONE) 20 mg tablet   2. Mild intermittent asthma with acute exacerbation J45.21 493.92 benzonatate (TESSALON) 200 mg capsule      predniSONE (DELTASONE) 20 mg tablet   3.  Viral gastroenteritis A08.4 008.8        Orders Placed This Encounter    benzonatate (TESSALON) 200 mg capsule     Sig: TAKE ONE CAPSULE BY MOUTH THREE TIMES DAILY AS NEEDED FOR COUGH FOR  UP  TO  7  DAYS  Indications: Cough     Dispense:  21 Cap     Refill:  2     Please consider 90 day supplies to promote better adherence    predniSONE (DELTASONE) 20 mg tablet     Sig: Take 5 tablets day one, take 4 tablets day two, take 3 tablets day three, take 2 tablets day four, take 1 tablet day five. Dispense:  15 Tab     Refill:  0   I believe the urticaria is most likely from hydrochlorothiazide so we will stop that particular medicine and place her on prednisone for 5 days warning her that her blood sugars will be up and recheck her again in 1 week. The patient was told that next week she should take her blood pressure medicine before coming to the office and we will recheck it at that time.   Follow-up Disposition: Not on Rubio Plasencia MD

## 2018-01-10 NOTE — MR AVS SNAPSHOT
Visit Information Date & Time Provider Department Dept. Phone Encounter #  
 1/10/2018  8:40 AM Daniel Dorman MD [facility] [phone] [id number] Upcoming Health Maintenance Date Due Hepatitis C Screening 1960 EYE EXAM RETINAL OR DILATED Q1 12/13/1969 DTaP/Tdap/Td series (1 - Tdap) 12/13/1980 PAP AKA CERVICAL CYTOLOGY 12/13/1980 FOBT Q 1 YEAR AGE 50-75 8/22/2017 HEMOGLOBIN A1C Q6M 5/7/2018 MICROALBUMIN Q1 11/7/2018 LIPID PANEL Q1 11/7/2018 FOOT EXAM Q1 12/5/2018 BREAST CANCER SCRN MAMMOGRAM 1/31/2019 Allergies as of 1/10/2018  Review Complete On: 1/10/2018 By: Daniel Dorman MD  
 No Known Allergies Current Immunizations  Never Reviewed Name Date Influenza Vaccine (Quad) PF 10/27/2017  5:44 PM  
  
 Not reviewed this visit You Were Diagnosed With   
  
 Codes Comments Hives    -  Primary ICD-10-CM: L50.9 ICD-9-CM: 708. 9 Vitals BP Pulse Temp Resp Height(growth percentile) Weight(growth percentile) 167/83 (BP 1 Location: Left arm, BP Patient Position: Sitting) 93 97.1 °F (36.2 °C) (Oral) 18 5' 5\" (1.651 m) 322 lb (146.1 kg) SpO2 BMI OB Status Smoking Status 97% 53.58 kg/m2 Postmenopausal Former Smoker Vitals History BMI and BSA Data Body Mass Index Body Surface Area 53.58 kg/m 2 2.59 m 2 Preferred Pharmacy Pharmacy Name Phone [facility] [phone] Your Updated Medication List  
  
   
This list is accurate as of: 1/10/18  9:47 AM.  Always use your most recent med list.  
  
  
  
  
 albuterol 90 mcg/actuation inhaler Commonly known as:  PROVENTIL HFA, VENTOLIN HFA, PROAIR HFA Take 2 Puffs by inhalation every four (4) hours as needed for Wheezing or Shortness of Breath. benzonatate 200 mg capsule Commonly known as:  TESSALON  
 TAKE ONE CAPSULE BY MOUTH THREE TIMES DAILY AS NEEDED FOR COUGH FOR  UP  TO  7  DAYS CARTIA  mg ER capsule Generic drug:  dilTIAZem CD  
TAKE ONE CAPSULE BY MOUTH ONCE DAILY (PLEASE  SCHEDULE  APPOINTMENT  FOR  FURTHER  REFILLS)  
  
 ferrous sulfate 325 mg (65 mg iron) tablet Take 1 Tab by mouth Daily (before breakfast). Indications: IRON DEFICIENCY ANEMIA  
  
 fluticasone 50 mcg/actuation nasal spray Commonly known as:  FLONASE  
2 sprays per nostril daily  
  
 fluticasone-salmeterol 100-50 mcg/dose diskus inhaler Commonly known as:  ADVAIR Take 1 Puff by inhalation two (2) times a day. Indications: MAINTENANCE THERAPY FOR ASTHMA * ipratropium 0.02 % Soln Commonly known as:  ATROVENT  
2.5 mL by Nebulization route as needed for Wheezing. Indications: MAINTENANCE THERAPY FOR ASTHMA * ipratropium 42 mcg (0.06 %) nasal spray Commonly known as:  ATROVENT  
1 [place] by Both Nostrils route four (4) times daily. Indications: RHINORRHEA  
  
 metFORMIN 500 mg tablet Commonly known as:  GLUCOPHAGE Take 1 Tab by mouth two (2) times daily (with meals). montelukast 10 mg tablet Commonly known as:  SINGULAIR  
TAKE ONE TABLET BY MOUTH ONCE DAILY  
  
 quinapril 40 mg tablet Commonly known as:  ACCUPRIL Take 1 Tab by mouth nightly. Indications: hypertension * Notice: This list has 2 medication(s) that are the same as other medications prescribed for you. Read the directions carefully, and ask your doctor or other care provider to review them with you. Introducing [facility]! Renita Laird introduces [employer] patient portal. Now you can access parts of your medical record, email your doctor's office, and request medication refills online. 1. In your internet browser, go to [email or web address] 2. Click on the First Time User? Click Here link in the Sign In box. You will see the New Member Sign Up page. 3. Enter your [employer] Access Code exactly as it appears below. You will not need to use this code after youve completed the sign-up process. If you do not sign up before the expiration date, you must request a new code. · [employer] Access Code: [email or web address] Expires: 1/29/2018  8:24 AM 
 
4. Enter the last four digits of your Social Security Number (xxxx) and Date of Birth (mm/dd/yyyy) as indicated and click Submit. You will be taken to the next sign-up page. 5. Create a [employer] ID. This will be your [employer] login ID and cannot be changed, so think of one that is secure and easy to remember. 6. Create a [employer] password. You can change your password at any time. 7. Enter your Password Reset Question and Answer. This can be used at a later time if you forget your password. 8. Enter your e-mail address. You will receive e-mail notification when new information is available in [address] 9. Click Sign Up. You can now view and download portions of your medical record. 10. Click the Download Summary menu link to download a portable copy of your medical information. If you have questions, please visit the Frequently Asked Questions section of the [employer] website. Remember, [employer] is NOT to be used for urgent needs. For medical emergencies, dial 911. Now available from your iPhone and Android! Please provide this summary of care documentation to your next provider. Your primary care clinician is listed as Daniel Temple. If you have any questions after today's visit, please call [phone].

## 2018-02-28 RX ORDER — METFORMIN HYDROCHLORIDE 500 MG/1
TABLET ORAL
Qty: 60 TAB | Refills: 5 | Status: SHIPPED | OUTPATIENT
Start: 2018-02-28 | End: 2020-02-04 | Stop reason: ALTCHOICE

## 2018-03-14 DIAGNOSIS — J40 BRONCHITIS: ICD-10-CM

## 2018-03-14 RX ORDER — MONTELUKAST SODIUM 10 MG/1
TABLET ORAL
Qty: 30 TAB | Refills: 5 | Status: SHIPPED | OUTPATIENT
Start: 2018-03-14

## 2018-03-24 ENCOUNTER — HOSPITAL ENCOUNTER (INPATIENT)
Age: 58
LOS: 2 days | Discharge: HOME | DRG: 682 | End: 2018-03-26
Attending: INTERNAL MEDICINE | Admitting: INTERNAL MEDICINE
Payer: COMMERCIAL

## 2018-03-24 ENCOUNTER — APPOINTMENT (OUTPATIENT)
Dept: ULTRASOUND IMAGING | Age: 58
DRG: 682 | End: 2018-03-24
Attending: INTERNAL MEDICINE
Payer: COMMERCIAL

## 2018-03-24 ENCOUNTER — APPOINTMENT (OUTPATIENT)
Dept: GENERAL RADIOLOGY | Age: 58
DRG: 682 | End: 2018-03-24
Attending: INTERNAL MEDICINE
Payer: COMMERCIAL

## 2018-03-24 DIAGNOSIS — Z98.890 S/P REPAIR OF VENTRAL HERNIA: Primary | ICD-10-CM

## 2018-03-24 DIAGNOSIS — Z87.19 S/P REPAIR OF VENTRAL HERNIA: Primary | ICD-10-CM

## 2018-03-24 PROBLEM — N19 RENAL FAILURE SYNDROME: Status: ACTIVE | Noted: 2018-03-24

## 2018-03-24 PROBLEM — N17.9 ACUTE RENAL FAILURE (HCC): Status: ACTIVE | Noted: 2018-03-24

## 2018-03-24 LAB
AMORPH CRY URNS QL MICRO: ABNORMAL
ANION GAP SERPL CALC-SCNC: 10 MMOL/L (ref 5–15)
ANION GAP SERPL CALC-SCNC: 8 MMOL/L (ref 5–15)
APPEARANCE UR: ABNORMAL
BACTERIA URNS QL MICRO: NEGATIVE /HPF
BASOPHILS # BLD: 0 K/UL (ref 0–0.1)
BASOPHILS NFR BLD: 0 % (ref 0–1)
BILIRUB UR QL CFM: NEGATIVE
BUN SERPL-MCNC: 61 MG/DL (ref 6–20)
BUN SERPL-MCNC: 66 MG/DL (ref 6–20)
BUN/CREAT SERPL: 17 (ref 12–20)
BUN/CREAT SERPL: 32 (ref 12–20)
CALCIUM SERPL-MCNC: 8.1 MG/DL (ref 8.5–10.1)
CALCIUM SERPL-MCNC: 8.2 MG/DL (ref 8.5–10.1)
CHLORIDE SERPL-SCNC: 103 MMOL/L (ref 97–108)
CHLORIDE SERPL-SCNC: 99 MMOL/L (ref 97–108)
CO2 SERPL-SCNC: 23 MMOL/L (ref 21–32)
CO2 SERPL-SCNC: 25 MMOL/L (ref 21–32)
COLOR UR: ABNORMAL
CREAT SERPL-MCNC: 2.07 MG/DL (ref 0.55–1.02)
CREAT SERPL-MCNC: 3.66 MG/DL (ref 0.55–1.02)
CREAT UR-MCNC: 165 MG/DL
DIFFERENTIAL METHOD BLD: ABNORMAL
EOSINOPHIL # BLD: 0 K/UL (ref 0–0.4)
EOSINOPHIL NFR BLD: 0 % (ref 0–7)
EPITH CASTS URNS QL MICRO: ABNORMAL /LPF
ERYTHROCYTE [DISTWIDTH] IN BLOOD BY AUTOMATED COUNT: 18.7 % (ref 11.5–14.5)
GLUCOSE BLD STRIP.AUTO-MCNC: 120 MG/DL (ref 65–100)
GLUCOSE BLD STRIP.AUTO-MCNC: 138 MG/DL (ref 65–100)
GLUCOSE BLD STRIP.AUTO-MCNC: 140 MG/DL (ref 65–100)
GLUCOSE BLD STRIP.AUTO-MCNC: 159 MG/DL (ref 65–100)
GLUCOSE SERPL-MCNC: 107 MG/DL (ref 65–100)
GLUCOSE SERPL-MCNC: 149 MG/DL (ref 65–100)
GLUCOSE UR STRIP.AUTO-MCNC: NEGATIVE MG/DL
GRAN CASTS URNS QL MICRO: ABNORMAL /LPF
HCT VFR BLD AUTO: 31.7 % (ref 35–47)
HGB BLD-MCNC: 9.5 G/DL (ref 11.5–16)
HGB UR QL STRIP: ABNORMAL
IMM GRANULOCYTES # BLD: 0.1 K/UL (ref 0–0.04)
IMM GRANULOCYTES NFR BLD AUTO: 1 % (ref 0–0.5)
KETONES UR QL STRIP.AUTO: NEGATIVE MG/DL
LEUKOCYTE ESTERASE UR QL STRIP.AUTO: ABNORMAL
LYMPHOCYTES # BLD: 1.7 K/UL (ref 0.8–3.5)
LYMPHOCYTES NFR BLD: 11 % (ref 12–49)
MAGNESIUM SERPL-MCNC: 2.9 MG/DL (ref 1.6–2.4)
MCH RBC QN AUTO: 22.2 PG (ref 26–34)
MCHC RBC AUTO-ENTMCNC: 30 G/DL (ref 30–36.5)
MCV RBC AUTO: 74.2 FL (ref 80–99)
MONOCYTES # BLD: 0.7 K/UL (ref 0–1)
MONOCYTES NFR BLD: 4 % (ref 5–13)
MUCOUS THREADS URNS QL MICRO: ABNORMAL /LPF
NEUTS SEG # BLD: 12.9 K/UL (ref 1.8–8)
NEUTS SEG NFR BLD: 84 % (ref 32–75)
NITRITE UR QL STRIP.AUTO: NEGATIVE
NRBC # BLD: 0.03 K/UL (ref 0–0.01)
NRBC BLD-RTO: 0.2 PER 100 WBC
PH UR STRIP: 5.5 [PH] (ref 5–8)
PLATELET # BLD AUTO: 436 K/UL (ref 150–400)
PMV BLD AUTO: 10.4 FL (ref 8.9–12.9)
POTASSIUM SERPL-SCNC: 4.2 MMOL/L (ref 3.5–5.1)
POTASSIUM SERPL-SCNC: 4.9 MMOL/L (ref 3.5–5.1)
PROT UR STRIP-MCNC: 30 MG/DL
RBC # BLD AUTO: 4.27 M/UL (ref 3.8–5.2)
RBC #/AREA URNS HPF: ABNORMAL /HPF (ref 0–5)
SERVICE CMNT-IMP: ABNORMAL
SODIUM SERPL-SCNC: 134 MMOL/L (ref 136–145)
SODIUM SERPL-SCNC: 134 MMOL/L (ref 136–145)
SODIUM UR-SCNC: 38 MMOL/L
SP GR UR REFRACTOMETRY: 1.02 (ref 1–1.03)
UA: UC IF INDICATED,UAUC: ABNORMAL
UROBILINOGEN UR QL STRIP.AUTO: 0.2 EU/DL (ref 0.2–1)
WBC # BLD AUTO: 15.3 K/UL (ref 3.6–11)
WBC URNS QL MICRO: ABNORMAL /HPF (ref 0–4)

## 2018-03-24 PROCEDURE — 80048 BASIC METABOLIC PNL TOTAL CA: CPT | Performed by: INTERNAL MEDICINE

## 2018-03-24 PROCEDURE — 83735 ASSAY OF MAGNESIUM: CPT | Performed by: INTERNAL MEDICINE

## 2018-03-24 PROCEDURE — 84300 ASSAY OF URINE SODIUM: CPT | Performed by: SURGERY

## 2018-03-24 PROCEDURE — 65270000029 HC RM PRIVATE

## 2018-03-24 PROCEDURE — 74011250637 HC RX REV CODE- 250/637: Performed by: INTERNAL MEDICINE

## 2018-03-24 PROCEDURE — 85025 COMPLETE CBC W/AUTO DIFF WBC: CPT | Performed by: INTERNAL MEDICINE

## 2018-03-24 PROCEDURE — 82570 ASSAY OF URINE CREATININE: CPT | Performed by: INTERNAL MEDICINE

## 2018-03-24 PROCEDURE — 76770 US EXAM ABDO BACK WALL COMP: CPT

## 2018-03-24 PROCEDURE — 77010033678 HC OXYGEN DAILY

## 2018-03-24 PROCEDURE — 81001 URINALYSIS AUTO W/SCOPE: CPT | Performed by: INTERNAL MEDICINE

## 2018-03-24 PROCEDURE — 80048 BASIC METABOLIC PNL TOTAL CA: CPT | Performed by: SURGERY

## 2018-03-24 PROCEDURE — 74022 RADEX COMPL AQT ABD SERIES: CPT

## 2018-03-24 PROCEDURE — 74011250636 HC RX REV CODE- 250/636: Performed by: INTERNAL MEDICINE

## 2018-03-24 PROCEDURE — 74011000250 HC RX REV CODE- 250: Performed by: INTERNAL MEDICINE

## 2018-03-24 PROCEDURE — 36415 COLL VENOUS BLD VENIPUNCTURE: CPT | Performed by: INTERNAL MEDICINE

## 2018-03-24 PROCEDURE — 51798 US URINE CAPACITY MEASURE: CPT

## 2018-03-24 PROCEDURE — 82962 GLUCOSE BLOOD TEST: CPT

## 2018-03-24 PROCEDURE — 74011000258 HC RX REV CODE- 258: Performed by: INTERNAL MEDICINE

## 2018-03-24 RX ORDER — MORPHINE SULFATE 4 MG/ML
2 INJECTION INTRAVENOUS
Status: DISCONTINUED | OUTPATIENT
Start: 2018-03-24 | End: 2018-03-26 | Stop reason: HOSPADM

## 2018-03-24 RX ORDER — HYDRALAZINE HYDROCHLORIDE 20 MG/ML
10 INJECTION INTRAMUSCULAR; INTRAVENOUS
Status: DISCONTINUED | OUTPATIENT
Start: 2018-03-24 | End: 2018-03-26 | Stop reason: HOSPADM

## 2018-03-24 RX ORDER — FLUTICASONE FUROATE AND VILANTEROL 100; 25 UG/1; UG/1
1 POWDER RESPIRATORY (INHALATION) DAILY
Status: DISCONTINUED | OUTPATIENT
Start: 2018-03-24 | End: 2018-03-26 | Stop reason: HOSPADM

## 2018-03-24 RX ORDER — SODIUM CHLORIDE 9 MG/ML
125 INJECTION, SOLUTION INTRAVENOUS CONTINUOUS
Status: DISCONTINUED | OUTPATIENT
Start: 2018-03-24 | End: 2018-03-24

## 2018-03-24 RX ORDER — LANOLIN ALCOHOL/MO/W.PET/CERES
325 CREAM (GRAM) TOPICAL
Status: DISCONTINUED | OUTPATIENT
Start: 2018-03-24 | End: 2018-03-26 | Stop reason: HOSPADM

## 2018-03-24 RX ORDER — IPRATROPIUM BROMIDE AND ALBUTEROL SULFATE 2.5; .5 MG/3ML; MG/3ML
3 SOLUTION RESPIRATORY (INHALATION)
Status: DISCONTINUED | OUTPATIENT
Start: 2018-03-24 | End: 2018-03-26 | Stop reason: HOSPADM

## 2018-03-24 RX ORDER — MONTELUKAST SODIUM 10 MG/1
10 TABLET ORAL
Status: DISCONTINUED | OUTPATIENT
Start: 2018-03-24 | End: 2018-03-26 | Stop reason: HOSPADM

## 2018-03-24 RX ORDER — POLYETHYLENE GLYCOL 3350 17 G/17G
17 POWDER, FOR SOLUTION ORAL DAILY
Status: DISCONTINUED | OUTPATIENT
Start: 2018-03-24 | End: 2018-03-26 | Stop reason: HOSPADM

## 2018-03-24 RX ORDER — ONDANSETRON 2 MG/ML
4 INJECTION INTRAMUSCULAR; INTRAVENOUS
Status: DISCONTINUED | OUTPATIENT
Start: 2018-03-24 | End: 2018-03-26 | Stop reason: HOSPADM

## 2018-03-24 RX ORDER — INSULIN LISPRO 100 [IU]/ML
INJECTION, SOLUTION INTRAVENOUS; SUBCUTANEOUS
Status: DISCONTINUED | OUTPATIENT
Start: 2018-03-24 | End: 2018-03-26 | Stop reason: HOSPADM

## 2018-03-24 RX ORDER — ACETAMINOPHEN 325 MG/1
650 TABLET ORAL
Status: DISCONTINUED | OUTPATIENT
Start: 2018-03-24 | End: 2018-03-26 | Stop reason: HOSPADM

## 2018-03-24 RX ORDER — DILTIAZEM HYDROCHLORIDE 120 MG/1
240 CAPSULE, COATED, EXTENDED RELEASE ORAL DAILY
Status: DISCONTINUED | OUTPATIENT
Start: 2018-03-24 | End: 2018-03-26 | Stop reason: HOSPADM

## 2018-03-24 RX ORDER — MAGNESIUM SULFATE 100 %
4 CRYSTALS MISCELLANEOUS AS NEEDED
Status: DISCONTINUED | OUTPATIENT
Start: 2018-03-24 | End: 2018-03-26 | Stop reason: HOSPADM

## 2018-03-24 RX ORDER — SODIUM CHLORIDE 0.9 % (FLUSH) 0.9 %
5-10 SYRINGE (ML) INJECTION AS NEEDED
Status: DISCONTINUED | OUTPATIENT
Start: 2018-03-24 | End: 2018-03-26 | Stop reason: HOSPADM

## 2018-03-24 RX ORDER — ATORVASTATIN CALCIUM 20 MG/1
20 TABLET, FILM COATED ORAL DAILY
Status: DISCONTINUED | OUTPATIENT
Start: 2018-03-24 | End: 2018-03-26 | Stop reason: HOSPADM

## 2018-03-24 RX ORDER — DEXTROSE 50 % IN WATER (D50W) INTRAVENOUS SYRINGE
12.5-25 AS NEEDED
Status: DISCONTINUED | OUTPATIENT
Start: 2018-03-24 | End: 2018-03-26 | Stop reason: HOSPADM

## 2018-03-24 RX ORDER — SODIUM CHLORIDE 0.9 % (FLUSH) 0.9 %
5-10 SYRINGE (ML) INJECTION EVERY 8 HOURS
Status: DISCONTINUED | OUTPATIENT
Start: 2018-03-24 | End: 2018-03-26 | Stop reason: HOSPADM

## 2018-03-24 RX ADMIN — SODIUM CHLORIDE 75 ML/HR: 900 INJECTION, SOLUTION INTRAVENOUS at 05:50

## 2018-03-24 RX ADMIN — SODIUM BICARBONATE: 84 INJECTION, SOLUTION INTRAVENOUS at 16:20

## 2018-03-24 RX ADMIN — MONTELUKAST SODIUM 10 MG: 10 TABLET, FILM COATED ORAL at 22:43

## 2018-03-24 RX ADMIN — ATORVASTATIN CALCIUM 20 MG: 20 TABLET, FILM COATED ORAL at 10:13

## 2018-03-24 RX ADMIN — FERROUS SULFATE TAB 325 MG (65 MG ELEMENTAL FE) 325 MG: 325 (65 FE) TAB at 10:13

## 2018-03-24 RX ADMIN — DILTIAZEM HYDROCHLORIDE 240 MG: 120 CAPSULE, COATED, EXTENDED RELEASE ORAL at 10:13

## 2018-03-24 RX ADMIN — FLUTICASONE FUROATE AND VILANTEROL TRIFENATATE 1 PUFF: 100; 25 POWDER RESPIRATORY (INHALATION) at 10:12

## 2018-03-24 RX ADMIN — Medication 10 ML: at 22:21

## 2018-03-24 RX ADMIN — SODIUM CHLORIDE 500 ML: 900 INJECTION, SOLUTION INTRAVENOUS at 05:46

## 2018-03-24 RX ADMIN — Medication 10 ML: at 05:47

## 2018-03-24 RX ADMIN — POLYETHYLENE GLYCOL 3350 17 G: 17 POWDER, FOR SOLUTION ORAL at 10:13

## 2018-03-24 NOTE — PROGRESS NOTES
MD Harris ordered transfer to Gen Surg. Pt is post op day 3 hernia repair. Surgeon consulting for possible compartment syndrome. Will have bladder pressure checked in CCU then transfer to room 2121.

## 2018-03-24 NOTE — PROGRESS NOTES
Surgery      Transduced bladder pressure 18, elevated but not high enough to account for renal failure. No indication for abdominal compartment release. Urine and serum lytes ordered. Will follow along. Catalino Oreilly.  Abigail Benites MD, [facility]

## 2018-03-24 NOTE — IP AVS SNAPSHOT
[address] [place] [address] 
[phone] Patient: Danny Griffith MRN: [id number] :1960 A check feroz indicates which time of day the medication should be taken. My Medications START taking these medications Instructions Each Dose to Equal  
 Morning Noon Evening Bedtime  
 ondansetron hcl 8 mg tablet Commonly known as:  ZOFRAN (AS HYDROCHLORIDE) Notes to Patient:  Not taking in hospital  
   
 Take 1 Tab by mouth every eight (8) hours as needed for Nausea. 8 mg  
    
   
   
   
  
 polyethylene glycol 17 gram packet Commonly known as:  Elicia Rudder Start taking on:  3/27/2018 Your last dose was: Today 3/26/18 at 8:30 am  
Your next dose is:  Tomorrow 3/27/18 Take 1 Packet by mouth daily. 17 g CHANGE how you take these medications Instructions Each Dose to Equal  
 Morning Noon Evening Bedtime  
 metFORMIN 500 mg tablet Commonly known as:  GLUCOPHAGE What changed:  Another medication with the same name was removed. Continue taking this medication, and follow the directions you see here. Your next dose is: Tonight 3/26/18 with supper Notes to Patient:  Not taking in hospital  
   
 TAKE ONE TABLET BY MOUTH TWICE DAILY WITH  MEALS With supper  
   
  
 oxyCODONE-acetaminophen 5-325 mg per tablet Commonly known as:  PERCOCET What changed:  when to take this Notes to Patient:  Not taking in hospital  
   
 Take 2 Tabs by mouth every six (6) hours as needed for Pain. Max Daily Amount: 8 Tabs. 2 Tab CONTINUE taking these medications Instructions Each Dose to Equal  
 Morning Noon Evening Bedtime  
 albuterol 90 mcg/actuation inhaler Commonly known as:  PROVENTIL HFA, VENTOLIN HFA, PROAIR HFA Notes to Patient:  Not taking in hospital  
   
 Take 2 Puffs by inhalation every four (4) hours as needed for Wheezing or Shortness of Breath. 2 Puff  
    
   
   
   
  
 atorvastatin 20 mg tablet Commonly known as:  LIPITOR Your last dose was: Today 3/26/18 at 8:30 am  
Your next dose is:  Tomorrow 3/27/18 Take 20 mg by mouth daily. 20 mg  
    
   
   
   
  
 benzonatate 200 mg capsule Commonly known as:  TESSALON Notes to Patient:  Not taking in hospital  
   
 TAKE ONE CAPSULE BY MOUTH THREE TIMES DAILY AS NEEDED FOR COUGH FOR  UP  TO  7  DAYS  Indications: Cough  
     
   
   
   
  
 calcium carbonate 600 mg calcium (1,500 mg) tablet Commonly known as:  Marylee Gull Notes to Patient:  Not taking in hospital  
   
 Take 600 mg by mouth daily. 600 mg CARTIA  mg ER capsule Generic drug:  dilTIAZem CD Your last dose was: Today 3/26/18 at 8:30 am  
Your next dose is:  Tomorrow 3/27/18 TAKE ONE CAPSULE BY MOUTH ONCE DAILY (PLEASE  SCHEDULE  APPOINTMENT  FOR  FURTHER  REFILLS) ergocalciferol 50,000 unit capsule Commonly known as:  ERGOCALCIFEROL Notes to Patient:  Not taking in hospital  
   
 Take 50,000 Units by mouth every seven (7) days. [id number] Units  
    
   
   
   
  
 ferrous sulfate 325 mg (65 mg iron) tablet Your last dose was: Today 3/26/18 at 8:30 am  
Your next dose is:  Tomorrow 3/27/18 Take 1 Tab by mouth Daily (before breakfast). Indications: IRON DEFICIENCY ANEMIA  
 325 mg  
    
   
   
   
  
 fluticasone 50 mcg/actuation nasal spray Commonly known as:  Skylar Navas Notes to Patient:  Not taking in hospital  
   
 2 sprays per nostril daily  
     
   
   
   
  
 fluticasone-salmeterol 100-50 mcg/dose diskus inhaler Commonly known as:  ADVAIR Your last dose was: Today you took the [facility] inhaler in place of the Advair. Your next dose is: Tonight 3/26/18 before bed Notes to Patient:  Not taking in hospital  
   
 Take 1 Puff by inhalation two (2) times a day. Indications: MAINTENANCE THERAPY FOR ASTHMA  
 1 Puff  
    
   
   
   
  
  
 ipratropium 42 mcg (0.06 %) nasal spray Commonly known as:  ATROVENT Notes to Patient:  Not taking in hospital  
   
 1 [place] by Both Nostrils route four (4) times daily. Indications: RHINORRHEA  
 1 Spray  
    
   
   
   
  
 montelukast 10 mg tablet Commonly known as:  SINGULAIR Your last dose was:  Yesterday 3/25/18 at 10 pm  
Your next dose is: Tonight 3/26/18 TAKE ONE TABLET BY MOUTH ONCE DAILY  
     
   
   
   
  
  
 VITAMIN C 500 mg tablet Generic drug:  ascorbic acid (vitamin C) Notes to Patient:  Not taking in hospital  
   
 Take 500 mg by mouth daily. 500 mg  
    
   
   
   
  
  
STOP taking these medications KEFLEX 500 mg capsule Generic drug:  cephALEXin  
   
  
 quinapril 40 mg tablet Commonly known as:  ACCUPRIL Where to Get Your Medications Information on where to get these meds will be given to you by the nurse or doctor. ! Ask your nurse or doctor about these medications  
  ondansetron hcl 8 mg tablet  
 oxyCODONE-acetaminophen 5-325 mg per tablet  
 polyethylene glycol 17 gram packet

## 2018-03-24 NOTE — PROGRESS NOTES
Repeat BMP this AM showed bumped creat to 3.6. Not sure if not enough fluids given or if this is truly a worsened creat. Will get renal US this AM, repeat bmp at noon. May need nephrology to follow.   Bear Casas MD

## 2018-03-24 NOTE — PROGRESS NOTES
Surgical consult called, Dr. Edi Walden wants to speak with hospitalist, paging Dr. Jennifer Clemente to let him know to call surgeon directly

## 2018-03-24 NOTE — H&P
Hospitalist Admission Note    NAME: Pawan Gupta   :  1960   MRN:  [id number]     Date/Time:  3/24/2018 3:58 AM    Patient PCP: JENNY Sauceda  ________________________________________________________________________    Given the patient's current clinical presentation, I have a high level of concern for decompensation if discharged from the emergency department. Complex decision making was performed, which includes reviewing the patient's available past medical records, laboratory results, and x-ray films. My assessment of this patient's clinical condition and my plan of care is as follows. Assessment / Plan:  Acute renal failure strongly suspect prerenal etiology  Dehydration  -ivf NS 1L now  -urine is dark in room, UA from [facility] neg for UTI  -leave nicolas for now, consider dc this AM once more mobile and opioids have worn off  -creat should improve with IVF NS and PO intake of water however she did receive toradol in [facility] ED for pain and this may impair her renal function. Further is on ACEI at home and this could impair her function too. Will hold the ACEI, avoid nephrotoxic medications and follow carefully with repeat CREAT this AM.  -will need to have the anesthesia report faxed. Need to ensure there was no hypotension intraoperative during the ventral hernia repair. This could explain bumped creat as well with ATN seen currently. Will follow. Acute encephalopathy due to over medication (percocet)  Recent ventral hernia repair  Constipation  Suspect ROQUE/OHS  -no further use of opioids - strongly suspect her chronic use of percocet and poor PO intake with continued consumption of her home OP PO medications has led to this state.  Do not feel she is uremic causing this confusion/somnolence.   -dc percocet  -tylenol only  -up OOB to moving  -miralax for bowel regimen - no bm since surgery - this needs to be followed to ensure post OP bm occurs  -snoring in room, somnolence, mild hypoxia to 89% RA with sleep - suspect she will require a sleep study at [place]. o2 with rest at 2L now. Diabetes Mellitus 2 Uncontrolled, with hyperglycemia  -ssi + POC bs while IP  -po intake being followed    hypermorbid obesity, NOS  -patient weighs 320# - encouraged weight loss    Hypertension, Benign essential   -will look to hold antihypertensives for now - thiazide could be resumed once renal function returns to normal as could the ACEI - both good choices with dm2  -hydralazine PRN sbp >170  -remain on tele    I have personally reviewed the radiographs, laboratory data in Epic and decisions and statements above are based partially on this personal interpretation. Code Status: Full Code  DVT Prophylaxis: SCD's  GI Prophylaxis: not indicated        Subjective:   CHIEF COMPLAINT: \"i feel ok\"    HISTORY OF PRESENT ILLNESS:     aMlissa Bruno is a 61 y.o.  female with known history as listed below presents to ED with complaint noted above. Available records were reviewed at the time of H&P. Patient recently s/p ventral hernia repair at OSH on 3/21. She tolerated surgery well. No BM post op. Further given percocet which the family has given RTC to patient. She has been more somnolent per family report to OP ED MD at [facility]. They are not at [id number] to given history. She has continued to consume her home medications to include cartia xt, accupril, metformin, singulair, lipitor. Decreased PO intake due to somnolence. +dark urine per family. Brought to ED with the increased lethargy. Patient was able to answer questions but more sleepy. Roused easily. Not confused. Protecting airway. Labs showed ARF. She was normal preop. In ED BUN 50's with creat 3.2. UA without UTI and she was voiding. Broderick placed for possible postobstrutive picture regardless. No US on premises. [place] [id number] given ARF. She did receive toradol in the ED prior to call for transfer as well as a x1L NS bolus. Upon arrival she was seen by me in room.  She was sleepy but rouses easily. No family in room. She notes no pains other than soreness from her surgery. She is not getting gup much. No BM since preop. Last percocet was 1600 on 3/23. No f/c/nv/d. We were asked to admit for work up and evaluation of the above problems. Past Medical History:   Diagnosis Date    Anemia     Diabetes mellitus type II     Hypertension     Menopause       Past Surgical History:   Procedure Laterality Date    HX GI  2006     bowel surgery for infection s/p colostomy and then removal     HX PELVIC LAPAROSCOPY       Social History   Substance Use Topics    Smoking status: Former Smoker    Smokeless tobacco: Never Used    Alcohol use No      Family History   Problem Relation Age of Onset    Diabetes Mother     Breast Cancer Mother     Hypertension Sister     Breast Cancer Sister     Diabetes Sister         No Known Allergies     Prior to Admission medications    Medication Sig Start Date End Date Taking? Authorizing Provider   oxyCODONE-acetaminophen (PERCOCET) 5-325 mg per tablet Take 2 Tabs by mouth every four (4) hours as needed for Pain. Lorena Lau MD   atorvastatin (LIPITOR) 20 mg tablet Take 20 mg by mouth daily. Lorena aLu MD   calcium carbonate (CALTREX) 600 mg calcium (1,500 mg) tablet Take 600 mg by mouth daily. Lorena Lau MD   cephALEXin (KEFLEX) 500 mg capsule Take 500 mg by mouth four (4) times daily. Lorena Lau MD   hydroCHLOROthiazide (HYDRODIURIL) 50 mg tablet Take 25 mg by mouth daily. Lorena Lau MD   ascorbic acid, vitamin C, (VITAMIN C) 500 mg tablet Take 500 mg by mouth daily. Lorena Lau MD   ergocalciferol (ERGOCALCIFEROL) 50,000 unit capsule Take 50,000 Units by mouth every seven (7) days.     Lorena Lau MD   montelukast (SINGULAIR) 10 mg tablet TAKE ONE TABLET BY MOUTH ONCE DAILY 3/14/18   Lima Conrad MD   metFORMIN (GLUCOPHAGE) 500 mg tablet TAKE ONE TABLET BY MOUTH TWICE DAILY WITH  MEALS 2/28/18   Lima Conrad, MD   benzonatate (TESSALON) 200 mg capsule TAKE ONE CAPSULE BY MOUTH THREE TIMES DAILY AS NEEDED FOR COUGH FOR  UP  TO  7  DAYS  Indications: Cough 1/10/18   Farzaneh Duran., MD   CARTIA  mg ER capsule TAKE ONE CAPSULE BY MOUTH ONCE DAILY (PLEASE  SCHEDULE  APPOINTMENT  FOR  FURTHER  REFILLS) 1/3/18   Farzaneh Rivera MD   quinapril (ACCUPRIL) 40 mg tablet Take 1 Tab by mouth nightly. Indications: hypertension 12/5/17   Farzaneh Rivera MD   ipratropium (ATROVENT) 0.06 % nasal spray 1 [place] by Both Nostrils route four (4) times daily. Indications: RHINORRHEA 7/12/17   Farzaneh Rivera MD   fluticasone-salmeterol (ADVAIR) 100-50 mcg/dose diskus inhaler Take 1 Puff by inhalation two (2) times a day. Indications: MAINTENANCE THERAPY FOR ASTHMA 3/3/17   Chase Fang NP   fluticasone [facility]) 50 mcg/actuation nasal spray 2 sprays per nostril daily 11/25/16   Chase Fang NP   metFORMIN (GLUCOPHAGE) 500 mg tablet Take 1 Tab by mouth two (2) times daily (with meals). 10/3/16   Jennifer Llamas MD   ferrous sulfate 325 mg (65 mg iron) tablet Take 1 Tab by mouth Daily (before breakfast). Indications: IRON DEFICIENCY ANEMIA 8/12/16   JENNY Werner   albuterol (PROVENTIL HFA, VENTOLIN HFA, PROAIR HFA) 90 mcg/actuation inhaler Take 2 Puffs by inhalation every four (4) hours as needed for Wheezing or Shortness of Breath.  7/8/16   JENNY Werner     REVIEW OF SYSTEMS:  See HPI for details  General: negative for fever, chills, sweats, weakness, weight loss  Eyes: negative for blurred vision, eye pain, loss of vision, diplopia  Ear Nose and Throat: negative for rhinorrhea, pharyngitis, otalgia, tinnitus, speech or swallowing difficulties  Respiratory:  negative for pleuritic pain, cough, sputum production, wheezing, SOB, GALLARDO  Cardiology:  negative for chest pain, palpitations, orthopnea, PND, edema, syncope   Gastrointestinal: negative for abdominal pain, N/V, dysphagia, change in bowel habits, bleeding  Genitourinary: negative for frequency, urgency, dysuria, hematuria, incontinence  Muskuloskeletal : negative for arthralgia, myalgia  Hematology: negative for easy bruising, bleeding, lymphadenopathy  Dermatological: negative for rash, ulceration, mole change, new lesion  Endocrine: negative for hot flashes or polydipsia  Neurological: +excessive sleeping with use of scheduled percocet.  negative for headache, dizziness, confusion, focal weakness, paresthesia, memory loss, gait disturbance  Psychological: negative for anxiety, depression, agitation    Objective:   VITALS:    Visit Vitals    Ht 5' 5\" (1.651 m)    Wt 149.8 kg (330 lb 3.2 oz)    BMI 54.95 kg/m2     PHYSICAL EXAM:     GENERAL:    WD y   WN y   Cachectic    Thin    Obese y   Disheveled y   Ill Appearing Critically    Ill Appearing Chronically y   Acute Distress y   Other      HEENT:    NC/AT/EOMI y   PERRLA y   Conjunctivae Pink    Conjunctivae Pale y   Moist Mucosa    Dry Mucosa y   Hearing intact to voice y   Other      NECK:    Supple y   Masses n   Thyroid Tender n   Other Large circumference, mild snoring with rest in room                  RESPIRATORY:    CTA bilaterally WITHOUT wheezing/rhonchi/rales or crackles y   Wheezing    Rhonchi    Crackles    Use of accessory muscles n   Other      CARDIAC:    regular rate and rhythm No murmurs/rubs/gallops y   Murmur    Rubs    Gallops    Rate Regular/Irregular reg   Carotid Bruit Left/Right n   Lower Extremity Edema 1+   JVP  n   Other Normal capillary refill     ABDOMEN:    Soft non distended non tender +bowel sounds no HSM    Rigid n   Tenderness y over surgical site   Hepatomegaly n   Splenomegaly n   Distended    Increased girth due to habitus y   Normal/Hyper/Hypo Active Bowel Sounds hypo   Other Surgical site c/d/i     SKIN / MUSCULOSKELETAL:    Rashes n   Ecchymosis n   Ulcers    Tight to palpitation n   Turgor Good/Poor g   Cyanosis/Clubbing n   Amputation(s) n   Other NEUROLOGY:    cranial nerves II-XII grossly intact y   Cranial Nerve Deficit    Facial Droop n   Slurred Speech n   Aphasia    Strength Normal    Weakness y global   Meningismus/Kernig's Sign/ Brudzinsky n   Follows Commands y   Other Sleepy both from trip to [facility], early AM time, percocet use - does rouse     PSYCHIATRIC:    AAOx3 in no acute distress y   Insight Poor y   Insight Good    Alert and Oriented to Person     Alert and Oriented to Place    Alert and Oriented toTime    Depressed n   Anxious n   Agitated    Lethargic y   Stuporous    Sedated n   Other    _______________________________________________________________________  Care Plan discussed with:    Comments   Patient y Discussed with patient in room. POC outlined and Questions answered (25   Family   None in room   RN x    Care Manager                    Consultant:  x ED MD [facility] 10 and Hospitalist 10   _______________________________________________________________________  Recommended Disposition:   Home with Family y   HH/PT/OT/RN y   SNF/LTC y   [de-identified]    ________________________________________________________________________  TOTAL TIME:  61 Minutes    Critical Care Provided     Minutes non procedure based      Comments   >50% of visit spent in counseling and coordination of care x Chart review  Discussion with patient and/or family and questions answered     ________________________________________________________________________  Signed: Little Cason MD    This note will not be viewable in [address] Procedures: see electronic medical records for all procedures/Xrays and details which were not copied into this note but were reviewed prior to creation of Plan.     LAB DATA REVIEWED:    Recent Results (from the past 24 hour(s))   CBC WITH AUTOMATED DIFF    Collection Time: 03/23/18  8:30 PM   Result Value Ref Range    WBC 19.7 (H) 3.6 - 11.0 K/uL    RBC 4.59 3.80 - 5.20 M/uL    HGB 10.1 (L) 11.5 - 16.0 g/dL    HCT 33.6 (L) 35.0 - 47.0 % MCV 73.2 (L) 80.0 - 99.0 FL    MCH 22.0 (L) 26.0 - 34.0 PG    MCHC 30.1 30.0 - 36.5 g/dL    RDW 18.6 (H) 11.5 - 14.5 %    PLATELET 656 (H) 748 - 400 K/uL    MPV 10.2 8.9 - 12.9 FL    NRBC 0.2 (H) 0  WBC    ABSOLUTE NRBC 0.04 (H) 0.00 - 0.01 K/uL    NEUTROPHILS 87 (H) 32 - 75 %    LYMPHOCYTES 9 (L) 12 - 49 %    MONOCYTES 3 (L) 5 - 13 %    EOSINOPHILS 0 0 - 7 %    BASOPHILS 0 0 - 1 %    IMMATURE GRANULOCYTES 1 (H) 0.0 - 0.5 %    ABS. NEUTROPHILS 17.2 (H) 1.8 - 8.0 K/UL    ABS. LYMPHOCYTES 1.8 0.8 - 3.5 K/UL    ABS. MONOCYTES 0.7 0.0 - 1.0 K/UL    ABS. EOSINOPHILS 0.0 0.0 - 0.4 K/UL    ABS. BASOPHILS 0.0 0.0 - 0.1 K/UL    ABS. IMM. GRANS. 0.1 (H) 0.00 - 0.04 K/UL    DF AUTOMATED     METABOLIC PANEL, COMPREHENSIVE    Collection Time: 03/23/18  8:30 PM   Result Value Ref Range    Sodium 137 136 - 145 mmol/L    Potassium 3.8 3.5 - 5.1 mmol/L    Chloride 96 (L) 97 - 108 mmol/L    CO2 26 21 - 32 mmol/L    Anion gap 15 5 - 15 mmol/L    Glucose 132 (H) 65 - 100 mg/dL    BUN 54 (H) 7.0 - 18.0 MG/DL    Creatinine 3.18 (H) 0.55 - 1.02 MG/DL    BUN/Creatinine ratio 17 12 - 20      GFR est AA 18 (L) >60 ml/min/1.73m2    GFR est non-AA 15 (L) >60 ml/min/1.73m2    Calcium 8.2 (L) 8.5 - 10.1 MG/DL    Bilirubin, total 0.5 0.2 - 1.0 MG/DL    ALT (SGPT) 41 14 - 63 U/L    AST (SGOT) 62 (H) 15 - 37 U/L    Alk.  phosphatase 88 45 - 117 U/L    Protein, total 7.6 6.4 - 8.2 g/dL    Albumin 2.9 (L) 3.5 - 5.0 g/dL    Globulin 4.7 (H) 2.0 - 4.0 g/dL    A-G Ratio 0.6 (L) 1.1 - 2.2     LIPASE    Collection Time: 03/23/18  8:30 PM   Result Value Ref Range    Lipase 87 73 - 393 U/L   URINALYSIS W/ RFLX MICROSCOPIC    Collection Time: 03/23/18  8:30 PM   Result Value Ref Range    Color YELLOW      Appearance CLEAR CLEAR      Specific gravity 1.030 1.003 - 1.030      pH (UA) 5.0 5.0 - 8.0      Protein 30 (A) NEG mg/dL    Glucose NEGATIVE  NEG mg/dL    Ketone NEGATIVE  NEG mg/dL    Blood MODERATE (A) NEG      Urobilinogen 0.2 0.2 - 1.0 EU/dL Nitrites NEGATIVE  NEG      Leukocyte Esterase NEGATIVE  NEG     BILIRUBIN, CONFIRM    Collection Time: 03/23/18  8:30 PM   Result Value Ref Range    Bilirubin UA, confirm NEGATIVE  NEG     URINE MICROSCOPIC ONLY    Collection Time: 03/23/18  8:30 PM   Result Value Ref Range    WBC 0-4 0 - 4 /hpf    RBC 10-20 0 - 5 /hpf    Epithelial cells FEW FEW /lpf    Bacteria NEGATIVE  NEG /hpf   CK W/ CKMB & INDEX    Collection Time: 03/23/18 10:15 PM   Result Value Ref Range    CK 2093 (H) 26 - 192 U/L    CK - MB 11.9 (H) <3.6 NG/ML    CK-MB Index 0.6 0 - 2.5     METABOLIC PANEL, BASIC    Collection Time: 03/23/18 10:26 PM   Result Value Ref Range    Sodium 138 136 - 145 mmol/L    Potassium 4.0 3.5 - 5.1 mmol/L    Chloride 98 97 - 108 mmol/L    CO2 29 21 - 32 mmol/L    Anion gap 11 5 - 15 mmol/L    Glucose 124 (H) 65 - 100 mg/dL    BUN 56 (H) 7.0 - 18.0 MG/DL    Creatinine 3.27 (H) 0.55 - 1.02 MG/DL    BUN/Creatinine ratio 17 12 - 20      GFR est AA 18 (L) >60 ml/min/1.73m2    GFR est non-AA 15 (L) >60 ml/min/1.73m2    Calcium 8.2 (L) 8.5 - 10.1 MG/DL   LACTIC ACID    Collection Time: 03/23/18 10:26 PM   Result Value Ref Range    Lactic acid 1.8 0.4 - 2.0 MMOL/L   TROPONIN I    Collection Time: 03/23/18 10:28 PM   Result Value Ref Range    Troponin-I, Qt. 0.06 <0.08 ng/mL   BNP    Collection Time: 03/23/18 10:29 PM   Result Value Ref Range    BNP 58 0 - 100 pg/mL   POC G3 - PUL    Collection Time: 03/23/18 11:32 PM   Result Value Ref Range    FIO2 (POC) 28 %    pH (POC) 7.349 (L) 7.35 - 7.45      pCO2 (POC) 44.4 35.0 - 45.0 MMHG    pO2 (POC) 75 (L) 80 - 100 MMHG    HCO3 (POC) 24.4 22 - 26 MMOL/L    sO2 (POC) 94 92 - 97 %    Base deficit (POC) 1 mmol/L    Site LEFT RADIAL      Device: NASAL CANNULA      Flow rate (POC) 2 L/M    Allens test (POC) YES      Specimen type (POC) ARTERIAL      Total resp.  rate 13

## 2018-03-24 NOTE — IP AVS SNAPSHOT
[address] [facility] 
[phone] Patient: Jerry Montoya MRN: [id number] :1960 About your hospitalization You were admitted on:  2018 You last received care in the:  [facility] 2 GENERAL SURGERY You were discharged on:  2018 Why you were hospitalized Your primary diagnosis was:  Not on File Your diagnoses also included:  Acute Renal Failure (Hcc), Morbid Obesity With Bmi Of 50.0-59.9, Adult (Hcc), S/P Repair Of Ventral Hernia Follow-up Information Follow up With Details Comments Contact Info Ginette Garcia On 3/30/2018 APPOINTMENT TIME: 9:40AM [facility] [address] 
[phone] Your Scheduled Appointments   1:00 PM EDT  
PT GENERAL F/U with Ibeth Jordan, PT  
[facility]) [place] [id number] [phone] Discharge Orders None A check feroz indicates which time of day the medication should be taken. My Medications START taking these medications Instructions Each Dose to Equal  
 Morning Noon Evening Bedtime  
 ondansetron hcl 8 mg tablet Commonly known as:  ZOFRAN (AS HYDROCHLORIDE) Notes to Patient:  Not taking in hospital  
   
 Take 1 Tab by mouth every eight (8) hours as needed for Nausea. 8 mg  
    
   
   
   
  
 polyethylene glycol 17 gram packet Commonly known as:  Ernestene Score Start taking on:  3/27/2018 Your last dose was: Today 3/26/18 at 8:30 am  
Your next dose is:  Tomorrow 3/27/18 Take 1 Packet by mouth daily. 17 g CHANGE how you take these medications Instructions Each Dose to Equal  
 Morning Noon Evening Bedtime  
 metFORMIN 500 mg tablet Commonly known as:  GLUCOPHAGE What changed:  Another medication with the same name was removed.  Continue taking this medication, and follow the directions you see here. Your next dose is: Tonight 3/26/18 with supper Notes to Patient:  Not taking in hospital  
   
 TAKE ONE TABLET BY MOUTH TWICE DAILY WITH  MEALS With supper  
   
  
 oxyCODONE-acetaminophen 5-325 mg per tablet Commonly known as:  PERCOCET What changed:  when to take this Notes to Patient:  Not taking in hospital  
   
 Take 2 Tabs by mouth every six (6) hours as needed for Pain. Max Daily Amount: 8 Tabs. 2 Tab CONTINUE taking these medications Instructions Each Dose to Equal  
 Morning Noon Evening Bedtime  
 albuterol 90 mcg/actuation inhaler Commonly known as:  PROVENTIL HFA, VENTOLIN HFA, PROAIR HFA Notes to Patient:  Not taking in hospital  
   
 Take 2 Puffs by inhalation every four (4) hours as needed for Wheezing or Shortness of Breath. 2 Puff  
    
   
   
   
  
 atorvastatin 20 mg tablet Commonly known as:  LIPITOR Your last dose was: Today 3/26/18 at 8:30 am  
Your next dose is:  Tomorrow 3/27/18 Take 20 mg by mouth daily. 20 mg  
    
   
   
   
  
 benzonatate 200 mg capsule Commonly known as:  TESSALON Notes to Patient:  Not taking in hospital  
   
 TAKE ONE CAPSULE BY MOUTH THREE TIMES DAILY AS NEEDED FOR COUGH FOR  UP  TO  7  DAYS  Indications: Cough  
     
   
   
   
  
 calcium carbonate 600 mg calcium (1,500 mg) tablet Commonly known as:  Maria D Ring Notes to Patient:  Not taking in hospital  
   
 Take 600 mg by mouth daily. 600 mg CARTIA  mg ER capsule Generic drug:  dilTIAZem CD Your last dose was: Today 3/26/18 at 8:30 am  
Your next dose is:  Tomorrow 3/27/18 TAKE ONE CAPSULE BY MOUTH ONCE DAILY (PLEASE  SCHEDULE  APPOINTMENT  FOR  FURTHER  REFILLS) ergocalciferol 50,000 unit capsule Commonly known as:  ERGOCALCIFEROL Notes to Patient:  Not taking in hospital  
   
 Take 50,000 Units by mouth every seven (7) days. [address] Units  
    
   
   
   
  
 ferrous sulfate 325 mg (65 mg iron) tablet Your last dose was: Today 3/26/18 at 8:30 am  
Your next dose is:  Tomorrow 3/27/18 Take 1 Tab by mouth Daily (before breakfast). Indications: IRON DEFICIENCY ANEMIA  
 325 mg  
    
   
   
   
  
 fluticasone 50 mcg/actuation nasal spray Commonly known as:  Tyrell Rey Notes to Patient:  Not taking in hospital  
   
 2 sprays per nostril daily  
     
   
   
   
  
 fluticasone-salmeterol 100-50 mcg/dose diskus inhaler Commonly known as:  ADVAIR Your last dose was: Today you took the [facility] inhaler in place of the Advair. Your next dose is: Tonight 3/26/18 before bed Notes to Patient:  Not taking in hospital  
   
 Take 1 Puff by inhalation two (2) times a day. Indications: MAINTENANCE THERAPY FOR ASTHMA  
 1 Puff  
    
   
   
   
  
  
 ipratropium 42 mcg (0.06 %) nasal spray Commonly known as:  ATROVENT Notes to Patient:  Not taking in hospital  
   
 1 [place]mita by Both Nostrils route four (4) times daily. Indications: RHINORRHEA  
 1 Spray  
    
   
   
   
  
 montelukast 10 mg tablet Commonly known as:  SINGULAIR Your last dose was:  Yesterday 3/25/18 at 10 pm  
Your next dose is: Tonight 3/26/18 TAKE ONE TABLET BY MOUTH ONCE DAILY  
     
   
   
   
  
  
 VITAMIN C 500 mg tablet Generic drug:  ascorbic acid (vitamin C) Notes to Patient:  Not taking in hospital  
   
 Take 500 mg by mouth daily. 500 mg  
    
   
   
   
  
  
STOP taking these medications KEFLEX 500 mg capsule Generic drug:  cephALEXin  
   
  
 quinapril 40 mg tablet Commonly known as:  ACCUPRIL Where to Get Your Medications Information on where to get these meds will be given to you by the nurse or doctor. ! Ask your nurse or doctor about these medications  
  ondansetron hcl 8 mg tablet oxyCODONE-acetaminophen 5-325 mg per tablet  
 polyethylene glycol 17 gram packet Discharge Instructions HOSPITALIST DISCHARGE INSTRUCTIONS 
NAME: Seema Bustamante :  1960 MRN:  [id number] Date/Time:  3/26/2018 2:26 PM 
 
ADMIT DATE: 3/24/2018 DISCHARGE DATE: 3/26/2018 DIAGNOSIS:  ARF, Dehydration, Acute Encephalopathy, S/p Ventral Hernia Repair, Constipation, DM, ROQUE, HTN, Morbid Obesity. MEDICATIONS: 
  
 
         As per medication reconciliation · It is important that you take the medication exactly as they are prescribed. · Keep your medication in the bottles provided by the pharmacist and keep a list of the medication names, dosages, and times to be taken in your wallet. · Do not take other medications without consulting your doctor. Pain Management: per above medications What to do at [facility] Recommended diet:  Cardiac Diet and Diabetic Diet Recommended activity: Activity as tolerated and No driving while on analgesics Keep Drains in place until see Surgeon from [facility], has appointment on Friday If you experience any of the following symptoms then please call your primary care physician or return to the emergency room if you cannot get hold of your doctor: 
Fever, chills, nausea, vomiting, diarrhea, change in mentation, falling, bleeding, shortness of breath. Follow Up: 
 PCP you are to call and set up an appointment to see them in 1 week. F/U Surgery in [address] Information obtained by : 
I understand that if any problems occur once I am at home I am to contact my physician. I understand and acknowledge receipt of the instructions indicated above. Physician's or R.N.'s Signature                                                                  Date/Time Patient or Representative Signature                                                          Date/Time DISCHARGE SUMMARY from Nurse The following personal items are in your possession at time of discharge: 
 
Dental Appliances: None Visual Aid: None Home Medications: None Jewelry: None Clothing: None (left in in pt. room) Other Valuables: None PATIENT INSTRUCTIONS: 
 
While taking prescription Narcotics: · Limit your activities · Do not drive and operate hazardous machinery · Do not make important personal or business decisions · Do  not drink alcoholic beverages Report the following to your surgeon: 
· Excessive pain, swelling, redness or odor of or around the surgical area · Temperature over 100.5 · Nausea and vomiting lasting longer than 4 hours or if unable to take medications · Any signs of decreased circulation or nerve impairment to extremity: change in color, persistent  numbness, tingling, coldness or increase pain · Any questions To prevent infection remember to use good handwashing. What to do at Home: 
Recommended activity: Activity as tolerated, No lifting, Driving, or Strenuous exercise for at least 2 weeks, until cleared by your surgeon and No driving for 2 weeks or until cleared by your surgeon. If you have surgical incisions you may shower. Please do not scrub incision, let water flow over area and pat dry. Do not tub bathe, get in a hot tub or swim; until your wounds have healed and given the OK by your surgeon to do so. *  Please give a list of your current medications to your Primary Care Provider. *  Please update this list whenever your medications are discontinued, doses are 
    changed, or new medications (including over-the-counter products) are added. *  Please carry medication information at all times in case of emergency situations. These are general instructions for a healthy lifestyle: No smoking/ No tobacco products/ Avoid exposure to second hand smoke Surgeon General's Warning:  Quitting smoking now greatly reduces serious risk to your health. Obesity, smoking, and sedentary lifestyle greatly increases your risk for illness A healthy diet, regular physical exercise & weight monitoring are important for maintaining a healthy lifestyle You may be retaining fluid if you have a history of heart failure or if you experience any of the following symptoms:  Weight gain of 3 pounds or more overnight or 5 pounds in a week, increased swelling in our hands or feet or shortness of breath while lying flat in bed. Please call your doctor as soon as you notice any of these symptoms; do not wait until your next office visit. Recognize signs and symptoms of STROKE: 
 
F-face looks uneven A-arms unable to move or move unevenly S-speech slurred or non-existent T-time-call 911 as soon as signs and symptoms begin-DO NOT go Back to bed or wait to see if you get better-TIME IS BRAIN. The discharge information has been reviewed with the patient. The patient verbalized understanding. Nor1 Announcement We are excited to announce that we are making your provider's discharge notes available to you in Nor1. You will see these notes when they are completed and signed by the physician that discharged you from your recent hospital stay. If you have any questions or concerns about any information you see in Nor1, please call the Health Information Department where you were seen or reach out to your Primary Care Provider for more information about your plan of care. Introducing [facility]!    
 Rodrick Pimentel introduces Nor1 patient portal. Now you can access parts of your medical record, email your doctor's office, and request medication refills online. 1. In your internet browser, go to [email or web address] 2. Click on the First Time User? Click Here link in the Sign In box. You will see the New Member Sign Up page. 3. Enter your [employer] Access Code exactly as it appears below. You will not need to use this code after youve completed the sign-up process. If you do not sign up before the expiration date, you must request a new code. · Insight[employer] Access Code: [email or web address] Expires: 5/5/2018 10:48 PM 
 
4. Enter the last four digits of your Social Security Number (xxxx) and Date of Birth (mm/dd/yyyy) as indicated and click Submit. You will be taken to the next sign-up page. 5. Create a Insightpool ID. This will be your Insightpool login ID and cannot be changed, so think of one that is secure and easy to remember. 6. Create a Insightpool password. You can change your password at any time. 7. Enter your Password Reset Question and Answer. This can be used at a later time if you forget your password. 8. Enter your e-mail address. You will receive e-mail notification when new information is available in [address] 9. Click Sign Up. You can now view and download portions of your medical record. 10. Click the Download Summary menu link to download a portable copy of your medical information. If you have questions, please visit the Frequently Asked Questions section of the Insight[employer] website. Remember, Insightpool is NOT to be used for urgent needs. For medical emergencies, dial 911. Now available from your iPhone and Android! Providers Seen During Your Hospitalization Provider Specialty Primary office phone Brook Rebollar MD Internal Medicine [phone] Rosalina Dinero MD Hospitalist [phone] Your Primary Care Physician (PCP) Primary Care Physician Office Phone Office Fax Rodrigo Phillips [phone] You are allergic to the following No active allergies Recent Documentation Height Weight BMI OB Status Smoking Status 1.651 m 146 kg 53.55 kg/m2 Postmenopausal Former Smoker Emergency Contacts Name Discharge Info Relation Home Work Mobile Bree FLETCHER CAREGIVER [4] Child [2] [phone] Meg Angulo CAREGIVER [3] Sister [23] [phone] Patient Belongings The following personal items are in your possession at time of discharge: 
  Dental Appliances: None  Visual Aid: Glasses (reading only)      Home Medications: None   Jewelry: None  Clothing: Jacket/Coat    Other Valuables: None Please provide this summary of care documentation to your next provider. Signatures-by signing, you are acknowledging that this After Visit Summary has been reviewed with you and you have received a copy. Patient Signature:  ____________________________________________________________ Date:  ____________________________________________________________  
  
[facility] Provider Signature:  ____________________________________________________________ Date:  ____________________________________________________________

## 2018-03-24 NOTE — PROGRESS NOTES
Received into room 2121, awake, alert and oriented. Family at bedside. Pt found to have multiple staple incisions to abdomen all open to air, no redness on any. Bilateral ELLIOTT drain noted to abdomen. Broderick intact draining clear yellow urine. No abdominal binder in place. IS at bedside,. Pt encouraged to use. Pt aware she is NPO except ice chips tonight.

## 2018-03-24 NOTE — PROGRESS NOTES
CCU nurses came to  patient to do test in CCU then pt to go room 2121. Called Evaristo Sandoval to let her know patient has left the unit. Family members taking patient belongings down to 2121. Notified that I spoke with lab and they are able to do urine tests just ordered as add ons to urine just sent this afternoon. Did let her know that another chemistry lab has been just ordered that would need to be done.

## 2018-03-24 NOTE — PROGRESS NOTES
Transferred via wheelchair to room [phone], assisted from chair to bed and # 14 [place] nicolas inserted by ROBIN Savage RN. Urine obtained, Creatinine of urine for lab obtained. Bladder pressure obtained for TONI Duncan. To transfer to Surgical room via wheelchair after drawing lab.

## 2018-03-24 NOTE — PROGRESS NOTES
Hospitalist Progress Note    NAME: Irma Ortiz   :  1960   MRN:  [id number]       Assessment / Plan:  Acute renal failure/Dehydration/Pre-renal: will c/w IVF, US shows no obstruction,  Nephrology consult requested. Acute encephalopathy due to over medication (percocet) so far resolved, monitor  Recent ventral hernia repair at [facility]: needs F/U with Surgery, check KUB, has a drain. Constipation: check KUB. Suspect ROQUE/OHS: monitor. Diabetes Mellitus 2 Uncontrolled, with hyperglycemia: c/w SSI, check HBA1C.  hypermorbid obesity, NOS patient weighs 320# - encouraged weight loss, BMI 55  Hypertension, Benign essential : c/w Cardizem, use hydralazine prn  Code status: Full  Message left for Michael Chavarria  Prophylaxis: SCD's  Recommended Disposition:  PT, OT, RN     Subjective:     Chief Complaint / Reason for Physician Visit  \"I feel a little better\". Discussed with RN events overnight. Review of Systems:  Symptom Y/N Comments  Symptom Y/N Comments   Fever/Chills    Chest Pain     Poor Appetite    Edema     Cough    Abdominal Pain     Sputum    Joint Pain     SOB/GALLARDO    Pruritis/Rash     Nausea/vomit    Tolerating PT/OT     Diarrhea    Tolerating Diet y    Constipation    Other       Could NOT obtain due to:      Objective:     VITALS:   Last 24hrs VS reviewed since prior progress note. Most recent are:  Patient Vitals for the past 24 hrs:   Temp Pulse Resp BP SpO2   18 1118 98.1 °F (36.7 °C) 72 18 146/88 95 %   18 0730 97.6 °F (36.4 °C) 72 18 139/60 93 %   18 0359 97.9 °F (36.6 °C) 76 10 114/60 90 %       Intake/Output Summary (Last 24 hours) at 18 1141  Last data filed at 18 0630   Gross per 24 hour   Intake              500 ml   Output              300 ml   Net              200 ml        PHYSICAL EXAM:  General: WD, WN. Alert, cooperative, no acute distress    EENT:  EOMI. Anicteric sclerae. MMM  Resp:  CTA bilaterally, no wheezing or rales.   No accessory muscle use  CV:  Regular  rhythm,  No edema  GI:  Soft, Non distended, +  tender.  +Bowel sounds, drain in place  Neurologic:  Alert and oriented X 3, normal speech,   Psych:   Good insight. Not anxious nor agitated  Skin:  No rashes. No jaundice    Reviewed most current lab test results and cultures  YES  Reviewed most current radiology test results   YES  Review and summation of old records today    NO  Reviewed patient's current orders and MAR    YES  PMH/SH reviewed - no change compared to H&P  ________________________________________________________________________  Care Plan discussed with:    Comments   Patient y    Family  y    RN y    Care Manager     Consultant                        Multidiciplinary team rounds were held today with , nursing, pharmacist and clinical coordinator. Patient's plan of care was discussed; medications were reviewed and discharge planning was addressed. ________________________________________________________________________  Total NON critical care TIME:  35   Minutes    Total CRITICAL CARE TIME Spent:   Minutes non procedure based      Comments   >50% of visit spent in counseling and coordination of care y    ________________________________________________________________________  Mary Ellen Orellana MD     Procedures: see electronic medical records for all procedures/Xrays and details which were not copied into this note but were reviewed prior to creation of Plan. LABS:  I reviewed today's most current labs and imaging studies.   Pertinent labs include:  Recent Labs      03/24/18   0553  03/23/18   2030   WBC  15.3*  19.7*   HGB  9.5*  10.1*   HCT  31.7*  33.6*   PLT  436*  458*     Recent Labs      03/24/18   0553  03/23/18 2226  03/23/18   2030   NA  134*  138  137   K  4.2  4.0  3.8   CL  99  98  96*   CO2  25  29  26   GLU  107*  124*  132*   BUN  61*  56*  54*   CREA  3.66*  3.27*  3.18*   CA  8.1*  8.2*  8.2*   MG  2.9*   --    --    ALB --    --   2.9*   TBILI   --    --   0.5   SGOT   --    --   62*   ALT   --    --   41       Signed: Jeremie Cagle MD

## 2018-03-24 NOTE — PROGRESS NOTES
Primary Nurse Jasmyne Gonzalez RN and [facility], RN performed a dual skin assessment on this patient Impairment noted- see wound doc flow sheet  Tremaine score is 18  Abdominal staples noted mid abdomen and 4 more single staples noted to abdomen. Bilateral ELLIOTT drains noted to lower abdomen. Sacrum intact,  Heels intact.

## 2018-03-24 NOTE — CONSULTS
[facility]   [address] [facility], [address] [place] [address], [facility]  Phone: [phone] NOTE     Patient: Melissa Rowley MRN: [id number]  PCP: JENNY Beyer   :     1960  Age:   61 y.o. Sex:  female      Referring physician: Martin Virk MD  Reason for consultation: 61 y.o. female with Acute Renal Failure  Acute renal failure ([facility] [address] complicated by JESSE   Admission Date: 3/24/2018  3:45 AM  LOS: 0 days      ASSESSMENT and PLAN :   JESSE, multifactorial - volume depletion, NSAIDs, 3rd spacing, perhaps mild rhabdo given mildly elevated CK level; possibly outpatient ACEi as well         -renal US without hydronephrosis    S/P abdominal ventral hernia repair 3/21/18 at OSH    DM    HTN    Super morbid obesity      REC:  Change IVF to isotoic bicarb drip and increase rate  Urine sodium and creat  Serial labs    Thank you. Will follow closely. Subjective:   HPI: Melissa Rowley is a 61 y.o.  female who has been admitted to the hospital for JESSE and encephalopathy. She underwent an abdominal hernia repair 3/21/18 and has since had poor intake and ongoing pain for which she had been taking 400 mg Motrin at least twice per day. Also given Toradol in ED at OSH. She has normal renal function at baseline, and was found to have creat>3.5 - see labs below. No diarrhea, some nausea, poor intake as above. Past Medical Hx:   Past Medical History:   Diagnosis Date    Anemia     Diabetes mellitus type II     Hypertension     Menopause         Past Surgical Hx:     Past Surgical History:   Procedure Laterality Date    HX GI  2006     bowel surgery for infection s/p colostomy and then removal     HX PELVIC LAPAROSCOPY           No Known Allergies    Social Hx:  reports that she has quit smoking. She has never used smokeless tobacco. She reports that she does not drink alcohol or use illicit drugs.      Family History   Problem Relation Age of Onset    Diabetes Mother     Breast Cancer Mother     Hypertension Sister     Breast Cancer Sister     Diabetes Sister        Review of Systems:  A twelve point review of system was performed today. Pertinent positives and negatives are mentioned in the HPI. The reminder of the ROS is negative and noncontributory. Objective:    Vitals:    Vitals:    03/24/18 0359 03/24/18 0730 03/24/18 1118   BP: 114/60 139/60 146/88   Pulse: 76 72 72   Resp: 10 18 18   Temp: 97.9 °F (36.6 °C) 97.6 °F (36.4 °C) 98.1 °F (36.7 °C)   SpO2: 90% 93% 95%   Weight: 149.8 kg (330 lb 3.2 oz)     Height: 5' 5\" (1.651 m)       I&O's:  03/23 0701 - 03/24 0700  In: 500 [I.V.:500]  Out: 300 [Urine:300]  Visit Vitals    /88    Pulse 72    Temp 98.1 °F (36.7 °C)    Resp 18    Ht 5' 5\" (1.651 m)    Wt 149.8 kg (330 lb 3.2 oz)    SpO2 95%    BMI 54.95 kg/m2       Physical Exam:  General:Alert, No distress,   HEENT: moist mucous membranes. Conjunctiva without pallor ,erythema. The sclerae without icterus. .   Neck:Supple,no mass palpable; no JVD  Lungs : Clears to auscultation Bilaterally, Normal respiratory effort  CVS: RRR, S1 S2 normal, No rub, 1+LE edema  Abdomen: Soft, post op with drins in place  Extremities: No cyanosis, No clubbing  Skin: No rash or lesions.   MS: No joint swelling, erythema, warmth  Neurologic: non focal, AAO x 3  Psych: normal affect    Laboratory Results:    Recent Labs      03/24/18   0553  03/23/18   2226  03/23/18   2030   NA  134*  138  137   K  4.2  4.0  3.8   CL  99  98  96*   CO2  25  29  26   GLU  107*  124*  132*   BUN  61*  56*  54*   CREA  3.66*  3.27*  3.18*   CA  8.1*  8.2*  8.2*   MG  2.9*   --    --    ALB   --    --   2.9*   SGOT   --    --   62*   ALT   --    --   41     Recent Labs      03/24/18   0553  03/23/18   2030   WBC  15.3*  19.7*   HGB  9.5*  10.1*   HCT  31.7*  33.6*   PLT  436*  458*     No results found for: SDES  No results found for: CULT  Recent Results (from the past 24 hour(s))   CBC WITH AUTOMATED DIFF    Collection Time: 03/23/18  8:30 PM   Result Value Ref Range    WBC 19.7 (H) 3.6 - 11.0 K/uL    RBC 4.59 3.80 - 5.20 M/uL    HGB 10.1 (L) 11.5 - 16.0 g/dL    HCT 33.6 (L) 35.0 - 47.0 %    MCV 73.2 (L) 80.0 - 99.0 FL    MCH 22.0 (L) 26.0 - 34.0 PG    MCHC 30.1 30.0 - 36.5 g/dL    RDW 18.6 (H) 11.5 - 14.5 %    PLATELET 606 (H) 040 - 400 K/uL    MPV 10.2 8.9 - 12.9 FL    NRBC 0.2 (H) 0  WBC    ABSOLUTE NRBC 0.04 (H) 0.00 - 0.01 K/uL    NEUTROPHILS 87 (H) 32 - 75 %    LYMPHOCYTES 9 (L) 12 - 49 %    MONOCYTES 3 (L) 5 - 13 %    EOSINOPHILS 0 0 - 7 %    BASOPHILS 0 0 - 1 %    IMMATURE GRANULOCYTES 1 (H) 0.0 - 0.5 %    ABS. NEUTROPHILS 17.2 (H) 1.8 - 8.0 K/UL    ABS. LYMPHOCYTES 1.8 0.8 - 3.5 K/UL    ABS. MONOCYTES 0.7 0.0 - 1.0 K/UL    ABS. EOSINOPHILS 0.0 0.0 - 0.4 K/UL    ABS. BASOPHILS 0.0 0.0 - 0.1 K/UL    ABS. IMM. GRANS. 0.1 (H) 0.00 - 0.04 K/UL    DF AUTOMATED     METABOLIC PANEL, COMPREHENSIVE    Collection Time: 03/23/18  8:30 PM   Result Value Ref Range    Sodium 137 136 - 145 mmol/L    Potassium 3.8 3.5 - 5.1 mmol/L    Chloride 96 (L) 97 - 108 mmol/L    CO2 26 21 - 32 mmol/L    Anion gap 15 5 - 15 mmol/L    Glucose 132 (H) 65 - 100 mg/dL    BUN 54 (H) 7.0 - 18.0 MG/DL    Creatinine 3.18 (H) 0.55 - 1.02 MG/DL    BUN/Creatinine ratio 17 12 - 20      GFR est AA 18 (L) >60 ml/min/1.73m2    GFR est non-AA 15 (L) >60 ml/min/1.73m2    Calcium 8.2 (L) 8.5 - 10.1 MG/DL    Bilirubin, total 0.5 0.2 - 1.0 MG/DL    ALT (SGPT) 41 14 - 63 U/L    AST (SGOT) 62 (H) 15 - 37 U/L    Alk.  phosphatase 88 45 - 117 U/L    Protein, total 7.6 6.4 - 8.2 g/dL    Albumin 2.9 (L) 3.5 - 5.0 g/dL    Globulin 4.7 (H) 2.0 - 4.0 g/dL    A-G Ratio 0.6 (L) 1.1 - 2.2     LIPASE    Collection Time: 03/23/18  8:30 PM   Result Value Ref Range    Lipase 87 73 - 393 U/L   URINALYSIS W/ RFLX MICROSCOPIC    Collection Time: 03/23/18  8:30 PM   Result Value Ref Range    Color YELLOW Appearance CLEAR CLEAR      Specific gravity 1.030 1.003 - 1.030      pH (UA) 5.0 5.0 - 8.0      Protein 30 (A) NEG mg/dL    Glucose NEGATIVE  NEG mg/dL    Ketone NEGATIVE  NEG mg/dL    Blood MODERATE (A) NEG      Urobilinogen 0.2 0.2 - 1.0 EU/dL    Nitrites NEGATIVE  NEG      Leukocyte Esterase NEGATIVE  NEG     BILIRUBIN, CONFIRM    Collection Time: 03/23/18  8:30 PM   Result Value Ref Range    Bilirubin UA, confirm NEGATIVE  NEG     URINE MICROSCOPIC ONLY    Collection Time: 03/23/18  8:30 PM   Result Value Ref Range    WBC 0-4 0 - 4 /hpf    RBC 10-20 0 - 5 /hpf    Epithelial cells FEW FEW /lpf    Bacteria NEGATIVE  NEG /hpf   CK W/ CKMB & INDEX    Collection Time: 03/23/18 10:15 PM   Result Value Ref Range    CK 2093 (H) 26 - 192 U/L    CK - MB 11.9 (H) <3.6 NG/ML    CK-MB Index 0.6 0 - 2.5     METABOLIC PANEL, BASIC    Collection Time: 03/23/18 10:26 PM   Result Value Ref Range    Sodium 138 136 - 145 mmol/L    Potassium 4.0 3.5 - 5.1 mmol/L    Chloride 98 97 - 108 mmol/L    CO2 29 21 - 32 mmol/L    Anion gap 11 5 - 15 mmol/L    Glucose 124 (H) 65 - 100 mg/dL    BUN 56 (H) 7.0 - 18.0 MG/DL    Creatinine 3.27 (H) 0.55 - 1.02 MG/DL    BUN/Creatinine ratio 17 12 - 20      GFR est AA 18 (L) >60 ml/min/1.73m2    GFR est non-AA 15 (L) >60 ml/min/1.73m2    Calcium 8.2 (L) 8.5 - 10.1 MG/DL   LACTIC ACID    Collection Time: 03/23/18 10:26 PM   Result Value Ref Range    Lactic acid 1.8 0.4 - 2.0 MMOL/L   TROPONIN I    Collection Time: 03/23/18 10:28 PM   Result Value Ref Range    Troponin-I, Qt. 0.06 <0.08 ng/mL   BNP    Collection Time: 03/23/18 10:29 PM   Result Value Ref Range    BNP 58 0 - 100 pg/mL   POC G3 - PUL    Collection Time: 03/23/18 11:32 PM   Result Value Ref Range    FIO2 (POC) 28 %    pH (POC) 7.349 (L) 7.35 - 7.45      pCO2 (POC) 44.4 35.0 - 45.0 MMHG    pO2 (POC) 75 (L) 80 - 100 MMHG    HCO3 (POC) 24.4 22 - 26 MMOL/L    sO2 (POC) 94 92 - 97 %    Base deficit (POC) 1 mmol/L    Site LEFT RADIAL Device: NASAL CANNULA      Flow rate (POC) 2 L/M    Allens test (POC) YES      Specimen type (POC) ARTERIAL      Total resp. rate 13     CBC WITH AUTOMATED DIFF    Collection Time: 03/24/18  5:53 AM   Result Value Ref Range    WBC 15.3 (H) 3.6 - 11.0 K/uL    RBC 4.27 3.80 - 5.20 M/uL    HGB 9.5 (L) 11.5 - 16.0 g/dL    HCT 31.7 (L) 35.0 - 47.0 %    MCV 74.2 (L) 80.0 - 99.0 FL    MCH 22.2 (L) 26.0 - 34.0 PG    MCHC 30.0 30.0 - 36.5 g/dL    RDW 18.7 (H) 11.5 - 14.5 %    PLATELET 338 (H) 667 - 400 K/uL    MPV 10.4 8.9 - 12.9 FL    NRBC 0.2 (H) 0  WBC    ABSOLUTE NRBC 0.03 (H) 0.00 - 0.01 K/uL    NEUTROPHILS 84 (H) 32 - 75 %    LYMPHOCYTES 11 (L) 12 - 49 %    MONOCYTES 4 (L) 5 - 13 %    EOSINOPHILS 0 0 - 7 %    BASOPHILS 0 0 - 1 %    IMMATURE GRANULOCYTES 1 (H) 0.0 - 0.5 %    ABS. NEUTROPHILS 12.9 (H) 1.8 - 8.0 K/UL    ABS. LYMPHOCYTES 1.7 0.8 - 3.5 K/UL    ABS. MONOCYTES 0.7 0.0 - 1.0 K/UL    ABS. EOSINOPHILS 0.0 0.0 - 0.4 K/UL    ABS. BASOPHILS 0.0 0.0 - 0.1 K/UL    ABS. IMM.  GRANS. 0.1 (H) 0.00 - 0.04 K/UL    DF AUTOMATED     METABOLIC PANEL, BASIC    Collection Time: 03/24/18  5:53 AM   Result Value Ref Range    Sodium 134 (L) 136 - 145 mmol/L    Potassium 4.2 3.5 - 5.1 mmol/L    Chloride 99 97 - 108 mmol/L    CO2 25 21 - 32 mmol/L    Anion gap 10 5 - 15 mmol/L    Glucose 107 (H) 65 - 100 mg/dL    BUN 61 (H) 6 - 20 MG/DL    Creatinine 3.66 (H) 0.55 - 1.02 MG/DL    BUN/Creatinine ratio 17 12 - 20      GFR est AA 15 (L) >60 ml/min/1.73m2    GFR est non-AA 13 (L) >60 ml/min/1.73m2    Calcium 8.1 (L) 8.5 - 10.1 MG/DL   MAGNESIUM    Collection Time: 03/24/18  5:53 AM   Result Value Ref Range    Magnesium 2.9 (H) 1.6 - 2.4 mg/dL   GLUCOSE, POC    Collection Time: 03/24/18  7:02 AM   Result Value Ref Range    Glucose (POC) 120 (H) 65 - 100 mg/dL    Performed by Misty Espinoza    GLUCOSE, POC    Collection Time: 03/24/18 11:06 AM   Result Value Ref Range    Glucose (POC) 140 (H) 65 - 100 mg/dL    Performed by Barry Pelaez (PCT)            Urine dipstick:   Lab Results   Component Value Date/Time    Color YELLOW 03/23/2018 08:30 PM    Appearance CLEAR 03/23/2018 08:30 PM    Specific gravity 1.030 03/23/2018 08:30 PM    pH (UA) 5.0 03/23/2018 08:30 PM    Protein 30 (A) 03/23/2018 08:30 PM    Glucose NEGATIVE  03/23/2018 08:30 PM    Ketone NEGATIVE  03/23/2018 08:30 PM    Urobilinogen 0.2 03/23/2018 08:30 PM    Nitrites NEGATIVE  03/23/2018 08:30 PM    Leukocyte Esterase NEGATIVE  03/23/2018 08:30 PM    Epithelial cells FEW 03/23/2018 08:30 PM    Bacteria NEGATIVE  03/23/2018 08:30 PM    WBC 0-4 03/23/2018 08:30 PM    RBC 10-20 03/23/2018 08:30 PM       I have reviewed the following: All pertinent labs, microbiology data, radiology imaging for my assessment     Xray/US REV: Yes    Care Plan discussed with:  Patient and family  Chart reviewed. Medications list Personally Reviewed   [x]      Yes     []               No       Medications:  Prior to Admission medications    Medication Sig Start Date End Date Taking? Authorizing Provider   oxyCODONE-acetaminophen (PERCOCET) 5-325 mg per tablet Take 2 Tabs by mouth every four (4) hours as needed for Pain. Lorena Lau MD   atorvastatin (LIPITOR) 20 mg tablet Take 20 mg by mouth daily. Lorena Lau MD   calcium carbonate (CALTREX) 600 mg calcium (1,500 mg) tablet Take 600 mg by mouth daily. Lorena Lau MD   cephALEXin (KEFLEX) 500 mg capsule Take 500 mg by mouth four (4) times daily. Lorena Lau MD   hydroCHLOROthiazide (HYDRODIURIL) 50 mg tablet Take 25 mg by mouth daily. Lorena Lau MD   ascorbic acid, vitamin C, (VITAMIN C) 500 mg tablet Take 500 mg by mouth daily. Lorena Lau MD   ergocalciferol (ERGOCALCIFEROL) 50,000 unit capsule Take 50,000 Units by mouth every seven (7) days.     Lorena Lau MD   montelukast (SINGULAIR) 10 mg tablet TAKE ONE TABLET BY MOUTH ONCE DAILY 3/14/18   Mary Otero MD   metFORMIN (GLUCOPHAGE) 500 mg tablet TAKE ONE TABLET BY MOUTH TWICE DAILY WITH  MEALS 2/28/18   Samuel Tobin MD   benzonatate (TESSALON) 200 mg capsule TAKE ONE CAPSULE BY MOUTH THREE TIMES DAILY AS NEEDED FOR COUGH FOR  UP  TO  7  DAYS  Indications: Cough 1/10/18   Samuel Tobin MD   CARTIA  mg ER capsule TAKE ONE CAPSULE BY MOUTH ONCE DAILY (PLEASE  SCHEDULE  APPOINTMENT  FOR  FURTHER  REFILLS) 1/3/18   Samuel Tobin MD   quinapril (ACCUPRIL) 40 mg tablet Take 1 Tab by mouth nightly. Indications: hypertension 12/5/17   Samuel Tobin MD   ipratropium (ATROVENT) 0.06 % nasal spray 1 [place] by Both Nostrils route four (4) times daily. Indications: RHINORRHEA 7/12/17   Samuel Tobin MD   fluticasone-salmeterol (ADVAIR) 100-50 mcg/dose diskus inhaler Take 1 Puff by inhalation two (2) times a day. Indications: MAINTENANCE THERAPY FOR ASTHMA 3/3/17   Geraline Ratel, NP   fluticasone [facility]) 50 mcg/actuation nasal spray 2 sprays per nostril daily 11/25/16   Geraline Ratel, NP   metFORMIN (GLUCOPHAGE) 500 mg tablet Take 1 Tab by mouth two (2) times daily (with meals). 10/3/16   Nelly Morgan MD   ferrous sulfate 325 mg (65 mg iron) tablet Take 1 Tab by mouth Daily (before breakfast). Indications: IRON DEFICIENCY ANEMIA 8/12/16   JENNY Magallon   albuterol (PROVENTIL HFA, VENTOLIN HFA, PROAIR HFA) 90 mcg/actuation inhaler Take 2 Puffs by inhalation every four (4) hours as needed for Wheezing or Shortness of Breath. 7/8/16   JENNY Magallon        Thank you for allowing us to participate in the care of this patient. We will follow patient. Please dont hesitate to call with any questions    Anna Marie Greenberg MD  [facility]   [address] [facility] [address]  Phone - [phone]   Fax - [phone]  www. [place] [facility]

## 2018-03-24 NOTE — CONSULTS
Surgery Consult    Subjective:      Ricardo Valero is a 61 y.o. female transferred to the medical service from [facility] for acute kidney injury. She is 3 days s/p massive ventral hernia repair with mesh. No records are available from the prior hospitalization, but from aggregate history from the patient and her family, she apparently had planned laparoscopic ventral hernia repair, which was converted to open hernia repair with a large mesh repair. It is unclear whether this was intraabdominal, preperitoneal or onlay mesh repair. It is unclear whether there was a component separation done. Further, she apparently had postop respiratory failure and was reintubated and transferred to the ICU overnight, but then extubated and discharged home at noon on postop day #1. The patient reports that she had a massive bulging hernia preop. She states that she has similar symptoms now as prior to surgery. She had some nausea but has not vomited. Last BM was the day prior to surgery but she has been passing flatus. It is unclear whether there was a CT done at [facility]. It is unclear what the patient's creatinine was preop. Last historical Cr in our system was 0.63 in Nov 2017. Pt's Cr on arrival here was 3.18 and is 3.66 today. Surgery is consulted at this time on a non-emergent basis to look at the wound and drains.     Past Medical History:   Diagnosis Date    Anemia     Diabetes mellitus type II     Hypertension     Menopause      Past Surgical History:   Procedure Laterality Date    HX GI  2006     bowel surgery for infection s/p colostomy and then removal     HX PELVIC LAPAROSCOPY        Family History   Problem Relation Age of Onset    Diabetes Mother     Breast Cancer Mother     Hypertension Sister     Breast Cancer Sister     Diabetes Sister      Social History     Social History    Marital status:      Spouse name: N/A    Number of children: N/A    Years of education: N/A     Social History Main Topics    Smoking status: Former Smoker    Smokeless tobacco: Never Used    Alcohol use No    Drug use: No    Sexual activity: Not on file     Other Topics Concern    Not on file     Social History Narrative      Current Facility-Administered Medications   Medication Dose Route Frequency Provider Last Rate Last Dose    atorvastatin (LIPITOR) tablet 20 mg  20 mg Oral DAILY Yazmin Starks MD   20 mg at 03/24/18 1013    dilTIAZem CD (CARDIZEM CD) capsule 240 mg  240 mg Oral DAILY Yazmin Starks MD   240 mg at 03/24/18 1013    ferrous sulfate tablet 325 mg  325 mg Oral ACB Yazmin Starks MD   325 mg at 03/24/18 1013    fluticasone-vilanterol (BREO ELLIPTA) 100mcg-25mcg/puff  1 Puff Inhalation DAILY Yazmin Starks MD   1 Puff at 03/24/18 1012    montelukast (SINGULAIR) tablet 10 mg  10 mg Oral QHS Yazmin Starks MD   Stopped at 03/24/18 0500    acetaminophen (TYLENOL) tablet 650 mg  650 mg Oral Q4H PRN Yazmin Starks MD        ondansetron [facility]) injection 4 mg  4 mg IntraVENous Q4H PRN Yazmin Starks MD        hydrALAZINE (APRESOLINE) 20 mg/mL injection 10 mg  10 mg IntraVENous Q2H PRN Yazmin Starks MD        polyethylene glycol (MIRALAX) packet 17 g  17 g Oral DAILY Yazmin Starks MD   17 g at 03/24/18 1013    albuterol-ipratropium (DUO-NEB) 2.5 MG-0.5 MG/3 ML  3 mL Nebulization Q2H PRN Yazmin Starks MD        insulin lispro (HUMALOG) injection   SubCUTAneous AC&HS Yazmin Starks MD   Stopped at 03/24/18 0730    glucose chewable tablet 16 g  4 Tab Oral PRN Yazmin Starks MD        dextrose (D50W) injection syrg 12.5-25 g  12.5-25 g IntraVENous PRN Yazmin Starks MD        glucagon (GLUCAGEN) injection 1 mg  1 mg IntraMUSCular PRN Yazmin Starks MD        sodium chloride (NS) flush 5-10 mL  5-10 mL IntraVENous Q8H Yazmin Starks MD   10 mL at 03/24/18 0547    sodium chloride (NS) flush 5-10 mL  5-10 mL IntraVENous PRN Yazmin Starks MD        morphine 2 mg 2 mg IntraVENous Q4H PRN Jose Aly MD        0.45% sodium chloride 1,000 mL with sodium bicarbonate (8.4%) 75 mEq infusion   IntraVENous CONTINUOUS Darian Vences MD            No Known Allergies    Review of Systems:  Pertinent items are noted in the History of Present Illness. Objective:      Patient Vitals for the past 8 hrs:   BP Temp Pulse Resp SpO2   18 1535 157/66 98 °F (36.7 °C) 73 18 90 %   18 1118 146/88 98.1 °F (36.7 °C) 72 18 95 %       Temp (24hrs), Av.8 °F (36.6 °C), Min:97.6 °F (36.4 °C), Max:98.1 °F (36.7 °C)      Physical Exam:  GENERAL: alert, cooperative, mild distress, appears stated age, morbidly obese, LUNG: clear to auscultation bilaterally, HEART: regular rate and rhythm, ABDOMEN: obese, appropriately tender. Not able to comment on whether she is distended. She is moderately tense feeling. Midline long incision, left lower transverse incision and several trocar incisions all c/d/i.  ELLIOTT drain x 2 with serosanguinous output. Bowel sounds hypoactive. EXTREMITIES:  extremities normal, atraumatic, no cyanosis or edema    Assessment:     Hospital Problems  Date Reviewed: 3/24/2018          Codes Class Noted POA    Acute renal failure [facility]) ICD-10-CM: N17.9  ICD-9-CM: 584.9  3/24/2018 Unknown            Very worrisome acute kidney injury/renal failure following elective surgical procedure. Absolutely no independently verifiable details available. Transferred without any clear documentation of a discussion with the patient's surgeon (Dr. Bubba Salinas) and no discussion with surgery here until this afternoon. This is not a normal or expected postop complication from ventral hernia repair. There may be a component of abdominal compartment syndrome at play. Abdominal plain film just completed shows some colonic ileus, no small bowel distention and no obvious free air. Clinically pt actually looks pretty good, excluding her renal function.       Plan:     Emergent (temporary) transfer to ICU to transduce bladder pressure (no mechanism to accomplish this on the neuro or other standard acuity floors). If bladder pressure is not in the abdominal compartment syndrome range, pt can return to a nursing bed, although she should ideally be managed on the surgical rather than the neuro floor. If bladder pressure is elevated, we will need to keep in icu with ongoing monitoring, likely return to surgery to release her abdominal compartment. Nephrology consult placed.     Signed By: Tommy Dacosta MD, [facility]     March 24, 2018

## 2018-03-24 NOTE — PROGRESS NOTES
General Surgery End of Shift Nursing Note    Bedside shift change report given to Debra Romero RN (oncoming nurse) by Wendy Browning RN (offgoing nurse). Report included the following information SBAR, Kardex, ED Summary, Procedure Summary, Intake/Output, MAR and Recent Results. Shift worked:   7A to [employer] of shift:    Received from Neuro at [facility]. S/P Ventral hernia repair with mesh at [facility] 3 days ago. Staple lines intact to abd/ ELLIOTT drains x 2. Issues for physician to address:        Number times ambulated in hallway past shift: 0    Number of times OOB to chair past shift: 0    Pain Management:  Current medication:   Patient states pain is manageable on current pain medication: YES    GI:    Current diet:  DIET DIABETIC CONSISTENT CARB Regular; 2 GM NA (House Low NA); No Conc. Sweets  DIET NPO    Tolerating current diet: YES  Passing flatus: YES  Last Bowel Movement: several days ago   Appearance:     Respiratory:    Incentive Spirometer at bedside: YES  Patient instructed on use: YES    Patient Safety:    Falls Score: 2  Bed Alarm On? No  Sitter?  No    Wendy Browning RN

## 2018-03-25 ENCOUNTER — APPOINTMENT (OUTPATIENT)
Dept: GENERAL RADIOLOGY | Age: 58
DRG: 682 | End: 2018-03-25
Attending: SURGERY
Payer: COMMERCIAL

## 2018-03-25 LAB
ALBUMIN SERPL-MCNC: 2.5 G/DL (ref 3.5–5)
ALBUMIN/GLOB SERPL: 0.6 {RATIO} (ref 1.1–2.2)
ALP SERPL-CCNC: 78 U/L (ref 45–117)
ALT SERPL-CCNC: 39 U/L (ref 12–78)
ANION GAP SERPL CALC-SCNC: 6 MMOL/L (ref 5–15)
AST SERPL-CCNC: 51 U/L (ref 15–37)
BASOPHILS # BLD: 0 K/UL (ref 0–0.1)
BASOPHILS NFR BLD: 0 % (ref 0–1)
BILIRUB SERPL-MCNC: 0.4 MG/DL (ref 0.2–1)
BUN SERPL-MCNC: 50 MG/DL (ref 6–20)
BUN/CREAT SERPL: 49 (ref 12–20)
CALCIUM SERPL-MCNC: 8 MG/DL (ref 8.5–10.1)
CHLORIDE SERPL-SCNC: 104 MMOL/L (ref 97–108)
CO2 SERPL-SCNC: 29 MMOL/L (ref 21–32)
CREAT SERPL-MCNC: 1.02 MG/DL (ref 0.55–1.02)
DIFFERENTIAL METHOD BLD: ABNORMAL
EOSINOPHIL # BLD: 0 K/UL (ref 0–0.4)
EOSINOPHIL NFR BLD: 0 % (ref 0–7)
ERYTHROCYTE [DISTWIDTH] IN BLOOD BY AUTOMATED COUNT: 19 % (ref 11.5–14.5)
EST. AVERAGE GLUCOSE BLD GHB EST-MCNC: 148 MG/DL
GLOBULIN SER CALC-MCNC: 4.4 G/DL (ref 2–4)
GLUCOSE BLD STRIP.AUTO-MCNC: 100 MG/DL (ref 65–100)
GLUCOSE BLD STRIP.AUTO-MCNC: 132 MG/DL (ref 65–100)
GLUCOSE BLD STRIP.AUTO-MCNC: 94 MG/DL (ref 65–100)
GLUCOSE BLD STRIP.AUTO-MCNC: 98 MG/DL (ref 65–100)
GLUCOSE SERPL-MCNC: 113 MG/DL (ref 65–100)
HBA1C MFR BLD: 6.8 % (ref 4.2–6.3)
HCT VFR BLD AUTO: 28.5 % (ref 35–47)
HGB BLD-MCNC: 8.8 G/DL (ref 11.5–16)
IMM GRANULOCYTES # BLD: 0.1 K/UL (ref 0–0.04)
IMM GRANULOCYTES NFR BLD AUTO: 0 % (ref 0–0.5)
LYMPHOCYTES # BLD: 1.5 K/UL (ref 0.8–3.5)
LYMPHOCYTES NFR BLD: 13 % (ref 12–49)
MAGNESIUM SERPL-MCNC: 3.1 MG/DL (ref 1.6–2.4)
MCH RBC QN AUTO: 22.6 PG (ref 26–34)
MCHC RBC AUTO-ENTMCNC: 30.9 G/DL (ref 30–36.5)
MCV RBC AUTO: 73.1 FL (ref 80–99)
MONOCYTES # BLD: 0.5 K/UL (ref 0–1)
MONOCYTES NFR BLD: 4 % (ref 5–13)
NEUTS SEG # BLD: 9.7 K/UL (ref 1.8–8)
NEUTS SEG NFR BLD: 83 % (ref 32–75)
NRBC # BLD: 0.03 K/UL (ref 0–0.01)
NRBC BLD-RTO: 0.3 PER 100 WBC
PLATELET # BLD AUTO: 384 K/UL (ref 150–400)
PMV BLD AUTO: 10.1 FL (ref 8.9–12.9)
POTASSIUM SERPL-SCNC: 4.1 MMOL/L (ref 3.5–5.1)
PROT SERPL-MCNC: 6.9 G/DL (ref 6.4–8.2)
RBC # BLD AUTO: 3.9 M/UL (ref 3.8–5.2)
SERVICE CMNT-IMP: ABNORMAL
SERVICE CMNT-IMP: NORMAL
SODIUM SERPL-SCNC: 139 MMOL/L (ref 136–145)
WBC # BLD AUTO: 11.7 K/UL (ref 3.6–11)

## 2018-03-25 PROCEDURE — 83036 HEMOGLOBIN GLYCOSYLATED A1C: CPT | Performed by: INTERNAL MEDICINE

## 2018-03-25 PROCEDURE — 74011250637 HC RX REV CODE- 250/637: Performed by: INTERNAL MEDICINE

## 2018-03-25 PROCEDURE — 36415 COLL VENOUS BLD VENIPUNCTURE: CPT | Performed by: INTERNAL MEDICINE

## 2018-03-25 PROCEDURE — 74011000258 HC RX REV CODE- 258: Performed by: INTERNAL MEDICINE

## 2018-03-25 PROCEDURE — 80053 COMPREHEN METABOLIC PANEL: CPT | Performed by: INTERNAL MEDICINE

## 2018-03-25 PROCEDURE — 85025 COMPLETE CBC W/AUTO DIFF WBC: CPT | Performed by: INTERNAL MEDICINE

## 2018-03-25 PROCEDURE — 65270000029 HC RM PRIVATE

## 2018-03-25 PROCEDURE — 83735 ASSAY OF MAGNESIUM: CPT | Performed by: INTERNAL MEDICINE

## 2018-03-25 PROCEDURE — 74019 RADEX ABDOMEN 2 VIEWS: CPT

## 2018-03-25 PROCEDURE — 82962 GLUCOSE BLOOD TEST: CPT

## 2018-03-25 PROCEDURE — 74011000250 HC RX REV CODE- 250: Performed by: INTERNAL MEDICINE

## 2018-03-25 PROCEDURE — 77010033678 HC OXYGEN DAILY

## 2018-03-25 RX ORDER — SODIUM CHLORIDE 450 MG/100ML
75 INJECTION, SOLUTION INTRAVENOUS CONTINUOUS
Status: DISCONTINUED | OUTPATIENT
Start: 2018-03-25 | End: 2018-03-26

## 2018-03-25 RX ADMIN — DILTIAZEM HYDROCHLORIDE 240 MG: 120 CAPSULE, COATED, EXTENDED RELEASE ORAL at 07:47

## 2018-03-25 RX ADMIN — ACETAMINOPHEN 650 MG: 325 TABLET ORAL at 01:01

## 2018-03-25 RX ADMIN — ATORVASTATIN CALCIUM 20 MG: 20 TABLET, FILM COATED ORAL at 07:47

## 2018-03-25 RX ADMIN — FERROUS SULFATE TAB 325 MG (65 MG ELEMENTAL FE) 325 MG: 325 (65 FE) TAB at 13:42

## 2018-03-25 RX ADMIN — FLUTICASONE FUROATE AND VILANTEROL TRIFENATATE 1 PUFF: 100; 25 POWDER RESPIRATORY (INHALATION) at 10:57

## 2018-03-25 RX ADMIN — POLYETHYLENE GLYCOL 3350 17 G: 17 POWDER, FOR SOLUTION ORAL at 07:48

## 2018-03-25 RX ADMIN — Medication 10 ML: at 22:18

## 2018-03-25 RX ADMIN — MONTELUKAST SODIUM 10 MG: 10 TABLET, FILM COATED ORAL at 22:16

## 2018-03-25 RX ADMIN — SODIUM BICARBONATE: 84 INJECTION, SOLUTION INTRAVENOUS at 03:11

## 2018-03-25 RX ADMIN — SODIUM CHLORIDE 75 ML/HR: 450 INJECTION, SOLUTION INTRAVENOUS at 13:41

## 2018-03-25 NOTE — PROGRESS NOTES
General Surgery End of Shift Nursing Note    Bedside shift change report given to Talya Gonzalez RN (oncoming nurse) by Ansley Tanner RN (offgoing nurse). Report included the following information SBAR, Kardex, Intake/Output, MAR, Recent Results, Med Rec Status and Cardiac Rhythm SR. Shift worked:   7p-7a   Summary of shift:    Positional IV saline locked, new site in L hand, pt denies pain or nausea, voiding well, passing flatus, no BM   Issues for physician to address:   none     Number times ambulated in hallway past shift: 1  Number of times OOB to chair past shift: 1    Pain Management:  Current medication: none requested  Patient states pain is manageable on current pain medication: YES    GI:    Current diet:  DIET NPO    Tolerating current diet: YES  Passing flatus: YES  Last Bowel Movement: several days ago   Appearance:     Respiratory:    Incentive Spirometer at bedside: YES  Patient instructed on use: YES    Patient Safety:    Falls Score: 2  Bed Alarm On? No  Sitter?  No    Aileen Beyer RN

## 2018-03-25 NOTE — PROGRESS NOTES
Admit Date: 3/24/2018    POD * No surgery found *    Procedure:  * No surgery found *    Subjective:     Patient has no complaints. Objective:     Blood pressure 136/68, pulse 74, temperature 98.3 °F (36.8 °C), resp. rate 22, height 5' 5\" (1.651 m), weight 321 lb 12.8 oz (146 kg), SpO2 96 %. Temp (24hrs), Av.1 °F (36.7 °C), Min:97.2 °F (36.2 °C), Max:98.6 °F (37 °C)      Physical Exam:  GENERAL: alert, cooperative, no distress, appears stated age, LUNG: clear to auscultation bilaterally, HEART: regular rate and rhythm, ABDOMEN: soft, non-tender.  Bowel sounds normal. No masses,  no organomegaly, wound c/d/i, cesia o/p moderate bilat, serous, EXTREMITIES:  extremities normal, atraumatic, no cyanosis or edema    Labs:   Recent Results (from the past 24 hour(s))   URINALYSIS W/ REFLEX CULTURE    Collection Time: 18  3:50 PM   Result Value Ref Range    Color YELLOW/STRAW      Appearance TURBID (A) CLEAR      Specific gravity 1.019 1.003 - 1.030      pH (UA) 5.5 5.0 - 8.0      Protein 30 (A) NEG mg/dL    Glucose NEGATIVE  NEG mg/dL    Ketone NEGATIVE  NEG mg/dL    Blood MODERATE (A) NEG      Urobilinogen 0.2 0.2 - 1.0 EU/dL    Nitrites NEGATIVE  NEG      Leukocyte Esterase SMALL (A) NEG      WBC 0-4 0 - 4 /hpf    RBC 5-10 0 - 5 /hpf    Epithelial cells FEW FEW /lpf    Bacteria NEGATIVE  NEG /hpf    UA:UC IF INDICATED CULTURE NOT INDICATED BY UA RESULT CNI      Mucus TRACE (A) NEG /lpf    Amorphous Crystals 1+ (A) NEG    Granular cast 2-5 (A) NEG /lpf   BILIRUBIN, CONFIRM    Collection Time: 18  3:50 PM   Result Value Ref Range    Bilirubin UA, confirm NEGATIVE  NEG     CREATININE, UR, RANDOM    Collection Time: 18  3:50 PM   Result Value Ref Range    Creatinine, urine 165.00 mg/dL   GLUCOSE, POC    Collection Time: 18  4:10 PM   Result Value Ref Range    Glucose (POC) 159 (H) 65 - 100 mg/dL    Performed by Barry Pelaez (PCT)    SODIUM, UR, RANDOM    Collection Time: 18  4:59 PM Result Value Ref Range    Sodium urine, random 38 MMOL/L   METABOLIC PANEL, BASIC    Collection Time: 03/24/18  5:12 PM   Result Value Ref Range    Sodium 134 (L) 136 - 145 mmol/L    Potassium 4.9 3.5 - 5.1 mmol/L    Chloride 103 97 - 108 mmol/L    CO2 23 21 - 32 mmol/L    Anion gap 8 5 - 15 mmol/L    Glucose 149 (H) 65 - 100 mg/dL    BUN 66 (H) 6 - 20 MG/DL    Creatinine 2.07 (H) 0.55 - 1.02 MG/DL    BUN/Creatinine ratio 32 (H) 12 - 20      GFR est AA 30 (L) >60 ml/min/1.73m2    GFR est non-AA 25 (L) >60 ml/min/1.73m2    Calcium 8.2 (L) 8.5 - 10.1 MG/DL   GLUCOSE, POC    Collection Time: 03/24/18 10:16 PM   Result Value Ref Range    Glucose (POC) 138 (H) 65 - 100 mg/dL    Performed by Ge Bee    CBC WITH AUTOMATED DIFF    Collection Time: 03/25/18  3:25 AM   Result Value Ref Range    WBC 11.7 (H) 3.6 - 11.0 K/uL    RBC 3.90 3.80 - 5.20 M/uL    HGB 8.8 (L) 11.5 - 16.0 g/dL    HCT 28.5 (L) 35.0 - 47.0 %    MCV 73.1 (L) 80.0 - 99.0 FL    MCH 22.6 (L) 26.0 - 34.0 PG    MCHC 30.9 30.0 - 36.5 g/dL    RDW 19.0 (H) 11.5 - 14.5 %    PLATELET 140 889 - 471 K/uL    MPV 10.1 8.9 - 12.9 FL    NRBC 0.3 (H) 0  WBC    ABSOLUTE NRBC 0.03 (H) 0.00 - 0.01 K/uL    NEUTROPHILS 83 (H) 32 - 75 %    LYMPHOCYTES 13 12 - 49 %    MONOCYTES 4 (L) 5 - 13 %    EOSINOPHILS 0 0 - 7 %    BASOPHILS 0 0 - 1 %    IMMATURE GRANULOCYTES 0 0.0 - 0.5 %    ABS. NEUTROPHILS 9.7 (H) 1.8 - 8.0 K/UL    ABS. LYMPHOCYTES 1.5 0.8 - 3.5 K/UL    ABS. MONOCYTES 0.5 0.0 - 1.0 K/UL    ABS. EOSINOPHILS 0.0 0.0 - 0.4 K/UL    ABS. BASOPHILS 0.0 0.0 - 0.1 K/UL    ABS. IMM.  GRANS. 0.1 (H) 0.00 - 0.04 K/UL    DF AUTOMATED     HEMOGLOBIN A1C WITH EAG    Collection Time: 03/25/18  3:25 AM   Result Value Ref Range    Hemoglobin A1c 6.8 (H) 4.2 - 6.3 %    Est. average glucose 148 mg/dL   MAGNESIUM    Collection Time: 03/25/18  3:25 AM   Result Value Ref Range    Magnesium 3.1 (H) 1.6 - 2.4 mg/dL   METABOLIC PANEL, COMPREHENSIVE    Collection Time: 03/25/18 3:25 AM   Result Value Ref Range    Sodium 139 136 - 145 mmol/L    Potassium 4.1 3.5 - 5.1 mmol/L    Chloride 104 97 - 108 mmol/L    CO2 29 21 - 32 mmol/L    Anion gap 6 5 - 15 mmol/L    Glucose 113 (H) 65 - 100 mg/dL    BUN 50 (H) 6 - 20 MG/DL    Creatinine 1.02 0.55 - 1.02 MG/DL    BUN/Creatinine ratio 49 (H) 12 - 20      GFR est AA >60 >60 ml/min/1.73m2    GFR est non-AA 56 (L) >60 ml/min/1.73m2    Calcium 8.0 (L) 8.5 - 10.1 MG/DL    Bilirubin, total 0.4 0.2 - 1.0 MG/DL    ALT (SGPT) 39 12 - 78 U/L    AST (SGOT) 51 (H) 15 - 37 U/L    Alk. phosphatase 78 45 - 117 U/L    Protein, total 6.9 6.4 - 8.2 g/dL    Albumin 2.5 (L) 3.5 - 5.0 g/dL    Globulin 4.4 (H) 2.0 - 4.0 g/dL    A-G Ratio 0.6 (L) 1.1 - 2.2     GLUCOSE, POC    Collection Time: 03/25/18  7:28 AM   Result Value Ref Range    Glucose (POC) 98 65 - 100 mg/dL    Performed by Cristel Torres)        Data Review images and reports reviewed    Assessment:     Active Problems:    Acute renal failure ([facility] [address] (3/24/2018)        Plan/Recommendations/Medical Decision Making:     Continue present treatment   Full liquid diet as bartolo  Cr remarkably back to baseline 1.0 from 3.6 yesterday morning. Sang [place].  Miracle Doll MD, [facility]

## 2018-03-25 NOTE — PROGRESS NOTES
General Surgery End of Shift Nursing Note    Bedside shift change report given to Thien Vaughn RN (oncoming nurse) by Ed Vitale RN (offgoing nurse). Report included the following information SBAR, Kardex, ED Summary, Procedure Summary, Intake/Output, MAR and Recent Results. Shift worked:   7A to [place] of shift:    Weaning oxygen as patient tolerates. Advanced to full liquid diet. IV fluids changed to 1/2 NS at 75 ml/hr. Oxygen off at this time, room air sats 98% while sitting up in chair. . Strip ELLIOTT drains   Issues for physician to address:        Number times ambulated in hallway past shift: 1   Number of times OOB to chair past shift: 2    Pain Management:  Current medication:   Patient states pain is manageable on current pain medication: YES    GI:    Current diet:  DIET DIABETIC FULL LIQUID    Tolerating current diet: YES  Passing flatus: YES  Last Bowel Movement: several days ago   Appearance:     Respiratory:    Incentive Spirometer at bedside: YES  Patient instructed on use: YES    Patient Safety:    Falls Score: 2  Bed Alarm On? No  Sitter?  No    Ed Vitale RN

## 2018-03-25 NOTE — PROGRESS NOTES
Hospitalist Progress Note    NAME: Les Ordonez   :  1960   MRN:  [id number]       Assessment / Plan:  Acute renal failure/Dehydration/Pre-renal: will c/w IVF, US shows no obstruction,  Nephrology help appreciated, so far much improved, c/w bicarbonate drip  Acute encephalopathy due to over medication (percocet) so far resolved, monitor  Recent ventral hernia repair at [facility] Counselor: Surgery help greatly appreciated, abdominal binder had been D/C no signs of compartment SD, XR some ileus, monitor, advancing diet to full liquids today. Drain is in place as per Surgery should F/U with Surgeon (the one that did the initial surgery)  Constipation: ileus noted, passing gas. Suspect ROQUE/OHS: monitor. Diabetes Mellitus 2 Uncontrolled, with hyperglycemia: c/w SSI,  HBA1C 6.8.  hypermorbid obesity, NOS patient weighs 320# - encouraged weight loss, BMI 55  Hypertension, Benign essential : c/w Cardizem, use hydralazine prn  Code status: Obi Ayala [id number]  Prophylaxis: SCD's  Recommended Disposition:  PT, OT, RN     Subjective:     Chief Complaint / Reason for Physician Visit  \"I feel better\". Discussed with RN events overnight. Review of Systems:  Symptom Y/N Comments  Symptom Y/N Comments   Fever/Chills    Chest Pain     Poor Appetite    Edema     Cough    Abdominal Pain     Sputum    Joint Pain     SOB/GALLARDO    Pruritis/Rash     Nausea/vomit    Tolerating PT/OT     Diarrhea    Tolerating Diet y    Constipation    Other       Could NOT obtain due to:      Objective:     VITALS:   Last 24hrs VS reviewed since prior progress note.  Most recent are:  Patient Vitals for the past 24 hrs:   Temp Pulse Resp BP SpO2   18 1022 98.3 °F (36.8 °C) 74 22 136/68 96 %   18 0724 98.2 °F (36.8 °C) 77 20 143/80 96 %   18 0339 98.1 °F (36.7 °C) 72 19 149/69 98 %   18 0152 - - - - 94 %   18 0039 98.6 °F (37 °C) 80 19 151/65 95 %   18 2019 98.6 °F (37 °C) 70 18 155/65 95 %   18 1746 97.2 °F (36.2 °C) 72 18 152/63 96 %   03/24/18 1535 98 °F (36.7 °C) 73 18 157/66 90 %       Intake/Output Summary (Last 24 hours) at 03/25/18 1134  Last data filed at 03/25/18 1109   Gross per 24 hour   Intake          2495.41 ml   Output             3320 ml   Net          -824.59 ml        PHYSICAL EXAM:  General: WD, WN. Alert, cooperative, no acute distress    EENT:  EOMI. Anicteric sclerae. MMM  Resp:  CTA bilaterally, no wheezing or rales. No accessory muscle use  CV:  Regular  rhythm,  No edema  GI:  Soft, Non distended, +  tender.  +Bowel sounds, drain in place  Neurologic:  Alert and oriented X 3, normal speech,   Psych:   Good insight. Not anxious nor agitated  Skin:  No rashes. No jaundice    Reviewed most current lab test results and cultures  YES  Reviewed most current radiology test results   YES  Review and summation of old records today    NO  Reviewed patient's current orders and MAR    YES  PMH/ reviewed - no change compared to H&P  ________________________________________________________________________  Care Plan discussed with:    Comments   Patient y    Family  y    RN y    Care Manager     Consultant                        Multidiciplinary team rounds were held today with , nursing, pharmacist and clinical coordinator. Patient's plan of care was discussed; medications were reviewed and discharge planning was addressed. ________________________________________________________________________  Total NON critical care TIME:  35   Minutes    Total CRITICAL CARE TIME Spent:   Minutes non procedure based      Comments   >50% of visit spent in counseling and coordination of care y    ________________________________________________________________________  Antionette Pryor MD     Procedures: see electronic medical records for all procedures/Xrays and details which were not copied into this note but were reviewed prior to creation of Plan.       LABS:  I reviewed today's most current labs and imaging studies. Pertinent labs include:  Recent Labs      03/25/18 0325 03/24/18   0553  03/23/18 2030   WBC  11.7*  15.3*  19.7*   HGB  8.8*  9.5*  10.1*   HCT  28.5*  31.7*  33.6*   PLT  384  436*  458*     Recent Labs      03/25/18 0325 03/24/18   1712  03/24/18 0553   03/23/18 2030   NA  139  134*  134*   < >  137   K  4.1  4.9  4.2   < >  3.8   CL  104  103  99   < >  96*   CO2  29  23  25   < >  26   GLU  113*  149*  107*   < >  132*   BUN  50*  66*  61*   < >  54*   CREA  1.02  2.07*  3.66*   < >  3.18*   CA  8.0*  8.2*  8.1*   < >  8.2*   MG  3.1*   --   2.9*   --    --    ALB  2.5*   --    --    --   2.9*   TBILI  0.4   --    --    --   0.5   SGOT  51*   --    --    --   62*   ALT  39   --    --    --   41    < > = values in this interval not displayed.        Signed: Yazmin Scott MD

## 2018-03-25 NOTE — PROGRESS NOTES
[facility]   [address], [address], [facility]  Phone: [phone]   [phone]       Nephrology Progress Note  Silvia Garcia     1960     [id number]  Date of Admission : 3/24/2018  03/25/18    CC: Follow up for JESSE       Assessment and Plan   JESSE, multifactorial - volume depletion, NSAIDs, 3rd spacing, perhaps mild rhabdo given mildly elevated CK level; possibly outpatient ACEi as well         -renal US without hydronephrosis         -creat much better after IVF         -adjust IVF    Metabolic acidosis - better  -stop bicarb in IVF     S/P abdominal ventral hernia repair 3/21/18 at [facility]SH     DM     HTN - BP OK     Super morbid obesity        REC:  Change IVF to isotoic bicarb drip and increase rate  Urine sodium and creat  Serial labs     Thank you. Will follow closely. Interval History:  Looks and feels better, up in chair, not SOB, good U/O      Review of Systems: Pertinent items are noted in HPI.     Current Medications:   Current Facility-Administered Medications   Medication Dose Route Frequency    0.45% sodium chloride infusion  75 mL/hr IntraVENous CONTINUOUS    atorvastatin (LIPITOR) tablet 20 mg  20 mg Oral DAILY    dilTIAZem CD (CARDIZEM CD) capsule 240 mg  240 mg Oral DAILY    ferrous sulfate tablet 325 mg  325 mg Oral ACB    fluticasone-vilanterol (BREO ELLIPTA) 100mcg-25mcg/puff  1 Puff Inhalation DAILY    montelukast (SINGULAIR) tablet 10 mg  10 mg Oral QHS    acetaminophen (TYLENOL) tablet 650 mg  650 mg Oral Q4H PRN    ondansetron (ZOFRAN) injection 4 mg  4 mg IntraVENous Q4H PRN    hydrALAZINE (APRESOLINE) 20 mg/mL injection 10 mg  10 mg IntraVENous Q2H PRN    polyethylene glycol (MIRALAX) packet 17 g  17 g Oral DAILY    albuterol-ipratropium (DUO-NEB) 2.5 MG-0.5 MG/3 ML  3 mL Nebulization Q2H PRN    insulin lispro (HUMALOG) injection   SubCUTAneous AC&HS    glucose chewable tablet 16 g  4 Tab Oral PRN    dextrose (D50W) injection syrg 12.5-25 g  12.5-25 g IntraVENous PRN    glucagon (GLUCAGEN) injection 1 mg  1 mg IntraMUSCular PRN    sodium chloride (NS) flush 5-10 mL  5-10 mL IntraVENous Q8H    sodium chloride (NS) flush 5-10 mL  5-10 mL IntraVENous PRN    morphine 2 mg  2 mg IntraVENous Q4H PRN      No Known Allergies    Objective:  Vitals:    Vitals:    03/25/18 0152 03/25/18 0339 03/25/18 0724 03/25/18 1022   BP:  149/69 143/80 136/68   Pulse:  72 77 74   Resp:  19 20 22   Temp:  98.1 °F (36.7 °C) 98.2 °F (36.8 °C) 98.3 °F (36.8 °C)   SpO2: 94% 98% 96% 96%   Weight:   146 kg (321 lb 12.8 oz)    Height:         Intake and Output:  03/25 0701 - 03/25 1900  In: 664.2 [P.O.:240; I.V.:424.2]  Out: 253 [Urine:875]  03/23 1901 - 03/25 0700  In: 2331.2 [I.V.:2331.2]  Out: 4619 [Urine:2665; Drains:80]    Physical Examination:  Pt intubated    No, looks much better  General: NAD,Conversant   Neck:  Supple, no mass  Resp:  Lungs CTA   CV:  RRR,  no murmur or rub, 1+LE edema  GI:  Soft, NT, + Bowel sounds, obese  Neurologic:  Non focal  Psych:             AAO x 3 appropriate affect   Skin:  No Rash  :  nicolas    []    High complexity decision making was performed  []    Patient is at high-risk of decompensation with multiple organ involvement    Lab Data Personally Reviewed: I have reviewed all the pertinent labs, microbiology data and radiology studies during assessment.     Recent Labs      03/25/18   0325  03/24/18   1712  03/24/18   0553  03/23/18   2226  03/23/18   2030   NA  139  134*  134*  138  137   K  4.1  4.9  4.2  4.0  3.8   CL  104  103  99  98  96*   CO2  29  23  25  29  26   GLU  113*  149*  107*  124*  132*   BUN  50*  66*  61*  56*  54*   CREA  1.02  2.07*  3.66*  3.27*  3.18*   CA  8.0*  8.2*  8.1*  8.2*  8.2*   MG  3.1*   --   2.9*   --    --    ALB  2.5*   --    --    --   2.9*   SGOT  51*   --    --    --   62*   ALT  39   --    --    --   41     Recent Labs      03/25/18   0325  03/24/18   0553  03/23/18   2030   WBC  11.7* 15.3*  19.7*   HGB  8.8*  9.5*  10.1*   HCT  28.5*  31.7*  33.6*   PLT  384  436*  458*     No results found for: SDES  Lab Results   Component Value Date/Time    Culture result: NO GROWTH 1 DAY 03/23/2018 11:30 PM     Recent Results (from the past 24 hour(s))   URINALYSIS W/ REFLEX CULTURE    Collection Time: 03/24/18  3:50 PM   Result Value Ref Range    Color YELLOW/STRAW      Appearance TURBID (A) CLEAR      Specific gravity 1.019 1.003 - 1.030      pH (UA) 5.5 5.0 - 8.0      Protein 30 (A) NEG mg/dL    Glucose NEGATIVE  NEG mg/dL    Ketone NEGATIVE  NEG mg/dL    Blood MODERATE (A) NEG      Urobilinogen 0.2 0.2 - 1.0 EU/dL    Nitrites NEGATIVE  NEG      Leukocyte Esterase SMALL (A) NEG      WBC 0-4 0 - 4 /hpf    RBC 5-10 0 - 5 /hpf    Epithelial cells FEW FEW /lpf    Bacteria NEGATIVE  NEG /hpf    UA:UC IF INDICATED CULTURE NOT INDICATED BY UA RESULT CNI      Mucus TRACE (A) NEG /lpf    Amorphous Crystals 1+ (A) NEG    Granular cast 2-5 (A) NEG /lpf   BILIRUBIN, CONFIRM    Collection Time: 03/24/18  3:50 PM   Result Value Ref Range    Bilirubin UA, confirm NEGATIVE  NEG     CREATININE, UR, RANDOM    Collection Time: 03/24/18  3:50 PM   Result Value Ref Range    Creatinine, urine 165.00 mg/dL   GLUCOSE, POC    Collection Time: 03/24/18  4:10 PM   Result Value Ref Range    Glucose (POC) 159 (H) 65 - 100 mg/dL    Performed by Vanessa Aranda (PCT)    SODIUM, UR, RANDOM    Collection Time: 03/24/18  4:59 PM   Result Value Ref Range    Sodium urine, random 38 MMOL/L   METABOLIC PANEL, BASIC    Collection Time: 03/24/18  5:12 PM   Result Value Ref Range    Sodium 134 (L) 136 - 145 mmol/L    Potassium 4.9 3.5 - 5.1 mmol/L    Chloride 103 97 - 108 mmol/L    CO2 23 21 - 32 mmol/L    Anion gap 8 5 - 15 mmol/L    Glucose 149 (H) 65 - 100 mg/dL    BUN 66 (H) 6 - 20 MG/DL    Creatinine 2.07 (H) 0.55 - 1.02 MG/DL    BUN/Creatinine ratio 32 (H) 12 - 20      GFR est AA 30 (L) >60 ml/min/1.73m2    GFR est non-AA 25 (L) >60 ml/min/1.73m2    Calcium 8.2 (L) 8.5 - 10.1 MG/DL   GLUCOSE, POC    Collection Time: 03/24/18 10:16 PM   Result Value Ref Range    Glucose (POC) 138 (H) 65 - 100 mg/dL    Performed by [facility]    CBC WITH AUTOMATED DIFF    Collection Time: 03/25/18  3:25 AM   Result Value Ref Range    WBC 11.7 (H) 3.6 - 11.0 K/uL    RBC 3.90 3.80 - 5.20 M/uL    HGB 8.8 (L) 11.5 - 16.0 g/dL    HCT 28.5 (L) 35.0 - 47.0 %    MCV 73.1 (L) 80.0 - 99.0 FL    MCH 22.6 (L) 26.0 - 34.0 PG    MCHC 30.9 30.0 - 36.5 g/dL    RDW 19.0 (H) 11.5 - 14.5 %    PLATELET 032 991 - 034 K/uL    MPV 10.1 8.9 - 12.9 FL    NRBC 0.3 (H) 0  WBC    ABSOLUTE NRBC 0.03 (H) 0.00 - 0.01 K/uL    NEUTROPHILS 83 (H) 32 - 75 %    LYMPHOCYTES 13 12 - 49 %    MONOCYTES 4 (L) 5 - 13 %    EOSINOPHILS 0 0 - 7 %    BASOPHILS 0 0 - 1 %    IMMATURE GRANULOCYTES 0 0.0 - 0.5 %    ABS. NEUTROPHILS 9.7 (H) 1.8 - 8.0 K/UL    ABS. LYMPHOCYTES 1.5 0.8 - 3.5 K/UL    ABS. MONOCYTES 0.5 0.0 - 1.0 K/UL    ABS. EOSINOPHILS 0.0 0.0 - 0.4 K/UL    ABS. BASOPHILS 0.0 0.0 - 0.1 K/UL    ABS. IMM.  GRANS. 0.1 (H) 0.00 - 0.04 K/UL    DF AUTOMATED     HEMOGLOBIN A1C WITH EAG    Collection Time: 03/25/18  3:25 AM   Result Value Ref Range    Hemoglobin A1c 6.8 (H) 4.2 - 6.3 %    Est. average glucose 148 mg/dL   MAGNESIUM    Collection Time: 03/25/18  3:25 AM   Result Value Ref Range    Magnesium 3.1 (H) 1.6 - 2.4 mg/dL   METABOLIC PANEL, COMPREHENSIVE    Collection Time: 03/25/18  3:25 AM   Result Value Ref Range    Sodium 139 136 - 145 mmol/L    Potassium 4.1 3.5 - 5.1 mmol/L    Chloride 104 97 - 108 mmol/L    CO2 29 21 - 32 mmol/L    Anion gap 6 5 - 15 mmol/L    Glucose 113 (H) 65 - 100 mg/dL    BUN 50 (H) 6 - 20 MG/DL    Creatinine 1.02 0.55 - 1.02 MG/DL    BUN/Creatinine ratio 49 (H) 12 - 20      GFR est AA >60 >60 ml/min/1.73m2    GFR est non-AA 56 (L) >60 ml/min/1.73m2    Calcium 8.0 (L) 8.5 - 10.1 MG/DL    Bilirubin, total 0.4 0.2 - 1.0 MG/DL    ALT (SGPT) 39 12 - 78 U/L    AST (SGOT) 51 (H) 15 - 37 U/L    Alk. phosphatase 78 45 - 117 U/L    Protein, total 6.9 6.4 - 8.2 g/dL    Albumin 2.5 (L) 3.5 - 5.0 g/dL    Globulin 4.4 (H) 2.0 - 4.0 g/dL    A-G Ratio 0.6 (L) 1.1 - 2.2     GLUCOSE, POC    Collection Time: 03/25/18  7:28 AM   Result Value Ref Range    Glucose (POC) 98 65 - 100 mg/dL    Performed by Nelly SHAVER(CON)    GLUCOSE, POC    Collection Time: 03/25/18 12:22 PM   Result Value Ref Range    Glucose (POC) 100 65 - 100 mg/dL    Performed by Daniel Castro (PCT)            Total time spent with patient:  xxx   min. Care Plan discussed with:  Patient     Family      RN      Consulting Physician [address] [place] [address]        I have reviewed the flowsheets. Chart and Pertinent Notes have been reviewed. No change in PMH ,family and social history from Consult note.       Usama Licona MD

## 2018-03-26 ENCOUNTER — HOME HEALTH ADMISSION (OUTPATIENT)
Dept: HOME HEALTH SERVICES | Facility: HOME HEALTH | Age: 58
End: 2018-03-26

## 2018-03-26 VITALS
BODY MASS INDEX: 48.82 KG/M2 | HEART RATE: 72 BPM | DIASTOLIC BLOOD PRESSURE: 66 MMHG | TEMPERATURE: 98.2 F | OXYGEN SATURATION: 99 % | WEIGHT: 293 LBS | SYSTOLIC BLOOD PRESSURE: 147 MMHG | RESPIRATION RATE: 18 BRPM | HEIGHT: 65 IN

## 2018-03-26 PROBLEM — Z87.19 S/P REPAIR OF VENTRAL HERNIA: Status: ACTIVE | Noted: 2018-03-26

## 2018-03-26 PROBLEM — Z98.890 S/P REPAIR OF VENTRAL HERNIA: Status: ACTIVE | Noted: 2018-03-26

## 2018-03-26 LAB
ALBUMIN SERPL-MCNC: 2.3 G/DL (ref 3.5–5)
ALBUMIN/GLOB SERPL: 0.6 {RATIO} (ref 1.1–2.2)
ALP SERPL-CCNC: 75 U/L (ref 45–117)
ALT SERPL-CCNC: 31 U/L (ref 12–78)
ANION GAP SERPL CALC-SCNC: 6 MMOL/L (ref 5–15)
AST SERPL-CCNC: 34 U/L (ref 15–37)
BASOPHILS # BLD: 0 K/UL (ref 0–0.1)
BASOPHILS NFR BLD: 0 % (ref 0–1)
BILIRUB SERPL-MCNC: 0.6 MG/DL (ref 0.2–1)
BUN SERPL-MCNC: 25 MG/DL (ref 6–20)
BUN/CREAT SERPL: 45 (ref 12–20)
CALCIUM SERPL-MCNC: 7.7 MG/DL (ref 8.5–10.1)
CHLORIDE SERPL-SCNC: 102 MMOL/L (ref 97–108)
CO2 SERPL-SCNC: 31 MMOL/L (ref 21–32)
CREAT SERPL-MCNC: 0.55 MG/DL (ref 0.55–1.02)
DIFFERENTIAL METHOD BLD: ABNORMAL
EOSINOPHIL # BLD: 0 K/UL (ref 0–0.4)
EOSINOPHIL NFR BLD: 0 % (ref 0–7)
ERYTHROCYTE [DISTWIDTH] IN BLOOD BY AUTOMATED COUNT: 18.6 % (ref 11.5–14.5)
GLOBULIN SER CALC-MCNC: 3.9 G/DL (ref 2–4)
GLUCOSE BLD STRIP.AUTO-MCNC: 106 MG/DL (ref 65–100)
GLUCOSE BLD STRIP.AUTO-MCNC: 99 MG/DL (ref 65–100)
GLUCOSE SERPL-MCNC: 90 MG/DL (ref 65–100)
HCT VFR BLD AUTO: 29.1 % (ref 35–47)
HGB BLD-MCNC: 8.8 G/DL (ref 11.5–16)
IMM GRANULOCYTES # BLD: 0 K/UL (ref 0–0.04)
IMM GRANULOCYTES NFR BLD AUTO: 0 % (ref 0–0.5)
LYMPHOCYTES # BLD: 2.4 K/UL (ref 0.8–3.5)
LYMPHOCYTES NFR BLD: 34 % (ref 12–49)
MAGNESIUM SERPL-MCNC: 2.7 MG/DL (ref 1.6–2.4)
MCH RBC QN AUTO: 22.1 PG (ref 26–34)
MCHC RBC AUTO-ENTMCNC: 30.2 G/DL (ref 30–36.5)
MCV RBC AUTO: 72.9 FL (ref 80–99)
MONOCYTES # BLD: 0.4 K/UL (ref 0–1)
MONOCYTES NFR BLD: 6 % (ref 5–13)
NEUTS SEG # BLD: 4.2 K/UL (ref 1.8–8)
NEUTS SEG NFR BLD: 60 % (ref 32–75)
NRBC # BLD: 0.05 K/UL (ref 0–0.01)
NRBC BLD-RTO: 0.7 PER 100 WBC
PLATELET # BLD AUTO: 366 K/UL (ref 150–400)
PMV BLD AUTO: 9.7 FL (ref 8.9–12.9)
POTASSIUM SERPL-SCNC: 3.4 MMOL/L (ref 3.5–5.1)
PROT SERPL-MCNC: 6.2 G/DL (ref 6.4–8.2)
RBC # BLD AUTO: 3.99 M/UL (ref 3.8–5.2)
SERVICE CMNT-IMP: ABNORMAL
SERVICE CMNT-IMP: NORMAL
SODIUM SERPL-SCNC: 139 MMOL/L (ref 136–145)
WBC # BLD AUTO: 7.1 K/UL (ref 3.6–11)

## 2018-03-26 PROCEDURE — 74011250637 HC RX REV CODE- 250/637: Performed by: INTERNAL MEDICINE

## 2018-03-26 PROCEDURE — 85025 COMPLETE CBC W/AUTO DIFF WBC: CPT | Performed by: INTERNAL MEDICINE

## 2018-03-26 PROCEDURE — G8988 SELF CARE GOAL STATUS: HCPCS | Performed by: OCCUPATIONAL THERAPIST

## 2018-03-26 PROCEDURE — G8980 MOBILITY D/C STATUS: HCPCS

## 2018-03-26 PROCEDURE — G8989 SELF CARE D/C STATUS: HCPCS | Performed by: OCCUPATIONAL THERAPIST

## 2018-03-26 PROCEDURE — G8979 MOBILITY GOAL STATUS: HCPCS

## 2018-03-26 PROCEDURE — 97165 OT EVAL LOW COMPLEX 30 MIN: CPT | Performed by: OCCUPATIONAL THERAPIST

## 2018-03-26 PROCEDURE — 97161 PT EVAL LOW COMPLEX 20 MIN: CPT

## 2018-03-26 PROCEDURE — 74011000258 HC RX REV CODE- 258: Performed by: INTERNAL MEDICINE

## 2018-03-26 PROCEDURE — 82962 GLUCOSE BLOOD TEST: CPT

## 2018-03-26 PROCEDURE — 36415 COLL VENOUS BLD VENIPUNCTURE: CPT | Performed by: INTERNAL MEDICINE

## 2018-03-26 PROCEDURE — G8978 MOBILITY CURRENT STATUS: HCPCS

## 2018-03-26 PROCEDURE — G8987 SELF CARE CURRENT STATUS: HCPCS | Performed by: OCCUPATIONAL THERAPIST

## 2018-03-26 PROCEDURE — 80053 COMPREHEN METABOLIC PANEL: CPT | Performed by: INTERNAL MEDICINE

## 2018-03-26 PROCEDURE — 83735 ASSAY OF MAGNESIUM: CPT | Performed by: INTERNAL MEDICINE

## 2018-03-26 RX ORDER — OXYCODONE AND ACETAMINOPHEN 5; 325 MG/1; MG/1
2 TABLET ORAL
Qty: 30 TAB | Refills: 0 | Status: SHIPPED | OUTPATIENT
Start: 2018-03-26 | End: 2018-04-14

## 2018-03-26 RX ORDER — POTASSIUM CHLORIDE 20 MEQ/1
20 TABLET, EXTENDED RELEASE ORAL 2 TIMES DAILY
Status: DISCONTINUED | OUTPATIENT
Start: 2018-03-26 | End: 2018-03-26

## 2018-03-26 RX ORDER — POLYETHYLENE GLYCOL 3350 17 G/17G
17 POWDER, FOR SOLUTION ORAL DAILY
Qty: 30 EACH | Refills: 1 | Status: SHIPPED | OUTPATIENT
Start: 2018-03-27 | End: 2018-04-14

## 2018-03-26 RX ORDER — POTASSIUM CHLORIDE 20 MEQ/1
20 TABLET, EXTENDED RELEASE ORAL
Status: COMPLETED | OUTPATIENT
Start: 2018-03-26 | End: 2018-03-26

## 2018-03-26 RX ORDER — POTASSIUM CHLORIDE 20 MEQ/1
20 TABLET, EXTENDED RELEASE ORAL ONCE
Status: COMPLETED | OUTPATIENT
Start: 2018-03-26 | End: 2018-03-26

## 2018-03-26 RX ORDER — ONDANSETRON HYDROCHLORIDE 8 MG/1
8 TABLET, FILM COATED ORAL
Qty: 20 TAB | Refills: 0 | Status: SHIPPED | OUTPATIENT
Start: 2018-03-26 | End: 2018-04-14

## 2018-03-26 RX ADMIN — ATORVASTATIN CALCIUM 20 MG: 20 TABLET, FILM COATED ORAL at 08:23

## 2018-03-26 RX ADMIN — Medication 10 ML: at 08:23

## 2018-03-26 RX ADMIN — POTASSIUM CHLORIDE 20 MEQ: 20 TABLET, EXTENDED RELEASE ORAL at 08:22

## 2018-03-26 RX ADMIN — FERROUS SULFATE TAB 325 MG (65 MG ELEMENTAL FE) 325 MG: 325 (65 FE) TAB at 08:23

## 2018-03-26 RX ADMIN — FLUTICASONE FUROATE AND VILANTEROL TRIFENATATE 1 PUFF: 100; 25 POWDER RESPIRATORY (INHALATION) at 08:29

## 2018-03-26 RX ADMIN — POLYETHYLENE GLYCOL 3350 17 G: 17 POWDER, FOR SOLUTION ORAL at 08:22

## 2018-03-26 RX ADMIN — ACETAMINOPHEN 650 MG: 325 TABLET ORAL at 00:13

## 2018-03-26 RX ADMIN — SODIUM CHLORIDE 75 ML/HR: 450 INJECTION, SOLUTION INTRAVENOUS at 02:13

## 2018-03-26 RX ADMIN — POTASSIUM CHLORIDE 20 MEQ: 20 TABLET, EXTENDED RELEASE ORAL at 14:13

## 2018-03-26 RX ADMIN — ACETAMINOPHEN 650 MG: 325 TABLET ORAL at 10:40

## 2018-03-26 RX ADMIN — DILTIAZEM HYDROCHLORIDE 240 MG: 120 CAPSULE, COATED, EXTENDED RELEASE ORAL at 08:23

## 2018-03-26 RX ADMIN — Medication 10 ML: at 14:14

## 2018-03-26 NOTE — PROGRESS NOTES
Occupational Therapy EVALUATION/discharge  Patient: Vesta Mcfarland (18 y.o. female)  Date: 3/26/2018  Primary Diagnosis: Acute Renal Failure  Acute renal failure (HCC)        Precautions: none       ASSESSMENT:   Based on the objective data described below, the patient presents with baseline obesity and post op abdominal pain s/p recent hernia repair at OSH. Overall she is performing ADLs and functional mobility at her independent baseline. Further skilled acute occupational therapy is not indicated at this time and patient has adequate family support PRN after discharge. Discharge Recommendations: None  Further Equipment Recommendations for Discharge: none          OBJECTIVE DATA SUMMARY:   HISTORY:   Past Medical History:   Diagnosis Date    Anemia     Diabetes mellitus type II     Hypertension     Menopause      Past Surgical History:   Procedure Laterality Date    HX GI  2006     bowel surgery for infection s/p colostomy and then removal     HX PELVIC LAPAROSCOPY         Prior Level of Function/Environment/Context: independent  Occupations in which the patient is/was successful, what are the barriers preventing that success:   Performance Patterns (routines, roles, habits, and rituals):   Personal Interests and/or values:   Expanded or extensive additional review of patient history:     Home Situation  Home Environment: Private residence  # Steps to Enter: 2  One/Two Story Residence: One story  Living Alone: No  Support Systems: Spouse/Significant Other/Partner, Child(mallorie)  Patient Expects to be Discharged to[de-identified] Private residence  Current DME Used/Available at Home: melly Duran  []  Right hand dominant   []  Left hand dominant    EXAMINATION OF PERFORMANCE DEFICITS:  Cognitive/Behavioral Status:  Neurologic State: Alert  Orientation Level: Oriented X4  Cognition: Appropriate decision making; Appropriate for age attention/concentration; Appropriate safety awareness; Follows commands Safety/Judgement: Awareness of environment; Insight into deficits      Hearing: Auditory  Auditory Impairment: None    Vision/Perceptual:     Acuity: Within Defined Limits         Range of Motion:  AROM: Generally decreased, functional                         Strength:  Strength: Within functional limits                Coordination:  Coordination: Within functional limits            Tone & Sensation:  Tone: Normal  Sensation: Intact                      Balance:  Sitting: Intact  Standing: Intact    Functional Mobility and Transfers for ADLs:  Bed Mobility:  Scooting: Independent    Transfers:  Sit to Stand: Independent  Stand to Sit: Independent  Bed to Chair: Independent  Toilet Transfer : Independent    ADL Assessment:  Feeding: Independent    Oral Facial Hygiene/Grooming: Independent    Bathing: Independent    Upper Body Dressing: Independent    Lower Body Dressing: Independent; Modified independent    Toileting: Independent                ADL Intervention and task modifications:    Compensatory LB dressing training provided and patient completing with modified independence. Functional Measure:  Barthel Index:    Bathin  Bladder: 10  Bowels: 10  Groomin  Dressing: 10  Feeding: 10  Mobility: 15  Stairs: 10  Toilet Use: 10  Transfer (Bed to Chair and Back): 15  Total: 100       Barthel and G-code impairment scale:  Percentage of impairment CH  0% CI  1-19% CJ  20-39% CK  40-59% CL  60-79% CM  80-99% CN  100%   Barthel Score 0-100 100 99-80 79-60 59-40 20-39 1-19   0   Barthel Score 0-20 20 17-19 13-16 9-12 5-8 1-4 0      The Barthel ADL Index: Guidelines  1. The index should be used as a record of what a patient does, not as a record of what a patient could do. 2. The main aim is to establish degree of independence from any help, physical or verbal, however minor and for whatever reason. 3. The need for supervision renders the patient not independent.   4. A patient's performance should be established using the best available evidence. Asking the patient, friends/relatives and nurses are the usual sources, but direct observation and common sense are also important. However direct testing is not needed. 5. Usually the patient's performance over the preceding 24-48 hours is important, but occasionally longer periods will be relevant. 6. Middle categories imply that the patient supplies over 50 per cent of the effort. 7. Use of aids to be independent is allowed. Ramón Storm., Barthel, D.W. ([id number]). Functional evaluation: the Barthel Index. [facility] (14)2. TIN Villasenor, Dedrick Slaughter., Arleen Prater., [place], [address] (1999). Measuring the change indisability after inpatient rehabilitation; comparison of the responsiveness of the Barthel Index and Functional [place] Measure. Journal of Neurology, Neurosurgery, and Psychiatry, 66(4), 492-844. JOSE Ventura, MARISA Chase, & Cameron Kessler MSHAHID. (2004.) Assessment of post-stroke quality of life in cost-effectiveness studies: The usefulness of the Barthel Index and the EuroQoL-5D. Quality of Life Research, 13, 167-63       G codes: In compliance with CMSs Claims Based Outcome Reporting, the following G-code set was chosen for this patient based on their primary functional limitation being treated: The outcome measure chosen to determine the severity of the functional limitation was the Barthel Index with a score of 100/100 which was correlated with the impairment scale. ?  Self Care:     - CURRENT STATUS: CH - 0% impaired, limited or restricted    - GOAL STATUS: CH - 0% impaired, limited or restricted    - D/C STATUS:  CH - 0% impaired, limited or restricted   Pain:  Pain Scale 1: Numeric (0 - 10)  Pain Intensity 1: 0  Pain Location 1: Leg  Pain Orientation 1: Left;Right     Pain Intervention(s) 1: Medication (see MAR)    After treatment:   [x]  Patient left in no apparent distress sitting up in chair  [] Patient left in no apparent distress in bed  [x]  Call bell left within reach  [x]  Nursing notified  []  Caregiver present  []  Bed alarm activated    COMMUNICATION/EDUCATION:   Communication/Collaboration:  [x]      Home safety education was provided and the patient/caregiver indicated understanding. [x]      Patient/family have participated as able and agree with findings and recommendations. []      Patient is unable to participate in plan of care at this time.   Findings and recommendations were discussed with: Physical Therapist    FADY [place]/L  Time Calculation: 15 mins

## 2018-03-26 NOTE — PROGRESS NOTES
physical Therapy EVALUATION/DISCHARGE  Patient: Macarena Ricketts (69 y.o. female)  Date: 3/26/2018  Primary Diagnosis: Acute Renal Failure  Acute renal failure (HCC)        Precautions:      ASSESSMENT :  Based on the objective data described below, the patient presents with increased pain, but safe mobility. Dicussed with nursing and pt had been up ambulating int he halls with nursing. Upon entering pt was finishing with OT. She agreed to ambulate into the villalpando and negotiate stairs to maintain functional mobility. She performed all mobility at an independent level. Ambulated down the villalpando and negotiated 2 steps using step over step pattern with B rails. Ambulated back to the room for a total of 150ft, safe house hold distance. She was limited by pain at this time and nursing notified. Very safe with all mobility and encouraged to build up to 3 walks a day with nursing. Does not need acute care PT services, will sign off. Skilled physical therapy is not indicated at this time. PLAN :  Discharge Recommendations: None  Further Equipment Recommendations for Discharge: none     SUBJECTIVE:   Patient stated I have been walking in the halls with nursing.     OBJECTIVE DATA SUMMARY:   HISTORY:    Past Medical History:   Diagnosis Date    Anemia     Diabetes mellitus type II     Hypertension     Menopause      Past Surgical History:   Procedure Laterality Date    HX GI  2006     bowel surgery for infection s/p colostomy and then removal     HX PELVIC LAPAROSCOPY       Prior Level of Function/Home Situation: pt was independent at home, family has been assisting with ADLs and will continue to assist as needed at home. 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   Personal factors and/or comorbidities impacting plan of care:     Home Situation  Home Environment: Private residence  # Steps to Enter: 2  One/Two Story Residence: One story  Living Alone: No  Support Systems: Spouse/Significant Other/Partner, Child(mallorie)  Patient Expects to be Discharged to[de-identified] Private residence  Current DME Used/Available at Home: [address] [place]    EXAMINATION/PRESENTATION/DECISION MAKING:   Critical Behavior:  Neurologic State: Alert  Orientation Level: Oriented X4  Cognition: Appropriate decision making, Appropriate for age attention/concentration, Appropriate safety awareness, Follows commands  Safety/Judgement: Awareness of environment, Insight into deficits  Hearing: Auditory  Auditory Impairment: None  Skin:  intact  Edema: WDL  Range Of Motion:  AROM: Generally decreased, functional                       Strength:    Strength:  Within functional limits                    Tone & Sensation:   Tone: Normal              Sensation: Intact               Coordination:  Coordination: Within functional limits  Vision:   Acuity: Within Defined Limits  Functional Mobility:  Bed Mobility:           Scooting: Independent  Transfers:  Sit to Stand: Independent  Stand to Sit: Independent        Bed to Chair: Independent              Balance:   Sitting: Intact  Standing: Intact  Ambulation/Gait Training:  Distance (ft): 150 Feet (ft)  Assistive Device:  (none)  Ambulation - Level of Assistance: Independent        Gait Abnormalities: Antalgic        Base of Support: Widened                    Stairs:  Number of Stairs Trained: 2  Stairs - Level of Assistance: Independent   Rail Use: Both     Therapeutic Exercises:   Educated and had pt perform ankle pumps    Functional Measure:  Barthel Index:    Bathin  Bladder: 10  Bowels: 10  Groomin  Dressing: 10  Feeding: 10  Mobility: 15  Stairs: 10  Toilet Use: 10  Transfer (Bed to Chair and Back): 15  Total: 100       Barthel and G-code impairment scale:  Percentage of impairment CH  0% CI  1-19% CJ  20-39% CK  40-59% CL  60-79% CM  80-99% CN  100%   Barthel Score 0-100 100 99-80 79-60 59-40 20-39 1-19   0   Barthel Score 0-20 20 17-19 13-16 9-12 5-8 1-4 0      The Barthel ADL Index: Guidelines  1. The index should be used as a record of what a patient does, not as a record of what a patient could do. 2. The main aim is to establish degree of independence from any help, physical or verbal, however minor and for whatever reason. 3. The need for supervision renders the patient not independent. 4. A patient's performance should be established using the best available evidence. Asking the patient, friends/relatives and nurses are the usual sources, but direct observation and common sense are also important. However direct testing is not needed. 5. Usually the patient's performance over the preceding 24-48 hours is important, but occasionally longer periods will be relevant. 6. Middle categories imply that the patient supplies over 50 per cent of the effort. 7. Use of aids to be independent is allowed. Charley Albright., Barthel, ROBINW. ([id number]). Functional evaluation: the Barthel Index. [address] [facility] (14)2. Julia Mock reza TIN Herrera, Charlene Mathur., Bernard Javed., [place], [address] [place] [address] (1999). Measuring the change indisability after inpatient rehabilitation; comparison of the responsiveness of the Barthel Index and Functional [place] Measure. Journal of Neurology, Neurosurgery, and Psychiatry, 66(4), 958-801. Sam Parul, N.J.A, Michelle Munson,  W.J.M, & Vasquez Ferrell, M.A. (2004.) Assessment of post-stroke quality of life in cost-effectiveness studies: The usefulness of the Barthel Index and the EuroQoL-5D. Quality of Life Research, 13, 156-57         G codes: In compliance with CMSs Claims Based Outcome Reporting, the following G-code set was chosen for this patient based on their primary functional limitation being treated: The outcome measure chosen to determine the severity of the functional limitation was the barthel with a score of 100/100 which was correlated with the impairment scale.     ? Mobility - Walking and Moving Around:     - CURRENT STATUS: CH - 0% impaired, limited or restricted    - GOAL STATUS: CH - 0% impaired, limited or restricted    - D/C STATUS:  CH - 0% impaired, limited or restricted        Physical Therapy Evaluation Charge Determination   History Examination Presentation Decision-Making   MEDIUM  Complexity : 1-2 comorbidities / personal factors will impact the outcome/ POC  LOW Complexity : 1-2 Standardized tests and measures addressing body structure, function, activity limitation and / or participation in recreation  LOW Complexity : Stable, uncomplicated  Other outcome measures barthel  LOW       Based on the above components, the patient evaluation is determined to be of the following complexity level: LOW     Pain:  Pain Scale 1: Numeric (0 - 10)  Pain Intensity 1: 0  Pain Location 1: Leg  Activity Tolerance:   VSS  Please refer to the flowsheet for vital signs taken during this treatment. After treatment:   [x]   Patient left in no apparent distress sitting up in chair  []   Patient left in no apparent distress in bed  [x]   Call bell left within reach  [x]   Nursing notified  []   Caregiver present  []   Bed alarm activated    COMMUNICATION/EDUCATION:   Communication/Collaboration:  [x]   Fall prevention education was provided and the patient/caregiver indicated understanding. [x]   Patient/family have participated as able and agree with findings and recommendations. []   Patient is unable to participate in plan of care at this time.   Findings and recommendations were discussed with: Occupational Therapist and Registered Nurse    Thank you for this referral.  Melford Kayser, PT, DPT   Time Calculation: 12 mins

## 2018-03-26 NOTE — PROGRESS NOTES
Admit Date: 3/24/2018    POD * No surgery found *    Procedure:  * No surgery found *    Subjective:     Patient has no complaints. +flatus. No BM yet    Objective:     Blood pressure 141/61, pulse 70, temperature 98 °F (36.7 °C), resp. rate 18, height 5' 5\" (1.651 m), weight 321 lb 12.8 oz (146 kg), SpO2 97 %. Temp (24hrs), Av.1 °F (36.7 °C), Min:97.8 °F (36.6 °C), Max:98.3 °F (36.8 °C)      Physical Exam:  GENERAL: alert, cooperative, no distress, appears stated age, LUNG: clear to auscultation bilaterally, HEART: regular rate and rhythm, ABDOMEN: soft, non-tender. Bowel sounds normal. No masses,  no organomegaly, wound c/d/i, cesia o/p moderate bilat, serous, EXTREMITIES:  extremities normal, atraumatic, no cyanosis or edema    Labs:   Recent Results (from the past 24 hour(s))   GLUCOSE, POC    Collection Time: 18 12:22 PM   Result Value Ref Range    Glucose (POC) 100 65 - 100 mg/dL    Performed by Marques Reji (PCT)    GLUCOSE, POC    Collection Time: 18  4:15 PM   Result Value Ref Range    Glucose (POC) 94 65 - 100 mg/dL    Performed by Marques Castanedauson (PCT)    GLUCOSE, POC    Collection Time: 18 10:08 PM   Result Value Ref Range    Glucose (POC) 132 (H) 65 - 100 mg/dL    Performed by Mike Meek    CBC WITH AUTOMATED DIFF    Collection Time: 18  5:01 AM   Result Value Ref Range    WBC 7.1 3.6 - 11.0 K/uL    RBC 3.99 3.80 - 5.20 M/uL    HGB 8.8 (L) 11.5 - 16.0 g/dL    HCT 29.1 (L) 35.0 - 47.0 %    MCV 72.9 (L) 80.0 - 99.0 FL    MCH 22.1 (L) 26.0 - 34.0 PG    MCHC 30.2 30.0 - 36.5 g/dL    RDW 18.6 (H) 11.5 - 14.5 %    PLATELET 438 593 - 641 K/uL    MPV 9.7 8.9 - 12.9 FL    NRBC 0.7 (H) 0  WBC    ABSOLUTE NRBC 0.05 (H) 0.00 - 0.01 K/uL    NEUTROPHILS 60 32 - 75 %    LYMPHOCYTES 34 12 - 49 %    MONOCYTES 6 5 - 13 %    EOSINOPHILS 0 0 - 7 %    BASOPHILS 0 0 - 1 %    IMMATURE GRANULOCYTES 0 0.0 - 0.5 %    ABS. NEUTROPHILS 4.2 1.8 - 8.0 K/UL    ABS.  LYMPHOCYTES 2.4 0.8 - 3.5 K/UL    ABS. MONOCYTES 0.4 0.0 - 1.0 K/UL    ABS. EOSINOPHILS 0.0 0.0 - 0.4 K/UL    ABS. BASOPHILS 0.0 0.0 - 0.1 K/UL    ABS. IMM. GRANS. 0.0 0.00 - 0.04 K/UL    DF AUTOMATED     MAGNESIUM    Collection Time: 03/26/18  5:01 AM   Result Value Ref Range    Magnesium 2.7 (H) 1.6 - 2.4 mg/dL   METABOLIC PANEL, COMPREHENSIVE    Collection Time: 03/26/18  5:01 AM   Result Value Ref Range    Sodium 139 136 - 145 mmol/L    Potassium 3.4 (L) 3.5 - 5.1 mmol/L    Chloride 102 97 - 108 mmol/L    CO2 31 21 - 32 mmol/L    Anion gap 6 5 - 15 mmol/L    Glucose 90 65 - 100 mg/dL    BUN 25 (H) 6 - 20 MG/DL    Creatinine 0.55 0.55 - 1.02 MG/DL    BUN/Creatinine ratio 45 (H) 12 - 20      GFR est AA >60 >60 ml/min/1.73m2    GFR est non-AA >60 >60 ml/min/1.73m2    Calcium 7.7 (L) 8.5 - 10.1 MG/DL    Bilirubin, total 0.6 0.2 - 1.0 MG/DL    ALT (SGPT) 31 12 - 78 U/L    AST (SGOT) 34 15 - 37 U/L    Alk. phosphatase 75 45 - 117 U/L    Protein, total 6.2 (L) 6.4 - 8.2 g/dL    Albumin 2.3 (L) 3.5 - 5.0 g/dL    Globulin 3.9 2.0 - 4.0 g/dL    A-G Ratio 0.6 (L) 1.1 - 2.2     GLUCOSE, POC    Collection Time: 03/26/18  7:38 AM   Result Value Ref Range    Glucose (POC) 99 65 - 100 mg/dL    Performed by Jenny Washburn        Data Review images and reports reviewed    Assessment:     Active Problems:    Acute renal failure ([address] (3/24/2018)        Plan/Recommendations/Medical Decision Making:     Continue present treatment   Continue full liquid diet as bartolo until + bowel movement. Now on Miralax  Cr remarkably back to baseline 0.55 today from 3.6 2 days ago. Francisco Hillman.  Renita Medina MD, [facility]

## 2018-03-26 NOTE — PROGRESS NOTES
General Surgery End of Shift Nursing Note    Bedside shift change report given to [facility] OREN BOSE (oncoming nurse) by Maryam Garcia RN (offgoing nurse). Report included the following information SBAR, Kardex, Intake/Output, MAR, Recent Results and Cardiac Rhythm SR. Shift worked:   7p-7a   Summary of shift:     Pt given Tylenol for c/o leg pain d/t edema No nausea or abdominal pain   Issues for physician to address:   none     Number times ambulated in hallway past shift: 0    Number of times OOB to chair past shift: 0    Pain Management:  Current medication: Tylenol   Patient states pain is manageable on current pain medication: YES    GI:    Current diet:  DIET DIABETIC FULL LIQUID    Tolerating current diet: YES  Passing flatus: YES  Last Bowel Movement: several days ago   Appearance:     Respiratory:    Incentive Spirometer at bedside: YES  Patient instructed on use: YES    Patient Safety:    Falls Score: 1  Bed Alarm On? No  Sitter?  No    Cody Hand RN

## 2018-03-26 NOTE — DISCHARGE INSTRUCTIONS
HOSPITALIST DISCHARGE INSTRUCTIONS  NAME: Irma Ortiz   :  1960   MRN:  [id number]     Date/Time:  3/26/2018 2:26 PM    ADMIT DATE: 3/24/2018     DISCHARGE DATE: 3/26/2018     DIAGNOSIS:  ARF, Dehydration, Acute Encephalopathy, S/p Ventral Hernia Repair, Constipation, DM, ROQUE, HTN, Morbid Obesity. MEDICATIONS:                As per medication reconciliation    · It is important that you take the medication exactly as they are prescribed. · Keep your medication in the bottles provided by the pharmacist and keep a list of the medication names, dosages, and times to be taken in your wallet. · Do not take other medications without consulting your doctor. Pain Management: per above medications    What to do at Home    Recommended diet:  Cardiac Diet and Diabetic Diet    Recommended activity: Activity as tolerated and No driving while on analgesics    Keep Drains in place until see Surgeon from [facility], has appointment on Friday    If you experience any of the following symptoms then please call your primary care physician or return to the emergency room if you cannot get hold of your doctor:  Fever, chills, nausea, vomiting, diarrhea, change in mentation, falling, bleeding, shortness of breath. Follow Up:   PCP you are to call and set up an appointment to see them in 1 week. F/U Surgery in  [facility] obtained by :  I understand that if any problems occur once I am at home I am to contact my physician. I understand and acknowledge receipt of the instructions indicated above.                                                                                                                                            Physician's or R.N.'s Signature                                                                  Date/Time                                                                                                                                              Patient or Representative Signature                                                          Date/Time    DISCHARGE SUMMARY from Nurse    The following personal items are in your possession at time of discharge:    Dental Appliances: None  Visual Aid: None  Home Medications: None  Jewelry: None  Clothing: None (left in in pt. room)  Other Valuables: None             PATIENT INSTRUCTIONS:    While taking prescription Narcotics:  · Limit your activities  · Do not drive and operate hazardous machinery  · Do not make important personal or business decisions  · Do  not drink alcoholic beverages      Report the following to your surgeon:  · Excessive pain, swelling, redness or odor of or around the surgical area  · Temperature over 100.5  · Nausea and vomiting lasting longer than 4 hours or if unable to take medications  · Any signs of decreased circulation or nerve impairment to extremity: change in color, persistent  numbness, tingling, coldness or increase pain  · Any questions    To prevent infection remember to use good handwashing. What to do at Home:  Recommended activity: Activity as tolerated, No lifting, Driving, or Strenuous exercise for at least 2 weeks, until cleared by your surgeon and No driving for 2 weeks or until cleared by your surgeon. If you have surgical incisions you may shower. Please do not scrub incision, let water flow over area and pat dry. Do not tub bathe, get in a hot tub or swim; until your wounds have healed and given the OK by your surgeon to do so. *  Please give a list of your current medications to your Primary Care Provider. *  Please update this list whenever your medications are discontinued, doses are      changed, or new medications (including over-the-counter products) are added. *  Please carry medication information at all times in case of emergency situations.           These are general instructions for a healthy lifestyle:    No smoking/ No tobacco products/ Avoid exposure to second hand smoke    Surgeon General's Warning:  Quitting smoking now greatly reduces serious risk to your health. Obesity, smoking, and sedentary lifestyle greatly increases your risk for illness    A healthy diet, regular physical exercise & weight monitoring are important for maintaining a healthy lifestyle    You may be retaining fluid if you have a history of heart failure or if you experience any of the following symptoms:  Weight gain of 3 pounds or more overnight or 5 pounds in a week, increased swelling in our hands or feet or shortness of breath while lying flat in bed. Please call your doctor as soon as you notice any of these symptoms; do not wait until your next office visit. Recognize signs and symptoms of STROKE:    F-face looks uneven    A-arms unable to move or move unevenly    S-speech slurred or non-existent    T-time-call 911 as soon as signs and symptoms begin-DO NOT go       Back to bed or wait to see if you get better-TIME IS BRAIN. The discharge information has been reviewed with the patient. The patient verbalized understanding.

## 2018-03-26 NOTE — PROGRESS NOTES
D/C instructions reviewed with patient and family with verbalized understanding. Prescriptions given to patient for ondansetron and Miralax. Pt already has narcotic at home so percocet prescription refused and torn up and placed in shred bin.

## 2018-03-26 NOTE — DISCHARGE SUMMARY
Hospitalist Discharge Summary     Patient ID:  Gordon Chavez  [id number]  51 y.o.  1960    PCP on record: Nilay Le    Admit date: 3/24/2018  Discharge date and time: 3/26/2018      DISCHARGE DIAGNOSIS:    ARF, Dehydration, Acute Encephalopathy, S/p Ventral Hernia Repair, Constipation, DM, ROQUE, HTN, Morbid Obesity. CONSULTATIONS:  IP CONSULT TO NEPHROLOGY  IP CONSULT TO NEPHROLOGY  IP CONSULT TO GENERAL SURGERY    Excerpted HPI from H&P of Little Cason MD:  Lindsay Pacheco is a 61 y.o.  female with known history as listed below presents to ED with complaint noted above. Available records were reviewed at the time of H&P. Patient recently s/p ventral hernia repair at OSH on 3/21. She tolerated surgery well. No BM post op. Further given percocet which the family has given RTC to patient. She has been more somnolent per family report to OP ED MD at [facility]. [facility] [address] [facility]. She has continued to consume her home medications to include cartia xt, accupril, metformin, singulair, lipitor. Decreased PO intake due to somnolence. +dark urine per family. Brought to ED with the increased lethargy. Patient was able to answer questions but more sleepy. Roused easily. Not confused. Protecting airway. Labs showed ARF. She was normal preop. In ED BUN 50's with creat 3.2. UA without UTI and she was voiding. Broderick placed for possible postobstrutive picture regardless. No US on premises. [place] [id number] [address] given ARF. She did receive toradol in the ED prior to call for transfer as well as a x1L NS bolus. Upon arrival she was seen by me in room. She was sleepy but rouses easily. No family in room. She notes no pains other than soreness from her surgery. She is not getting gup much. No BM since preop. Last percocet was 1600 on 3/23. No f/c/nv/d. We were asked to admit for work up and evaluation of the above problems. ______________________________________________________________________  DISCHARGE SUMMARY/HOSPITAL COURSE:  for full details see H&P, daily progress notes, labs, consult notes. Acute renal failure/Dehydration/Pre-renal: will c/w IVF, US shows no obstruction,  Nephrology help appreciated, so far much improved, D/c  bicarbonate drip, so far resolved, back to baseline  Acute encephalopathy due to over medication (percocet) so far resolved, monitor  Recent ventral hernia repair at [facility]: Surgery help greatly appreciated, abdominal binder had been D/C no signs of compartment SD, XR some ileus, monitor, advancing diet to full liquids today. Drains are  in place as per Surgery should F/U with Surgeon (the one that did the initial surgery)  Constipation: passing gas. Moving bowels  Suspect ROQUE/OHS: monitor. Diabetes Mellitus 2 Uncontrolled, with hyperglycemia: c/w SSI,  HBA1C 6.8. Resume metformin  hypermorbid obesity, NOS patient weighs 320# - encouraged weight loss, BMI 55  Hypertension, Benign essential : c/w Cardizem, use hydralazine prn  Code status: Full  Cata Jessica [id number]  Prophylaxis: SCD's  Recommended Disposition: Home    D/c Home no needs, F/U with PCP and Surgery  _______________________________________________________________________  Patient seen and examined by me on discharge day. Pertinent Findings:  Gen:    Not in distress  Chest: Clear lungs  CVS:   Regular rhythm. No edema  Abd:  Soft, not distended, not tender, drains in place  Neuro:  Alert, GCS 15  _______________________________________________________________________  DISCHARGE MEDICATIONS:   Current Discharge Medication List      START taking these medications    Details   polyethylene glycol (MIRALAX) 17 gram packet Take 1 Packet by mouth daily. Qty: 30 Each, Refills: 1      ondansetron hcl (ZOFRAN, AS HYDROCHLORIDE,) 8 mg tablet Take 1 Tab by mouth every eight (8) hours as needed for Nausea.   Qty: 20 Tab, Refills: 0 CONTINUE these medications which have CHANGED    Details   oxyCODONE-acetaminophen (PERCOCET) 5-325 mg per tablet Take 2 Tabs by mouth every six (6) hours as needed for Pain. Max Daily Amount: 8 Tabs. Qty: 30 Tab, Refills: 0    Associated Diagnoses: S/P repair of ventral hernia         CONTINUE these medications which have NOT CHANGED    Details   atorvastatin (LIPITOR) 20 mg tablet Take 20 mg by mouth daily. calcium carbonate (CALTREX) 600 mg calcium (1,500 mg) tablet Take 600 mg by mouth daily. ascorbic acid, vitamin C, (VITAMIN C) 500 mg tablet Take 500 mg by mouth daily. montelukast (SINGULAIR) 10 mg tablet TAKE ONE TABLET BY MOUTH ONCE DAILY  Qty: 30 Tab, Refills: 5    Comments: Please consider 90 day supplies to promote better adherence  Associated Diagnoses: Bronchitis      benzonatate (TESSALON) 200 mg capsule TAKE ONE CAPSULE BY MOUTH THREE TIMES DAILY AS NEEDED FOR COUGH FOR  UP  TO  7  DAYS  Indications: Cough  Qty: 21 Cap, Refills: 2    Comments: Please consider 90 day supplies to promote better adherence  Associated Diagnoses: Mild intermittent asthma with acute exacerbation      CARTIA  mg ER capsule TAKE ONE CAPSULE BY MOUTH ONCE DAILY (PLEASE  SCHEDULE  APPOINTMENT  FOR  FURTHER  REFILLS)  Qty: 90 Cap, Refills: 0    Associated Diagnoses: Essential hypertension      albuterol (PROVENTIL HFA, VENTOLIN HFA, PROAIR HFA) 90 mcg/actuation inhaler Take 2 Puffs by inhalation every four (4) hours as needed for Wheezing or Shortness of Breath. Qty: 1 Inhaler, Refills: 5    Associated Diagnoses: Bronchitis; Mild intermittent asthma without complication      ergocalciferol (ERGOCALCIFEROL) 50,000 unit capsule Take 50,000 Units by mouth every seven (7) days.       metFORMIN (GLUCOPHAGE) 500 mg tablet TAKE ONE TABLET BY MOUTH TWICE DAILY WITH  MEALS  Qty: 60 Tab, Refills: 5    Comments: Please consider 90 day supplies to promote better adherence      ipratropium (ATROVENT) 0.06 % nasal spray 1 Munith by Both Nostrils route four (4) times daily. Indications: RHINORRHEA  Qty: 15 mL, Refills: 0    Associated Diagnoses: Acute nasopharyngitis      fluticasone-salmeterol (ADVAIR) 100-50 mcg/dose diskus inhaler Take 1 Puff by inhalation two (2) times a day. Indications: MAINTENANCE THERAPY FOR ASTHMA  Qty: 1 Inhaler, Refills: 0      fluticasone (FLONASE) 50 mcg/actuation nasal spray 2 sprays per nostril daily  Qty: 1 Bottle, Refills: 5    Associated Diagnoses: Bronchitis      ferrous sulfate 325 mg (65 mg iron) tablet Take 1 Tab by mouth Daily (before breakfast). Indications: IRON DEFICIENCY ANEMIA  Qty: 90 Tab, Refills: 1    Associated Diagnoses: Iron deficiency anemia, unspecified iron deficiency anemia type         STOP taking these medications       cephALEXin (KEFLEX) 500 mg capsule Comments:   Reason for Stopping:         quinapril (ACCUPRIL) 40 mg tablet Comments:   Reason for Stopping:               My Recommended Diet, Activity, Wound Care, and follow-up labs are listed in the patient's Discharge Insturctions which I have personally completed and reviewed.     ______________________________________________________________________    Risk of deterioration: Moderate    Condition at Discharge:  Stable  ______________________________________________________________________    Disposition  Home with family, no needs  ______________________________________________________________________    Care Plan discussed with:   Patient, RN, Care Manager, Consultant    ______________________________________________________________________    Code Status: Full Code  ______________________________________________________________________      Follow up with:   PCP : Charity Santoyo  Follow-up Information     Follow up With Details [address] [facility]  [phone]          F/U PCP  F/U Surgery in [facility]      Total time in minutes spent coordinating this discharge (includes going over instructions, follow-up, prescriptions, and preparing report for sign off to her PCP) :  35 minutes    Signed:  Jose Aly MD

## 2018-03-26 NOTE — PROGRESS NOTES
[facility]   [address] [facility], [facility] [place] [facility], [facility]  Phone: [phone]   [phone]       Nephrology Progress Note  Chong Marroquin     1960     [id number]  Date of Admission : 3/24/2018  03/26/18    CC: Follow up for JESSE       Assessment and Plan   JESSE, multifactorial.  Renal function normal after IV fluids. Metabolic acidosis, resolved    Hypokalemia     S/P abdominal ventral hernia repair 3/21/18 at OSH     DM     HTN - BP OK     Super morbid obesity        PLAN:    - d/c IV fluids. - more KCl. Recheck labs in AM.     D/w the patient and her RN     Interval History:  Looks and feels better, up in chair, not SOB. ROS: no fever/chills. No HEENT complaints. No SOB. No chest pain. Mild abd discomfort. No joint pain. No skin rashes/lesions. Review of Systems: Pertinent items are noted in HPI.     Current Medications:   Current Facility-Administered Medications   Medication Dose Route Frequency    atorvastatin (LIPITOR) tablet 20 mg  20 mg Oral DAILY    dilTIAZem CD (CARDIZEM CD) capsule 240 mg  240 mg Oral DAILY    ferrous sulfate tablet 325 mg  325 mg Oral ACB    fluticasone-vilanterol (BREO ELLIPTA) 100mcg-25mcg/puff  1 Puff Inhalation DAILY    montelukast (SINGULAIR) tablet 10 mg  10 mg Oral QHS    acetaminophen (TYLENOL) tablet 650 mg  650 mg Oral Q4H PRN    ondansetron (ZOFRAN) injection 4 mg  4 mg IntraVENous Q4H PRN    hydrALAZINE (APRESOLINE) 20 mg/mL injection 10 mg  10 mg IntraVENous Q2H PRN    polyethylene glycol (MIRALAX) packet 17 g  17 g Oral DAILY    albuterol-ipratropium (DUO-NEB) 2.5 MG-0.5 MG/3 ML  3 mL Nebulization Q2H PRN    insulin lispro (HUMALOG) injection   SubCUTAneous AC&HS    glucose chewable tablet 16 g  4 Tab Oral PRN    dextrose (D50W) injection syrg 12.5-25 g  12.5-25 g IntraVENous PRN    glucagon (GLUCAGEN) injection 1 mg  1 mg IntraMUSCular PRN    sodium chloride (NS) flush 5-10 mL  5-10 mL IntraVENous Q8H    sodium chloride (NS) flush 5-10 mL  5-10 mL IntraVENous PRN    morphine 2 mg  2 mg IntraVENous Q4H PRN      No Known Allergies    Objective:  Vitals:    Vitals:    03/26/18 0008 03/26/18 0413 03/26/18 0740 03/26/18 1105   BP: 110/79 137/64 141/61 147/66   Pulse: 75 74 70 72   Resp: 18 19 18 18   Temp: 98 °F (36.7 °C) 97.8 °F (36.6 °C) 98 °F (36.7 °C) 98.2 °F (36.8 °C)   SpO2: 95% 97% 97% 99%   Weight:       Height:         Intake and Output:  03/26 0701 - 03/26 1900  In: 6119 [I.V.:1575]  Out: 1240 [Urine:1200; Drains:40]  03/24 1901 - 03/26 0700  In: 3160.8 [P.O.:840; I.V.:2320.8]  Out: 3474 [Urine:4000; Drains:140]    Physical Examination:  Pt intubated    No  General: Morbidly obese woman in no distress. Up in a chair  Neck:  Supple, no JVD  Resp:  Lungs clear; normal resp effort  CV:  RRR; no gallop or rub  Extrem:           No edema  GI:  Soft; non-tender to light palpation; no distension  Neurologic:  Alert and appropriate; normal speech; no tremor  Psych:             Normal affect and mood  --cheerful  Skin:  No Rash; no jaundice      []    High complexity decision making was performed  []    Patient is at high-risk of decompensation with multiple organ involvement    Lab Data Personally Reviewed: I have reviewed all the pertinent labs, microbiology data and radiology studies during assessment.     Recent Labs      03/26/18   0501  03/25/18   0325  03/24/18   1712  03/24/18   0553  03/23/18   2226  03/23/18   2030   NA  139  139  134*  134*  138  137   K  3.4*  4.1  4.9  4.2  4.0  3.8   CL  102  104  103  99  98  96*   CO2  31  29  23  25  29  26   GLU  90  113*  149*  107*  124*  132*   BUN  25*  50*  66*  61*  56*  54*   CREA  0.55  1.02  2.07*  3.66*  3.27*  3.18*   CA  7.7*  8.0*  8.2*  8.1*  8.2*  8.2*   MG  2.7*  3.1*   --   2.9*   --    --    ALB  2.3*  2.5*   --    --    --   2.9*   SGOT  34  51*   --    --    --   62*   ALT  31  39   --    --    --   41     Recent Labs      03/26/18   0502 03/25/18   0325  03/24/18   0553  03/23/18   2030   WBC  7.1  11.7*  15.3*  19.7*   HGB  8.8*  8.8*  9.5*  10.1*   HCT  29.1*  28.5*  31.7*  33.6*   PLT  366  384  436*  458*     No results found for: SDES  Lab Results   Component Value Date/Time    Culture result: NO GROWTH 1 DAY 03/23/2018 11:30 PM     Recent Results (from the past 24 hour(s))   GLUCOSE, POC    Collection Time: 03/25/18 10:08 PM   Result Value Ref Range    Glucose (POC) 132 (H) 65 - 100 mg/dL    Performed by Noemy Cohen    CBC WITH AUTOMATED DIFF    Collection Time: 03/26/18  5:01 AM   Result Value Ref Range    WBC 7.1 3.6 - 11.0 K/uL    RBC 3.99 3.80 - 5.20 M/uL    HGB 8.8 (L) 11.5 - 16.0 g/dL    HCT 29.1 (L) 35.0 - 47.0 %    MCV 72.9 (L) 80.0 - 99.0 FL    MCH 22.1 (L) 26.0 - 34.0 PG    MCHC 30.2 30.0 - 36.5 g/dL    RDW 18.6 (H) 11.5 - 14.5 %    PLATELET 793 060 - 889 K/uL    MPV 9.7 8.9 - 12.9 FL    NRBC 0.7 (H) 0  WBC    ABSOLUTE NRBC 0.05 (H) 0.00 - 0.01 K/uL    NEUTROPHILS 60 32 - 75 %    LYMPHOCYTES 34 12 - 49 %    MONOCYTES 6 5 - 13 %    EOSINOPHILS 0 0 - 7 %    BASOPHILS 0 0 - 1 %    IMMATURE GRANULOCYTES 0 0.0 - 0.5 %    ABS. NEUTROPHILS 4.2 1.8 - 8.0 K/UL    ABS. LYMPHOCYTES 2.4 0.8 - 3.5 K/UL    ABS. MONOCYTES 0.4 0.0 - 1.0 K/UL    ABS. EOSINOPHILS 0.0 0.0 - 0.4 K/UL    ABS. BASOPHILS 0.0 0.0 - 0.1 K/UL    ABS. IMM.  GRANS. 0.0 0.00 - 0.04 K/UL    DF AUTOMATED     MAGNESIUM    Collection Time: 03/26/18  5:01 AM   Result Value Ref Range    Magnesium 2.7 (H) 1.6 - 2.4 mg/dL   METABOLIC PANEL, COMPREHENSIVE    Collection Time: 03/26/18  5:01 AM   Result Value Ref Range    Sodium 139 136 - 145 mmol/L    Potassium 3.4 (L) 3.5 - 5.1 mmol/L    Chloride 102 97 - 108 mmol/L    CO2 31 21 - 32 mmol/L    Anion gap 6 5 - 15 mmol/L    Glucose 90 65 - 100 mg/dL    BUN 25 (H) 6 - 20 MG/DL    Creatinine 0.55 0.55 - 1.02 MG/DL    BUN/Creatinine ratio 45 (H) 12 - 20      GFR est AA >60 >60 ml/min/1.73m2    GFR est non-AA >60 >60 ml/min/1.73m2 Calcium 7.7 (L) 8.5 - 10.1 MG/DL    Bilirubin, total 0.6 0.2 - 1.0 MG/DL    ALT (SGPT) 31 12 - 78 U/L    AST (SGOT) 34 15 - 37 U/L    Alk. phosphatase 75 45 - 117 U/L    Protein, total 6.2 (L) 6.4 - 8.2 g/dL    Albumin 2.3 (L) 3.5 - 5.0 g/dL    Globulin 3.9 2.0 - 4.0 g/dL    A-G Ratio 0.6 (L) 1.1 - 2.2     GLUCOSE, POC    Collection Time: 03/26/18  7:38 AM   Result Value Ref Range    Glucose (POC) 99 65 - 100 mg/dL    Performed by [address] [place] [address], POC    Collection Time: 03/26/18 11:08 AM   Result Value Ref Range    Glucose (POC) 106 (H) 65 - 100 mg/dL    Performed by VALOREM            Total time spent with patient:  xxx   min. Care Plan discussed with:  Patient     Family      RN      Consulting Physician [facility],         I have reviewed the flowsheets. Chart and Pertinent Notes have been reviewed. No change in PMH ,family and social history from Consult note.       Bhumi Li MD

## 2018-03-26 NOTE — PROGRESS NOTES
Pt is a 61 y.o.  female, admitted for Acute Renal Failure. Pt was alert and oriented, upon CM room visit. Pt reported that she lives in a one story home (2 steps into main entrance) with family. Pt reported that she is independent with ADLs, and she does not drive. Pt is active with PCP: seen March 2018, for physical.  Pt reported that she uses [facility]. Pt reported that he uses DME at home: walker. Pt reported home health in the past, but no SNF. Pt will have transportation home, provided by her spouse. Pt aware that her nurse will review d/c plans and needs. Care Management Interventions  PCP Verified by CM: Yes  Mode of Transport at Discharge:  Other (see comment) (pt's spouse )  Transition of Care Consult (CM Consult): Discharge Planning  Discharge Durable Medical Equipment: No  Physical Therapy Consult: Yes  Occupational Therapy Consult: Yes  Speech Therapy Consult: No  Current Support Network: Lives with Spouse, Own Home  Confirm Follow Up Transport: Family  Plan discussed with Pt/Family/Caregiver: Yes  Discharge Location  Discharge Placement: [facility]/ [facility] [address], MSW   [phone]

## 2018-04-10 DIAGNOSIS — I10 ESSENTIAL HYPERTENSION: ICD-10-CM

## 2018-04-10 RX ORDER — DILTIAZEM HYDROCHLORIDE 240 MG/1
CAPSULE, EXTENDED RELEASE ORAL
Qty: 90 CAP | Refills: 0 | Status: SHIPPED | OUTPATIENT
Start: 2018-04-10 | End: 2018-04-14

## 2018-04-14 ENCOUNTER — HOSPITAL ENCOUNTER (EMERGENCY)
Age: 58
Discharge: HOME | End: 2018-04-14
Attending: EMERGENCY MEDICINE
Payer: COMMERCIAL

## 2018-04-14 VITALS
DIASTOLIC BLOOD PRESSURE: 67 MMHG | HEART RATE: 100 BPM | RESPIRATION RATE: 16 BRPM | WEIGHT: 293 LBS | TEMPERATURE: 97.8 F | BODY MASS INDEX: 48.82 KG/M2 | OXYGEN SATURATION: 99 % | SYSTOLIC BLOOD PRESSURE: 156 MMHG | HEIGHT: 65 IN

## 2018-04-14 DIAGNOSIS — T81.40XA POSTOPERATIVE INFECTION, INITIAL ENCOUNTER: ICD-10-CM

## 2018-04-14 LAB
ALBUMIN SERPL-MCNC: 2.9 G/DL (ref 3.5–5)
ALBUMIN/GLOB SERPL: 0.6 {RATIO} (ref 1.1–2.2)
ALP SERPL-CCNC: 121 U/L (ref 45–117)
ALT SERPL-CCNC: 13 U/L (ref 12–78)
ANION GAP SERPL CALC-SCNC: 7 MMOL/L (ref 5–15)
AST SERPL-CCNC: 11 U/L (ref 15–37)
BASOPHILS # BLD: 0 K/UL (ref 0–0.1)
BASOPHILS NFR BLD: 0 % (ref 0–1)
BILIRUB SERPL-MCNC: 0.3 MG/DL (ref 0.2–1)
BUN SERPL-MCNC: 12 MG/DL (ref 6–20)
BUN/CREAT SERPL: 19 (ref 12–20)
CALCIUM SERPL-MCNC: 9.1 MG/DL (ref 8.5–10.1)
CHLORIDE SERPL-SCNC: 100 MMOL/L (ref 97–108)
CO2 SERPL-SCNC: 30 MMOL/L (ref 21–32)
CREAT SERPL-MCNC: 0.63 MG/DL (ref 0.55–1.02)
DIFFERENTIAL METHOD BLD: ABNORMAL
EOSINOPHIL # BLD: 0.2 K/UL (ref 0–0.4)
EOSINOPHIL NFR BLD: 2 % (ref 0–7)
ERYTHROCYTE [DISTWIDTH] IN BLOOD BY AUTOMATED COUNT: 18 % (ref 11.5–14.5)
GLOBULIN SER CALC-MCNC: 5 G/DL (ref 2–4)
GLUCOSE SERPL-MCNC: 106 MG/DL (ref 65–100)
HCT VFR BLD AUTO: 32 % (ref 35–47)
HGB BLD-MCNC: 9.4 G/DL (ref 11.5–16)
IMM GRANULOCYTES # BLD: 0 K/UL (ref 0–0.04)
IMM GRANULOCYTES NFR BLD AUTO: 0 % (ref 0–0.5)
LYMPHOCYTES # BLD: 1.9 K/UL (ref 0.8–3.5)
LYMPHOCYTES NFR BLD: 24 % (ref 12–49)
MCH RBC QN AUTO: 21.1 PG (ref 26–34)
MCHC RBC AUTO-ENTMCNC: 29.4 G/DL (ref 30–36.5)
MCV RBC AUTO: 71.7 FL (ref 80–99)
MONOCYTES # BLD: 0.5 K/UL (ref 0–1)
MONOCYTES NFR BLD: 7 % (ref 5–13)
NEUTS SEG # BLD: 5.3 K/UL (ref 1.8–8)
NEUTS SEG NFR BLD: 66 % (ref 32–75)
NRBC # BLD: 0 K/UL (ref 0–0.01)
NRBC BLD-RTO: 0 PER 100 WBC
PLATELET # BLD AUTO: 542 K/UL (ref 150–400)
PMV BLD AUTO: 9.8 FL (ref 8.9–12.9)
POTASSIUM SERPL-SCNC: 3.8 MMOL/L (ref 3.5–5.1)
PROT SERPL-MCNC: 7.9 G/DL (ref 6.4–8.2)
RBC # BLD AUTO: 4.46 M/UL (ref 3.8–5.2)
SODIUM SERPL-SCNC: 137 MMOL/L (ref 136–145)
WBC # BLD AUTO: 8 K/UL (ref 3.6–11)

## 2018-04-14 PROCEDURE — 96365 THER/PROPH/DIAG IV INF INIT: CPT

## 2018-04-14 PROCEDURE — 36415 COLL VENOUS BLD VENIPUNCTURE: CPT | Performed by: PHYSICIAN ASSISTANT

## 2018-04-14 PROCEDURE — 74011250636 HC RX REV CODE- 250/636: Performed by: FAMILY MEDICINE

## 2018-04-14 PROCEDURE — 74011000258 HC RX REV CODE- 258: Performed by: FAMILY MEDICINE

## 2018-04-14 PROCEDURE — 80053 COMPREHEN METABOLIC PANEL: CPT | Performed by: PHYSICIAN ASSISTANT

## 2018-04-14 PROCEDURE — 85025 COMPLETE CBC W/AUTO DIFF WBC: CPT | Performed by: PHYSICIAN ASSISTANT

## 2018-04-14 PROCEDURE — 99282 EMERGENCY DEPT VISIT SF MDM: CPT

## 2018-04-14 PROCEDURE — 99283 EMERGENCY DEPT VISIT LOW MDM: CPT

## 2018-04-14 RX ORDER — SODIUM CHLORIDE 0.9 % (FLUSH) 0.9 %
5-10 SYRINGE (ML) INJECTION EVERY 8 HOURS
Status: DISCONTINUED | OUTPATIENT
Start: 2018-04-14 | End: 2018-04-14 | Stop reason: HOSPADM

## 2018-04-14 RX ORDER — SODIUM CHLORIDE 0.9 % (FLUSH) 0.9 %
5-10 SYRINGE (ML) INJECTION AS NEEDED
Status: DISCONTINUED | OUTPATIENT
Start: 2018-04-14 | End: 2018-04-14 | Stop reason: HOSPADM

## 2018-04-14 RX ORDER — ACETAMINOPHEN 500 MG
1000 TABLET ORAL
COMMUNITY

## 2018-04-14 RX ADMIN — CEFTRIAXONE 1 G: 1 INJECTION, POWDER, FOR SOLUTION INTRAMUSCULAR; INTRAVENOUS at 18:11

## 2018-04-14 NOTE — PROGRESS NOTES
Pharmacy Clarification of Prior to Admission Medication Regimen     The patient was interviewed regarding clarification of the prior to admission medication regimen. Patient's sister was present in room and obtained permission from patient to discuss drug regimen with visitor(s) present. Patient was questioned regarding use of any other inhalers, topical products, over the counter medications, herbal medications, vitamin products or ophthalmic/nasal/otic medication use. Information Obtained From: Rx Query and patient    Pertinent Pharmacy Findings:   ciprofloxacin HCl (CIPRO) 500 mg tablet: Patient started a 7 day regimen on 04/14/2018. Patient has completed 1 dose of therapy as of 04/14/2018. Antibiotic Indication: \"Infection\"   metroNIDAZOLE (FLAGYL) 500 mg tablet: Patient started a 10 day regimen on 04/14/2018. Patient has completed 1 dose of therapy as of 04/14/2018. Antibiotic Indication: \"Infection\"      PTA medication list was corrected to the following:     Prior to Admission Medications   Prescriptions Last Dose Informant Patient Reported? Taking? CHOLECALCIFEROL, VITAMIN D3, (VITAMIN D3 PO) 4/14/2018 at Unknown time Self Yes Yes   Sig: Take 1 Tab by mouth daily. acetaminophen (TYLENOL EXTRA STRENGTH) 500 mg tablet 4/7/2018 at Unknown time Self Yes Yes   Sig: Take 1,000 mg by mouth every six (6) hours as needed for Pain. albuterol (PROVENTIL HFA, VENTOLIN HFA, PROAIR HFA) 90 mcg/actuation inhaler 3/31/2018 at Unknown time Self No Yes   Sig: Take 2 Puffs by inhalation every four (4) hours as needed for Wheezing or Shortness of Breath. ascorbic acid, vitamin C, (VITAMIN C) 500 mg tablet 4/14/2018 at Unknown time Self Yes Yes   Sig: Take 500 mg by mouth daily. atorvastatin (LIPITOR) 20 mg tablet 4/13/2018 at Unknown time Self Yes Yes   Sig: Take 20 mg by mouth nightly.    ciprofloxacin HCl (CIPRO) 500 mg tablet 4/14/2018 at Unknown time Self No Yes   Sig: Take 1 Tab by mouth two (2) times a day for 7 days. ferrous sulfate 325 mg (65 mg iron) tablet 4/14/2018 at Unknown time Self No Yes   Sig: Take 1 Tab by mouth Daily (before breakfast). Indications: IRON DEFICIENCY ANEMIA   metFORMIN (GLUCOPHAGE) 500 mg tablet 4/13/2018 at Unknown time Self No Yes   Sig: TAKE ONE TABLET BY MOUTH TWICE DAILY WITH  MEALS   metroNIDAZOLE (FLAGYL) 500 mg tablet 4/14/2018 at Unknown time Self No Yes   Sig: Take 1 Tab by mouth three (3) times daily for 10 days.    montelukast (SINGULAIR) 10 mg tablet 4/14/2018 at Unknown time Self No Yes   Sig: TAKE ONE TABLET BY MOUTH ONCE DAILY      Facility-Administered Medications: None          Thank you,  Keena Encarnacion CPhT  Medication History Pharmacy Technician

## 2018-04-14 NOTE — ED NOTES
MD has reviewed discharge instructions with the patient and caregiver. The patient and caregiver verbalized understanding. Patient discharged home via wheelchair with sister.

## 2018-04-14 NOTE — ED PROVIDER NOTES
EMERGENCY DEPARTMENT HISTORY AND PHYSICAL EXAM      Date: 4/14/2018  Patient Name: Panda Barker    I have seen and evaluated this patient in the Express Care portion of triage for worsening drainage from a surgical hernia repair site (performed 3/21/18 by Dr Georgi Lees). The patients care will begin now and orders have been placed. This patient will be seen and provided further care in the Emergency Room. Written by Valarie Cooper, a scribe for David Escobar PA-C. History of Presenting Illness     Chief Complaint   Patient presents with    Abdominal Pain     patient states that she had hernia repair surgery 03/21/2018 and is not improving. patient complain of abdominal pain at surgical site.gauze dressing with small amount os brown drainage. History Provided By: Patient    HPI: Panda Barker, 61 y.o. female with PMHx significant for HTN, DM, and anemia, presents ambulatory to the ED for evaluation of gradually worsening, mild to moderate LLQ pain at surgical site with associated redness and yellow purulent drainage s/p ventral hernia repair 3/21/2018. Since pt's surgery she states she has seen their NP, however has not been evaluated by the surgeon personally. Pt states she f/u with her PCP two days ago and was started on clindamycin for current sx's. Chart review reveals pt was seen at [facility] yesterday for the same with normal WBC and surgical consult concerned for seroma. She presents today as the pain has worsened and the wound continues to drain. She specifically denies any fevers, chills, nausea, vomiting, chest pain, shortness of breath, headache, diarrhea, sweating or weight loss. PCP: Giuseppe Verduzco    There are no other complaints, changes, or physical findings at this time. Current Outpatient Prescriptions   Medication Sig Dispense Refill    CHOLECALCIFEROL, VITAMIN D3, (VITAMIN D3 PO) Take 1 Tab by mouth daily.       acetaminophen (TYLENOL EXTRA STRENGTH) 500 mg tablet Take 1,000 mg by mouth every six (6) hours as needed for Pain.  ciprofloxacin HCl (CIPRO) 500 mg tablet Take 1 Tab by mouth two (2) times a day for 7 days. 14 Tab 0    metroNIDAZOLE (FLAGYL) 500 mg tablet Take 1 Tab by mouth three (3) times daily for 10 days. 30 Tab 0    atorvastatin (LIPITOR) 20 mg tablet Take 20 mg by mouth nightly.  ascorbic acid, vitamin C, (VITAMIN C) 500 mg tablet Take 500 mg by mouth daily.  montelukast (SINGULAIR) 10 mg tablet TAKE ONE TABLET BY MOUTH ONCE DAILY 30 Tab 5    metFORMIN (GLUCOPHAGE) 500 mg tablet TAKE ONE TABLET BY MOUTH TWICE DAILY WITH  MEALS 60 Tab 5    ferrous sulfate 325 mg (65 mg iron) tablet Take 1 Tab by mouth Daily (before breakfast). Indications: IRON DEFICIENCY ANEMIA 90 Tab 1    albuterol (PROVENTIL HFA, VENTOLIN HFA, PROAIR HFA) 90 mcg/actuation inhaler Take 2 Puffs by inhalation every four (4) hours as needed for Wheezing or Shortness of Breath. 1 Inhaler 5       Past History     Past Medical History:  Past Medical History:   Diagnosis Date    Anemia     Diabetes mellitus type II     Hypertension     Menopause        Past Surgical History:  Past Surgical History:   Procedure Laterality Date    HX GI  2006     bowel surgery for infection s/p colostomy and then removal     HX PELVIC LAPAROSCOPY         Family History:  Family History   Problem Relation Age of Onset    Diabetes Mother     Breast Cancer Mother     Hypertension Sister     Breast Cancer Sister     Diabetes Sister        Social History:  Social History   Substance Use Topics    Smoking status: Former Smoker    Smokeless tobacco: Never Used    Alcohol use No       Allergies:  No Known Allergies    Review of Systems   Review of Systems   Constitutional: Negative. Negative for activity change, appetite change, chills, diaphoresis, fatigue, fever and unexpected weight change. HENT: Negative. Negative for congestion, hearing loss, rhinorrhea, sneezing and voice change.     Eyes: Negative. Negative for pain and visual disturbance. Respiratory: Negative. Negative for apnea, cough, choking, chest tightness and shortness of breath. Cardiovascular: Negative for chest pain and palpitations. Gastrointestinal: Positive for abdominal pain. Negative for abdominal distention, blood in stool, diarrhea, nausea and vomiting. Genitourinary: Negative. Negative for difficulty urinating, flank pain, frequency and urgency. No discharge   Musculoskeletal: Negative. Negative for arthralgias, back pain, myalgias and neck stiffness. Skin: Positive for wound. Negative for color change. Neurological: Negative. Negative for dizziness, seizures, syncope, speech difficulty, weakness, numbness and headaches. Hematological: Negative for adenopathy. Psychiatric/Behavioral: Negative. Negative for agitation, behavioral problems, dysphoric mood and suicidal ideas. The patient is not nervous/anxious. Physical Exam   Physical Exam   Constitutional: She is oriented to person, place, and time. She appears well-developed and well-nourished. No distress. HENT:   Head: Normocephalic and atraumatic. Mouth/Throat: Oropharynx is clear and moist. No oropharyngeal exudate. Eyes: Conjunctivae and EOM are normal. Pupils are equal, round, and reactive to light. Right eye exhibits no discharge. Left eye exhibits no discharge. Neck: Normal range of motion. Neck supple. Cardiovascular: Normal rate, regular rhythm and intact distal pulses. Exam reveals no gallop and no friction rub. No murmur heard. Pulmonary/Chest: Effort normal and breath sounds normal. No respiratory distress. She has no wheezes. She has no rales. She exhibits no tenderness. Abdominal: Soft. Bowel sounds are normal. She exhibits no distension and no mass. There is no tenderness. There is no rebound and no guarding. Musculoskeletal: Normal range of motion. She exhibits no edema.    Lymphadenopathy:     She has no cervical adenopathy. Neurological: She is alert and oriented to person, place, and time. No cranial nerve deficit. Coordination normal.   Skin: Skin is warm. Large postoperative wound with induration and cellulitis to the LLQ with some denuded skin, able to express purulent drainage. Midline abdominal scar is C/D/I. Psychiatric: She has a normal mood and affect. Nursing note and vitals reviewed. Diagnostic Study Results     Labs -     Recent Results (from the past 12 hour(s))   CBC WITH AUTOMATED DIFF    Collection Time: 04/14/18  3:13 PM   Result Value Ref Range    WBC 8.0 3.6 - 11.0 K/uL    RBC 4.46 3.80 - 5.20 M/uL    HGB 9.4 (L) 11.5 - 16.0 g/dL    HCT 32.0 (L) 35.0 - 47.0 %    MCV 71.7 (L) 80.0 - 99.0 FL    MCH 21.1 (L) 26.0 - 34.0 PG    MCHC 29.4 (L) 30.0 - 36.5 g/dL    RDW 18.0 (H) 11.5 - 14.5 %    PLATELET 898 (H) 998 - 400 K/uL    MPV 9.8 8.9 - 12.9 FL    NRBC 0.0 0  WBC    ABSOLUTE NRBC 0.00 0.00 - 0.01 K/uL    NEUTROPHILS 66 32 - 75 %    LYMPHOCYTES 24 12 - 49 %    MONOCYTES 7 5 - 13 %    EOSINOPHILS 2 0 - 7 %    BASOPHILS 0 0 - 1 %    IMMATURE GRANULOCYTES 0 0.0 - 0.5 %    ABS. NEUTROPHILS 5.3 1.8 - 8.0 K/UL    ABS. LYMPHOCYTES 1.9 0.8 - 3.5 K/UL    ABS. MONOCYTES 0.5 0.0 - 1.0 K/UL    ABS. EOSINOPHILS 0.2 0.0 - 0.4 K/UL    ABS. BASOPHILS 0.0 0.0 - 0.1 K/UL    ABS. IMM.  GRANS. 0.0 0.00 - 0.04 K/UL    DF AUTOMATED     METABOLIC PANEL, COMPREHENSIVE    Collection Time: 04/14/18  3:13 PM   Result Value Ref Range    Sodium 137 136 - 145 mmol/L    Potassium 3.8 3.5 - 5.1 mmol/L    Chloride 100 97 - 108 mmol/L    CO2 30 21 - 32 mmol/L    Anion gap 7 5 - 15 mmol/L    Glucose 106 (H) 65 - 100 mg/dL    BUN 12 6 - 20 MG/DL    Creatinine 0.63 0.55 - 1.02 MG/DL    BUN/Creatinine ratio 19 12 - 20      GFR est AA >60 >60 ml/min/1.73m2    GFR est non-AA >60 >60 ml/min/1.73m2    Calcium 9.1 8.5 - 10.1 MG/DL    Bilirubin, total 0.3 0.2 - 1.0 MG/DL    ALT (SGPT) 13 12 - 78 U/L    AST (SGOT) 11 (L) 15 - 37 U/L    Alk. phosphatase 121 (H) 45 - 117 U/L    Protein, total 7.9 6.4 - 8.2 g/dL    Albumin 2.9 (L) 3.5 - 5.0 g/dL    Globulin 5.0 (H) 2.0 - 4.0 g/dL    A-G Ratio 0.6 (L) 1.1 - 2.2         Radiologic Studies -   No orders to display     CT Results  (Last 48 hours)               04/13/18 1920  CT ABD PELV W CONT Final result    Impression:  IMPRESSION: Abscess in the abdominal wall left lower quadrant. Narrative:  INDICATION: Abscess, abdomen, soft tissue        EXAM: CT Abdomen and CT Pelvis is performed with 100 mL Isovue 370 contrast IV   without complication. CT dose reduction was achieved through use of a   standardized protocol tailored for this examination and automatic exposure   control for dose modulation. FINDINGS:        There is a left lower quadrant abdominal wall subcutaneous fluid collection   containing gas bubbles compatible with abscess, with no intraperitoneal   extension, measuring approximately 11 x 5 x 4 cm. This does not communicate with   2 subcutaneous small fluid collections at the umbilicus. There is no apparent   abdominal wall hernia. There is no intra-abdominal inflammation, ascites, pneumoperitoneum or   significant adenopathy. Liver shows no significant finding. Bile ducts are not   enlarged. Pancreas shows no mass or inflammation. Spleen is unremarkable. Adrenal glands are normal in size. Kidneys show no mass or hydronephrosis. Aorta   is without aneurysm. Appendix is not seen. The bladder is not distended. The distal ureters are not   dilated. There is no apparent pelvic mass. Lung bases show a pericardial effusion small to moderate unchanged since   10/27/2017. Medical Decision Making   I am the first provider for this patient. I reviewed the vital signs, available nursing notes, past medical history, past surgical history, family history and social history. Vital Signs-Reviewed the patient's vital signs.   Patient Vitals for the past 12 hrs:   Temp Pulse Resp BP SpO2   04/14/18 1845 - - - 156/67 99 %   04/14/18 1830 - - - 158/71 100 %   04/14/18 1815 - - - 144/75 100 %   04/14/18 1812 - - - 119/72 100 %   04/14/18 1745 - - - 148/63 100 %   04/14/18 1730 - - - 155/62 99 %   04/14/18 1700 - - - 153/75 100 %   04/14/18 1645 - - - 142/68 100 %   04/14/18 1411 97.8 °F (36.6 °C) 100 16 165/80 97 %       Pulse Oximetry Analysis - 97% on RA    Cardiac Monitor:   Rate: 100 bpm  Rhythm: Normal Sinus Rhythm      Records Reviewed: Nursing Notes, Old Medical Records, Previous Radiology Studies and Previous Laboratory Studies    Provider Notes (Medical Decision Making):     DDx: cellulitis versus abscess versus seroma    ED Course:   Initial assessment performed. The patients presenting problems have been discussed, and they are in agreement with the care plan formulated and outlined with them. I have encouraged them to ask questions as they arise throughout their visit. CONSULT NOTE:   4:00 PM  Luis A Cooney MD spoke with Yony Treviño MD   Specialty: general surgery  Discussed pt's hx, disposition, and available diagnostic and imaging results. Reviewed care plans. Consultant agrees with plans as outlined. Dr. Dash Frazier will evaluate pt. Written by WANDER Loera, as dictated by Malissa Rae. Cheo Cooney MD.    4:20 PM  I have just reevaluated the patient. I have reviewed Her vital signs and determined there is currently no worsening in their condition or physical exam.  Results have been reviewed with them and their questions have been answered. We will continue to review further results as they come available. PROGRESS NOTE:  5:30 PM  Dr. Dash Frazier has evaluated pt. He recommends continuing Cipro and Flagyl as well as a dry dressing to cover the site. He has advised pt to wash the area with soap and water TID. F/u with her surgeon. Written by WANDER Loera, as dictated by Malissa Rae.  Cheo Cooney MD.    Disposition:    6:52 PM  Conception Bal  results have been reviewed with her. She has been counseled regarding her diagnosis. She verbally conveys understanding and agreement of the signs, symptoms, diagnosis, treatment and prognosis and additionally agrees to follow up as recommended with Dr. Jennifer Bellamy in 24 - 48 hours. She also agrees with the care-plan and conveys that all of her questions have been answered. I have also put together some discharge instructions for her that include: 1) educational information regarding their diagnosis, 2) how to care for their diagnosis at home, as well a 3) list of reasons why they would want to return to the ED prior to their follow-up appointment, should their condition change. PLAN:  1. Discharge home   2. Follow-up Information     Follow up With Details Comments Contact Info    your surgeon In 2 days          Return to ED if worse     Diagnosis     Clinical Impression:   1. Postoperative wound infection, initial encounter    2. Postoperative infection, initial encounter        Attestations: This note is prepared by Florence Christiansen, acting as Scribe for [employer] Shelby Gu, [address] Shelby Gu MD: The scribe's documentation has been prepared under my direction and personally reviewed by me in its entirety. I confirm that the note above accurately reflects all work, treatment, procedures, and medical decision making performed by me. This note will not be viewable in [address]

## 2018-04-14 NOTE — CONSULTS
Surgery      Surgical consult requested in this 60 yo obese female with diabets who had a hernia repair by a Surgeon at [address] in March of his year. Carrie was admitted to this facility several days postop. She then saw the NP at her surgeon's office and had clips removed from the wound and a drain removed about 12 days ago. On this past Thursday she reports the surgical site was red and noticed some drainage. Anya went to her primary care physician and was started on Clindamycin po. The drainage continued and she went to the ER at [address] [place] [address]. CT scan showed: \"There is a left lower quadrant abdominal wall subcutaneous fluid collection  containing gas bubbles compatible with abscess, with no intraperitoneal  extension, measuring approximately 11 x 5 x 4 cm. \"  She was given IV rocephin, and sent home on Cipro and flagyl to follow with her surgeon. Drainage continued so she now come to our ER. WBC is 8.0  (last night it was 8.1)  Gluc 106  Afeb    On exam th patient has erythema surrounding the surgical sit in the LLQ, appear lss as there are some skin markings. No fluctuance is appreciated at this time  Scant amount of drainage. A/P Infected surgical site which is/has drained on it's own. Does not appear to have evidence of systemic illness    Suggest: one dose of IV rocephine                  Cont Cipro and Flagyl                  Dry dressing to cover surgical site, wash area with soap and water TID and with dressing changes. May shower. Follow up early this week with own Surgeon or if she would like to change Surgeon's she can call the office of [facility] for follow [phone]    If develops fevers, chills, or systemic symptoms should return to ER. Deanna Catalan MD, [facility]

## 2018-04-14 NOTE — DISCHARGE INSTRUCTIONS
Infection After Surgery: Care Instructions  Your Care Instructions  After surgery, an infection is always possible. It doesn't mean that the surgery didn't go well. Because an infection can be serious, your doctor has taken steps to manage it. Your doctor checked the infection and cleaned it if necessary. He or she may have made an opening in the area so that the pus can drain out. You may have gauze in the cut so that the area will stay open and keep draining. You may need antibiotics. You will need to follow up with your doctor to make sure the infection has gone away. Follow-up care is a key part of your treatment and safety. Be sure to make and go to all appointments, and call your doctor if you are having problems. It's also a good idea to know your test results and keep a list of the medicines you take. How can you care for yourself at home? · Make sure your surgeon knows that you saw a doctor about the infection. · If your doctor prescribed antibiotics, take them as directed. Do not stop taking them just because you feel better. You need to take the full course of antibiotics. · Ask your doctor if you can take an over-the-counter pain medicine, such as acetaminophen (Tylenol), ibuprofen (Advil, Motrin), or naproxen (Aleve). Be safe with medicines. Read and follow all instructions on the label. · Do not take two or more pain medicines at the same time unless the doctor told you to. Many pain medicines have acetaminophen, which is Tylenol. Too much acetaminophen (Tylenol) can be harmful. · Prop up the area on a pillow anytime you sit or lie down during the next 3 days. Try to keep it above the level of your heart. This will help reduce swelling. · Keep the skin clean and dry. · If you have a bandage, keep it clean and dry. · You may have a dressing over the cut (incision). A dressing helps the incision heal and protects it. Your doctor will tell you how to take care of this.  You can expect drainage from the wound. · If your doctor told you how to care for your incision, follow your doctor's instructions. If you did not get instructions, follow this general advice:  ¨ Wash around the incision with clean water 2 times a day. Don't use hydrogen peroxide or alcohol, which can slow healing. When should you call for help? Call your doctor now or seek immediate medical care if:  ? · You have signs that your infection is getting worse, such as:  ¨ Increased pain, swelling, warmth, or redness in the area. ¨ Red streaks leading from the area. ¨ Pus draining from the wound. ¨ A new or higher fever. ? Watch closely for changes in your health, and be sure to contact your doctor if you have any problems. Where can you learn more? Go to http://luna-catrina.info/. Enter C340 in the search box to learn more about \"Infection After Surgery: Care Instructions. \"  Current as of: March 20, 2017  Content Version: 11.4  © 1528-5828 [employer]. Care instructions adapted under license by [employer] (which disclaims liability or warranty for this information). If you have questions about a medical condition or this instruction, always ask your healthcare professional. [address] any warranty or liability for your use of this information.

## 2018-04-18 ENCOUNTER — OFFICE VISIT (OUTPATIENT)
Dept: SURGERY | Age: 58
End: 2018-04-18

## 2018-04-18 VITALS
HEIGHT: 60 IN | BODY MASS INDEX: 57.52 KG/M2 | SYSTOLIC BLOOD PRESSURE: 160 MMHG | TEMPERATURE: 98.3 F | WEIGHT: 293 LBS | OXYGEN SATURATION: 95 % | HEART RATE: 92 BPM | DIASTOLIC BLOOD PRESSURE: 84 MMHG

## 2018-04-18 DIAGNOSIS — L76.34 POSTOPERATIVE SEROMA OF SUBCUTANEOUS TISSUE AFTER NON-DERMATOLOGIC PROCEDURE: Primary | ICD-10-CM

## 2018-04-18 DIAGNOSIS — Z87.19 S/P REPAIR OF VENTRAL HERNIA: ICD-10-CM

## 2018-04-18 DIAGNOSIS — Z98.890 S/P REPAIR OF VENTRAL HERNIA: ICD-10-CM

## 2018-04-18 NOTE — MR AVS SNAPSHOT
[address], [address] [facility], [address] 
[phone] Patient: Asya Alberto MRN: [id number] :[id number] Visit Information Date & Time Provider Department Dept. Phone Encounter #  
 2018  8:20 AM Rosalyn Angelucci, MD [facility] [address]. Osman [facility] [phone] [id number] Upcoming Health Maintenance Date Due Hepatitis C Screening 1960 EYE EXAM RETINAL OR DILATED Q1 1969 Pneumococcal 19-64 Highest Risk (1 of 3 - PCV13) 1978 DTaP/Tdap/Td series (1 - Tdap) 1980 PAP AKA CERVICAL CYTOLOGY 1980 FOBT Q 1 YEAR AGE 50-75 2017 HEMOGLOBIN A1C Q6M 2018 MICROALBUMIN Q1 2018 LIPID PANEL Q1 2018 FOOT EXAM Q1 2018 BREAST CANCER SCRN MAMMOGRAM 2020 Allergies as of 2018  Review Complete On: 2018 By: Yasmeen Russell LPN No Known Allergies Current Immunizations  Never Reviewed Name Date Influenza Vaccine (Quad) PF 10/27/2017  5:44 PM  
  
 Not reviewed this visit You Were Diagnosed With   
  
 Codes Comments S/P repair of ventral hernia    -  Primary ICD-10-CM: Z98.890, Z87.19 ICD-9-CM: V45.89 Vitals BP Pulse Temp Height(growth percentile) Weight(growth percentile) SpO2  
 160/84 (BP 1 Location: Right arm, BP Patient Position: Sitting) 92 98.3 °F (36.8 °C) 5' (1.524 m) 308 lb (139.7 kg) 95% BMI OB Status Smoking Status 60.15 kg/m2 Postmenopausal Former Smoker BMI and BSA Data Body Mass Index Body Surface Area  
 60.15 kg/m 2 2.43 m 2 Preferred Pharmacy Pharmacy Name Phone [address] [phone] Your Updated Medication List  
  
   
This list is accurate as of 18  9:10 AM.  Always use your most recent med list.  
  
  
  
  
 albuterol 90 mcg/actuation inhaler Commonly known as:  PROVENTIL HFA, VENTOLIN HFA, PROAIR HFA Take 2 Puffs by inhalation every four (4) hours as needed for Wheezing or Shortness of Breath. atorvastatin 20 mg tablet Commonly known as:  LIPITOR Take 20 mg by mouth nightly. ciprofloxacin HCl 500 mg tablet Commonly known as:  CIPRO Take 1 Tab by mouth two (2) times a day for 7 days. ferrous sulfate 325 mg (65 mg iron) tablet Take 1 Tab by mouth Daily (before breakfast). Indications: IRON DEFICIENCY ANEMIA  
  
 metFORMIN 500 mg tablet Commonly known as:  GLUCOPHAGE  
TAKE ONE TABLET BY MOUTH TWICE DAILY WITH  MEALS  
  
 metroNIDAZOLE 500 mg tablet Commonly known as:  FLAGYL Take 1 Tab by mouth three (3) times daily for 10 days. montelukast 10 mg tablet Commonly known as:  SINGULAIR  
TAKE ONE TABLET BY MOUTH ONCE DAILY  
  
 TYLENOL EXTRA STRENGTH 500 mg tablet Generic drug:  acetaminophen Take 1,000 mg by mouth every six (6) hours as needed for Pain. VITAMIN C 500 mg tablet Generic drug:  ascorbic acid (vitamin C) Take 500 mg by mouth daily. VITAMIN D3 PO Take 1 Tab by mouth daily. We Performed the Following AEROBIC BACTERIAL CULTURE [id number] CPT(R)] Introducing [facility]! Siva Franklin introduces [employer] patient portal. Now you can access parts of your medical record, email your doctor's office, and request medication refills online. 1. In your internet browser, go to [email or web address] 2. Click on the First Time User? Click Here link in the Sign In box. You will see the New Member Sign Up page. 3. Enter your [employer] Access Code exactly as it appears below. You will not need to use this code after youve completed the sign-up process. If you do not sign up before the expiration date, you must request a new code. · [employer] Access Code: [email or web address] Expires: 5/5/2018 10:48 PM 
 
 4. Enter the last four digits of your Social Security Number (xxxx) and Date of Birth (mm/dd/yyyy) as indicated and click Submit. You will be taken to the next sign-up page. 5. Create a [employer] ID. This will be your [employer] login ID and cannot be changed, so think of one that is secure and easy to remember. 6. Create a [employer] password. You can change your password at any time. 7. Enter your Password Reset Question and Answer. This can be used at a later time if you forget your password. 8. Enter your e-mail address. You will receive e-mail notification when new information is available in [address] 9. Click Sign Up. You can now view and download portions of your medical record. 10. Click the Download Summary menu link to download a portable copy of your medical information. If you have questions, please visit the Frequently Asked Questions section of the [employer] website. Remember, [employer] is NOT to be used for urgent needs. For medical emergencies, dial 911. Now available from your iPhone and Android! Please provide this summary of care documentation to your next provider. Your primary care clinician is listed as Roseann Jorge. If you have any questions after today's visit, please call [phone].

## 2018-04-18 NOTE — PROGRESS NOTES
1. Have you been to the ER, urgent care clinic since your last visit? Hospitalized since your last visit? New patient2. Have you seen or consulted any other health care providers outside of the [address] [place] [address] since your last visit? Include any pap smears or colon screening. New patient. Does not have advanced directive.

## 2018-04-18 NOTE — PROGRESS NOTES
The patient is a 61 y.o. woman who presents for a 2nd opinion regarding a surgical site. The patient had repair of apparently a complex ventral hernia begun with laparoscopy and completed as an open procedure on 3/21/18 by Dr. Valeria Carrera at [facility]. The patient was admitted to [address] [place] [address] on 3/24/18 with acute renal insufficiency which resolved over the next few days. She was discharged on 3/26/18. The patient was then seen in the ER at [address] [place] [address] on 4/13/18 with complaint of pain at the left lower quadrant incision site which had been the site of previous closure of colostomy. CT scan at that time showed a fluid collection at the site with both liquid and gas. ER attending notes indicate there was communication with the patient's surgeon's office who felt this was likely a seroma. She did not have an elevated white blood cell count at that time. The patient reports that a few days ago the site began spontaneously draining which she describes as large amounts of fluid. The area is mildly painful. She has had no fever. I reviewed the patient's CT images from her recent ER visit. On exam today, the patient is afebrile. Visit Vitals    /84 (BP 1 Location: Right arm, BP Patient Position: Sitting)    Pulse 92    Temp 98.3 °F (36.8 °C)    Ht 5' (1.524 m)    Wt 139.7 kg (308 lb)    SpO2 95%    BMI 60.15 kg/m2   The patient is very obese. She is in no distress. Examination of the abdomen reveals induration and mild erythema at the left lower quadrant site. Midline scar is healing well. There is a central area at the left lower quadrant incision which is a thin bulla. The bulla was opened with a sterile swab and around 50-75 cc's of cloudy serous fluid came out; this was cultured. A dry dressing was applied.     I spoke to staff members at Dr. Fly Hayes's office and they made arrangements for the patient to be followed up by Dr. Brandi Olguin in their office tomorrow at 10:00 AM. I explained to the patient that I thought it would be appropriate for this issue to be addressed by her operating surgeon. The patient has a disc containing CT images and I have asked her to take that to the office for her visit with Dr. Manjit Escalera. Final Diagnosis:  Post operative seroma at left lower quadrant incision.      MedDI/inna     cc: MD Marquise Shankar PA

## 2018-04-22 LAB — BACTERIA SPEC AEROBE CULT: NORMAL

## 2021-08-08 ENCOUNTER — HOSPITAL ENCOUNTER (EMERGENCY)
Age: 61
Discharge: HOME | End: 2021-08-08
Attending: EMERGENCY MEDICINE

## 2021-08-08 VITALS
BODY MASS INDEX: 48.82 KG/M2 | OXYGEN SATURATION: 97 % | RESPIRATION RATE: 18 BRPM | TEMPERATURE: 99.7 F | HEART RATE: 88 BPM | HEIGHT: 65 IN | SYSTOLIC BLOOD PRESSURE: 181 MMHG | DIASTOLIC BLOOD PRESSURE: 74 MMHG | WEIGHT: 293 LBS

## 2021-08-08 DIAGNOSIS — L30.8 OTHER ECZEMA: Primary | ICD-10-CM

## 2021-08-08 PROCEDURE — 99282 EMERGENCY DEPT VISIT SF MDM: CPT

## 2021-08-08 RX ORDER — TRIAMCINOLONE ACETONIDE 1 MG/G
CREAM TOPICAL 2 TIMES DAILY
Qty: 15 G | Refills: 0 | Status: ON HOLD | OUTPATIENT
Start: 2021-08-08

## 2021-08-08 NOTE — ED TRIAGE NOTES
Bilateral leg pain x 2 months, seen by PCP and referred to dermatology for removal of skin tag. Pt c/o irritation on anterior aspect of both shins, no fevers or chills, no drainage.

## 2021-08-08 NOTE — ED PROVIDER NOTES
[facility]  EMERGENCY DEPARTMENT HISTORY AND PHYSICAL EXAM         Date of Service: 8/8/2021   Patient Name: Anjel Farias   YOB: 1960  Medical Record Number: [id number]    History of Presenting Illness     Chief Complaint   Patient presents with    Skin Infection        History Provided By:  patient    Additional History:   Anjel Farias is a 63 y.o. female who presents ambulatory to the ED with cc of itchy patch of skin on the left shin that she has had for months. She states she has seen her PCP for it but it isn't better. She is unable to tell me what treatment she has had. No F/C, drainage. There are no other complaints, changes or physical findings at this time. Primary Care Provider: Antwon Rao MD   Specialist:    Past History     Past Medical History:   Past Medical History:   Diagnosis Date    Anemia     Diabetes mellitus type II     Hypertension     Menopause         Past Surgical History:   Past Surgical History:   Procedure Laterality Date    HX GI  2006     bowel surgery for infection s/p colostomy and then removal     HX PELVIC LAPAROSCOPY          Family History:   Family History   Problem Relation Age of Onset    Diabetes Mother     Breast Cancer Mother    [facility] Hypertension Mother     Hypertension Sister     Breast Cancer Sister     Diabetes Sister     Heart Disease Father     Hypertension Father     Stroke Maternal Grandfather     Stroke Paternal Grandfather     Breast Cancer Paternal Aunt     Breast Cancer Paternal Aunt         Social History:   Social History     Tobacco Use    Smoking status: Former Smoker    Smokeless tobacco: Never Used   Substance Use Topics    Alcohol use: No    Drug use: No        Allergies:   No Known Allergies     Review of Systems   Review of Systems   Constitutional: Negative for appetite change, chills and fever. HENT: Negative for congestion.     Eyes: Negative for visual disturbance. Respiratory: Negative for cough, shortness of breath and wheezing. Cardiovascular: Negative for chest pain, palpitations and leg swelling. Gastrointestinal: Negative for abdominal pain. Genitourinary: Negative for dysuria, frequency and urgency. Musculoskeletal: Negative for back pain, joint swelling, myalgias and neck stiffness. Skin: Positive for rash. Neurological: Negative for dizziness, syncope, weakness and headaches. Hematological: Negative for adenopathy. Psychiatric/Behavioral: Negative for behavioral problems and dysphoric mood. Physical Exam  Physical Exam  Vitals and nursing note reviewed. Constitutional:       General: She is not in acute distress. Appearance: She is well-developed. HENT:      Head: Normocephalic and atraumatic. Eyes:      General: No scleral icterus. Conjunctiva/sclera: Conjunctivae normal.      Pupils: Pupils are equal, round, and reactive to light. Cardiovascular:      Rate and Rhythm: Normal rate and regular rhythm. Heart sounds: No murmur heard. No gallop. Pulmonary:      Effort: Pulmonary effort is normal. No respiratory distress. Breath sounds: No stridor. No wheezing or rales. Abdominal:      General: Bowel sounds are normal. There is no distension. Palpations: Abdomen is soft. There is no mass. Tenderness: There is no abdominal tenderness. There is no guarding or rebound. Musculoskeletal:         General: Normal range of motion. Cervical back: Normal range of motion and neck supple. Lymphadenopathy:      Cervical: No cervical adenopathy. Skin:     General: Skin is warm and dry. Findings: Rash present. No erythema. Comments: Excoriated but non infected patch of eczematous skin on anterior mid left shin. Neurological:      Mental Status: She is alert and oriented to person, place, and time. Cranial Nerves: No cranial nerve deficit.       Coordination: Coordination normal. Medical Decision Making   I am the first provider for this patient. I reviewed the vital signs, available nursing notes, past medical history, past surgical history, family history and social history. Old Medical Records: PCP notes        ED Course:  5:01 PM   Initial assessment performed. The patients presenting problems have been discussed, and they are in agreement with the care plan formulated and outlined with them. I have encouraged them to ask questions as they arise throughout their visit. Progress Notes:  5:04 PM  Likely eczema. Will Rx TCA cream, F/U PCP. Aubrey Ken MD    Procedures:   Procedures    Diagnostic Study Results   Labs -    No results found for this or any previous visit (from the past 12 hour(s)). Radiologic Studies -  The following have been ordered and reviewed:  No orders to display     CT Results  (Last 48 hours)    None        CXR Results  (Last 48 hours)    None            Vital Signs-Reviewed the patient's vital signs. Patient Vitals for the past 12 hrs:   Temp Pulse Resp BP SpO2   08/08/21 1551    (!) 181/74 97 %   08/08/21 1547 99.7 °F (37.6 °C) 88 18         Medications Given in the ED:  Medications - No data to display    Diagnosis:  Clinical Impression:   1. Other eczema         Plan:  1:   Follow-up Information     Follow up With Specialties Details Why Contact Info    Marcelina Johnson MD Family Medicine In 3 days  [address]  [phone]            2:   Current Discharge Medication List      START taking these medications    Details   triamcinolone acetonide (KENALOG) 0.1 % topical cream Apply  to affected area two (2) times a day. use thin layer  Qty: 15 g, Refills: 0  Start date: 8/8/2021           Return to ED if worse. Disposition:  Home  _______________________________   Attestations: This note was performed by Aubrey Ken MD in its entirety.   _______________________________

## 2021-10-05 NOTE — PROGRESS NOTES
Osvaldo Peterson is a 61 y.o. female who presents with the following:  Chief Complaint   Patient presents with    Cold Symptoms    Sinus Pain    Cough       Cold Symptoms   The history is provided by the patient (Patient with URI over 4 weeks ago which has left her with persisting wheezing and maxillary sinus pressure and pain with postnasal drip.). Associated symptoms include shortness of breath and wheezing. Pertinent negatives include no chest pain, no headaches and no myalgias. Sinus Pain    Associated symptoms include cough and shortness of breath. Pertinent negatives include no headaches and no chest pain. Cough   Associated symptoms include shortness of breath. Pertinent negatives include no chest pain, no abdominal pain and no headaches. No Known Allergies    Current Outpatient Prescriptions   Medication Sig    levoFLOXacin (LEVAQUIN) 500 mg tablet Take 1 Tab by mouth daily for 10 days.  predniSONE (DELTASONE) 20 mg tablet 5 po every day x 4 days then 4 on day 5, 3 on day 6, 2 on day 7, and 1 on day 8    metFORMIN (GLUCOPHAGE) 500 mg tablet TAKE ONE TABLET BY MOUTH TWICE DAILY WITH  MEALS    benzonatate (TESSALON) 200 mg capsule TAKE ONE CAPSULE BY MOUTH THREE TIMES DAILY AS NEEDED FOR  COUGH  FOR  UP  TO  7  DAYS    guaiFENesin (MUCINEX) 1,200 mg Ta12 ER tablet Take 1 Tab by mouth two (2) times a day.  dilTIAZem CD (CARDIZEM CD) 240 mg ER capsule Take 1 Cap by mouth daily.  hydroCHLOROthiazide (HYDRODIURIL) 50 mg tablet TAKE ONE TABLET BY MOUTH EVERY DAY    ipratropium (ATROVENT) 0.06 % nasal spray 1 Codorus by Both Nostrils route four (4) times daily. Indications: RHINORRHEA    montelukast (SINGULAIR) 10 mg tablet TAKE ONE TABLET BY MOUTH ONCE DAILY    quinapril (ACCUPRIL) 20 mg tablet Take 1 Tab by mouth nightly. Indications: hypertension    ipratropium (ATROVENT) 0.02 % nebulizer solution 2.5 mL by Nebulization route as needed for Wheezing.  Indications: MAINTENANCE THERAPY FOR ASTHMA    fluticasone-salmeterol (ADVAIR) 100-50 mcg/dose diskus inhaler Take 1 Puff by inhalation two (2) times a day. Indications: MAINTENANCE THERAPY FOR ASTHMA    fluticasone (FLONASE) 50 mcg/actuation nasal spray 2 sprays per nostril daily    metFORMIN (GLUCOPHAGE) 500 mg tablet Take 1 Tab by mouth two (2) times daily (with meals).  ferrous sulfate 325 mg (65 mg iron) tablet Take 1 Tab by mouth Daily (before breakfast). Indications: IRON DEFICIENCY ANEMIA    albuterol (PROVENTIL HFA, VENTOLIN HFA, PROAIR HFA) 90 mcg/actuation inhaler Take 2 Puffs by inhalation every four (4) hours as needed for Wheezing or Shortness of Breath. No current facility-administered medications for this visit. Past Medical History:   Diagnosis Date    Anemia     Diabetes mellitus type II     Hypertension        Past Surgical History:   Procedure Laterality Date    HX GI  2006     bowel surgery for infection s/p colostomy and then removal     HX PELVIC LAPAROSCOPY         Family History   Problem Relation Age of Onset    Cancer Mother      breast    Diabetes Mother     Hypertension Sister     Diabetes Sister        Social History     Social History    Marital status:      Spouse name: N/A    Number of children: N/A    Years of education: N/A     Social History Main Topics    Smoking status: Former Smoker    Smokeless tobacco: Never Used    Alcohol use No    Drug use: No    Sexual activity: Not Asked     Other Topics Concern    None     Social History Narrative       Review of Systems   Respiratory: Positive for cough, shortness of breath and wheezing. Cardiovascular: Negative for chest pain. Gastrointestinal: Negative for abdominal pain. Musculoskeletal: Negative for myalgias. Neurological: Negative for headaches.        Visit Vitals    /68 (BP 1 Location: Left arm, BP Patient Position: Sitting)    Pulse 80    Temp 97.7 °F (36.5 °C) (Oral)    Resp 18    Ht 5' 5\" (1.651 m)  Wt 306 lb (138.8 kg)    SpO2 98%    BMI 50.92 kg/m2     Physical Exam   Constitutional: She is oriented to person, place, and time and well-developed, well-nourished, and in no distress. Ill looking   HENT:   Head: Normocephalic and atraumatic. Right Ear: External ear normal.   Left Ear: External ear normal.   Mouth/Throat: Oropharynx is clear and moist.   MM's are red with pus about them-post nasal drip with pus down the throat   Eyes: Conjunctivae and EOM are normal. Pupils are equal, round, and reactive to light. Right eye exhibits no discharge. Left eye exhibits no discharge. Neck: Normal range of motion. Neck supple. No tracheal deviation present. No thyromegaly present. Cardiovascular: Normal rate, regular rhythm, normal heart sounds and intact distal pulses. Exam reveals no gallop and no friction rub. No murmur heard. Pulmonary/Chest: Effort normal. No respiratory distress. She has wheezes. She has no rales. She exhibits no tenderness. Abdominal: Soft. Bowel sounds are normal. She exhibits no distension and no mass. There is no tenderness. There is no rebound and no guarding. Musculoskeletal: Normal range of motion. She exhibits no edema or tenderness. Lymphadenopathy:     She has no cervical adenopathy. Neurological: She is alert and oriented to person, place, and time. She has normal reflexes. No cranial nerve deficit. She exhibits normal muscle tone. Gait normal. Coordination normal.   Skin: Skin is warm and dry. No rash noted. She is not diaphoretic. No erythema. No pallor. Psychiatric: Mood, memory, affect and judgment normal.         ICD-10-CM ICD-9-CM    1. 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  Acute non-recurrent maxillary sinusitis J01.00 461.0 levoFLOXacin (LEVAQUIN) 500 mg tablet      predniSONE (DELTASONE) 20 mg tablet       Orders Placed This Encounter    levoFLOXacin (LEVAQUIN) 500 mg tablet     Sig: Take 1 Tab by mouth daily for 10 days.      Dispense:  10 Tab     Refill:  0    predniSONE (DELTASONE) 20 mg tablet     Si po every day x 4 days then 4 on day 5, 3 on day 6, 2 on day 7, and 1 on day 8     Dispense:  30 Tab     Refill:  0   Recheck in 2 weeks if still wheezing-patient will continue her nebulizer at home with albuterol and Atrovent she currently has    Follow-up Disposition: Not on [facility] MD Handoff

## 2022-03-18 PROBLEM — Z98.890 S/P REPAIR OF VENTRAL HERNIA: Status: ACTIVE | Noted: 2018-03-26

## 2022-03-18 PROBLEM — Z87.19 S/P REPAIR OF VENTRAL HERNIA: Status: ACTIVE | Noted: 2018-03-26

## 2022-03-19 PROBLEM — K56.609 SMALL BOWEL OBSTRUCTION (HCC): Status: ACTIVE | Noted: 2017-10-27

## 2022-03-19 PROBLEM — E11.9 TYPE 2 DIABETES MELLITUS WITHOUT COMPLICATION, WITHOUT LONG-TERM CURRENT USE OF INSULIN (HCC): Status: ACTIVE | Noted: 2017-11-07

## 2022-03-19 PROBLEM — N19 RENAL FAILURE SYNDROME: Status: ACTIVE | Noted: 2018-03-24

## 2022-03-19 PROBLEM — J45.20 MILD INTERMITTENT ASTHMA WITHOUT COMPLICATION: Status: ACTIVE | Noted: 2017-03-03

## 2022-03-20 PROBLEM — N17.9 ACUTE RENAL FAILURE (HCC): Status: ACTIVE | Noted: 2018-03-24

## 2022-03-31 ENCOUNTER — HOSPITAL ENCOUNTER (OUTPATIENT)
Dept: MAMMOGRAPHY | Age: 62
Discharge: HOME | End: 2022-03-31
Attending: INTERNAL MEDICINE
Payer: COMMERCIAL

## 2022-03-31 VITALS — BODY MASS INDEX: 53.25 KG/M2 | WEIGHT: 293 LBS

## 2022-03-31 DIAGNOSIS — Z12.31 VISIT FOR SCREENING MAMMOGRAM: ICD-10-CM

## 2022-03-31 PROCEDURE — 77063 BREAST TOMOSYNTHESIS BI: CPT

## 2022-06-06 ENCOUNTER — TRANSCRIBE ORDER (OUTPATIENT)
Dept: SCHEDULING | Age: 62
End: 2022-06-06

## 2022-06-06 DIAGNOSIS — R06.09 DYSPNEA ON EXERTION: Primary | ICD-10-CM

## 2022-11-03 ENCOUNTER — HOSPITAL ENCOUNTER (EMERGENCY)
Age: 62
Discharge: ANOTHER HOSPITAL | End: 2022-11-04
Attending: EMERGENCY MEDICINE
Payer: COMMERCIAL

## 2022-11-03 ENCOUNTER — APPOINTMENT (OUTPATIENT)
Dept: CT IMAGING | Age: 62
End: 2022-11-03
Attending: EMERGENCY MEDICINE
Payer: COMMERCIAL

## 2022-11-03 VITALS
OXYGEN SATURATION: 99 % | WEIGHT: 293 LBS | TEMPERATURE: 98.4 F | HEIGHT: 65 IN | BODY MASS INDEX: 48.82 KG/M2 | SYSTOLIC BLOOD PRESSURE: 138 MMHG | DIASTOLIC BLOOD PRESSURE: 74 MMHG | RESPIRATION RATE: 17 BRPM | HEART RATE: 89 BPM

## 2022-11-03 DIAGNOSIS — K43.9 ABDOMINAL WALL HERNIA: ICD-10-CM

## 2022-11-03 DIAGNOSIS — E87.5 ACUTE HYPERKALEMIA: ICD-10-CM

## 2022-11-03 DIAGNOSIS — N17.9 AKI (ACUTE KIDNEY INJURY) (HCC): Primary | ICD-10-CM

## 2022-11-03 LAB
ALBUMIN SERPL-MCNC: 3.6 G/DL (ref 3.5–5)
ALBUMIN/GLOB SERPL: 0.9 {RATIO} (ref 1.1–2.2)
ALP SERPL-CCNC: 99 U/L (ref 45–117)
ALT SERPL-CCNC: 13 U/L (ref 12–78)
ANION GAP SERPL CALC-SCNC: 12 MMOL/L (ref 5–15)
ANION GAP SERPL CALC-SCNC: 15 MMOL/L (ref 5–15)
APPEARANCE UR: CLEAR
AST SERPL-CCNC: 6 U/L (ref 15–37)
ATRIAL RATE: 88 BPM
BASOPHILS # BLD: 0 K/UL (ref 0–0.1)
BASOPHILS NFR BLD: 0 % (ref 0–1)
BILIRUB SERPL-MCNC: 0.4 MG/DL (ref 0.2–1)
BILIRUB UR QL: NEGATIVE
BUN SERPL-MCNC: 42 MG/DL (ref 6–20)
BUN SERPL-MCNC: 43 MG/DL (ref 6–20)
BUN/CREAT SERPL: 27 (ref 12–20)
BUN/CREAT SERPL: 29 (ref 12–20)
CALCIUM SERPL-MCNC: 8.9 MG/DL (ref 8.5–10.1)
CALCIUM SERPL-MCNC: 9.5 MG/DL (ref 8.5–10.1)
CALCULATED R AXIS, ECG10: 15 DEGREES
CALCULATED T AXIS, ECG11: 27 DEGREES
CHLORIDE SERPL-SCNC: 103 MMOL/L (ref 97–108)
CHLORIDE SERPL-SCNC: 104 MMOL/L (ref 97–108)
CO2 SERPL-SCNC: 20 MMOL/L (ref 21–32)
CO2 SERPL-SCNC: 22 MMOL/L (ref 21–32)
COLOR UR: NORMAL
CREAT SERPL-MCNC: 1.44 MG/DL (ref 0.55–1.02)
CREAT SERPL-MCNC: 1.58 MG/DL (ref 0.55–1.02)
DIAGNOSIS, 93000: NORMAL
DIFFERENTIAL METHOD BLD: ABNORMAL
EOSINOPHIL # BLD: 0 K/UL (ref 0–0.4)
EOSINOPHIL NFR BLD: 0 % (ref 0–7)
ERYTHROCYTE [DISTWIDTH] IN BLOOD BY AUTOMATED COUNT: 16.8 % (ref 11.5–14.5)
GLOBULIN SER CALC-MCNC: 4 G/DL (ref 2–4)
GLUCOSE BLD STRIP.AUTO-MCNC: 118 MG/DL (ref 65–117)
GLUCOSE BLD STRIP.AUTO-MCNC: 125 MG/DL (ref 65–117)
GLUCOSE BLD STRIP.AUTO-MCNC: 156 MG/DL (ref 65–117)
GLUCOSE SERPL-MCNC: 104 MG/DL (ref 65–100)
GLUCOSE SERPL-MCNC: 126 MG/DL (ref 65–100)
GLUCOSE UR STRIP.AUTO-MCNC: NEGATIVE MG/DL
HCT VFR BLD AUTO: 33.7 % (ref 35–47)
HGB BLD-MCNC: 10.4 G/DL (ref 11.5–16)
HGB UR QL STRIP: NEGATIVE
IMM GRANULOCYTES # BLD AUTO: 0.1 K/UL (ref 0–0.04)
IMM GRANULOCYTES NFR BLD AUTO: 1 % (ref 0–0.5)
KETONES UR QL STRIP.AUTO: NEGATIVE MG/DL
LEUKOCYTE ESTERASE UR QL STRIP.AUTO: NEGATIVE
LIPASE SERPL-CCNC: 145 U/L (ref 73–393)
LYMPHOCYTES # BLD: 2.1 K/UL (ref 0.8–3.5)
LYMPHOCYTES NFR BLD: 20 % (ref 12–49)
MCH RBC QN AUTO: 26.5 PG (ref 26–34)
MCHC RBC AUTO-ENTMCNC: 30.9 G/DL (ref 30–36.5)
MCV RBC AUTO: 85.8 FL (ref 80–99)
MONOCYTES # BLD: 0.7 K/UL (ref 0–1)
MONOCYTES NFR BLD: 7 % (ref 5–13)
NEUTS SEG # BLD: 7.9 K/UL (ref 1.8–8)
NEUTS SEG NFR BLD: 73 % (ref 32–75)
NITRITE UR QL STRIP.AUTO: NEGATIVE
NRBC # BLD: 0 K/UL (ref 0–0.01)
NRBC BLD-RTO: 0 PER 100 WBC
PH UR STRIP: 6 [PH] (ref 5–8)
PLATELET # BLD AUTO: 561 K/UL (ref 150–400)
PMV BLD AUTO: 9.4 FL (ref 8.9–12.9)
POTASSIUM SERPL-SCNC: 6.3 MMOL/L (ref 3.5–5.1)
POTASSIUM SERPL-SCNC: 6.6 MMOL/L (ref 3.5–5.1)
POTASSIUM SERPL-SCNC: 6.6 MMOL/L (ref 3.5–5.1)
POTASSIUM SERPL-SCNC: 6.7 MMOL/L (ref 3.5–5.1)
POTASSIUM SERPL-SCNC: 7.6 MMOL/L (ref 3.5–5.1)
POTASSIUM SERPL-SCNC: 7.8 MMOL/L (ref 3.5–5.1)
PROT SERPL-MCNC: 7.6 G/DL (ref 6.4–8.2)
PROT UR STRIP-MCNC: NEGATIVE MG/DL
Q-T INTERVAL, ECG07: 416 MS
QRS DURATION, ECG06: 74 MS
QTC CALCULATION (BEZET), ECG08: 408 MS
RBC # BLD AUTO: 3.93 M/UL (ref 3.8–5.2)
SERVICE CMNT-IMP: ABNORMAL
SODIUM SERPL-SCNC: 138 MMOL/L (ref 136–145)
SODIUM SERPL-SCNC: 138 MMOL/L (ref 136–145)
SP GR UR REFRACTOMETRY: 1.02 (ref 1–1.03)
UROBILINOGEN UR QL STRIP.AUTO: 0.2 EU/DL (ref 0.2–1)
VENTRICULAR RATE, ECG03: 58 BPM
WBC # BLD AUTO: 10.9 K/UL (ref 3.6–11)

## 2022-11-03 PROCEDURE — 84132 ASSAY OF SERUM POTASSIUM: CPT

## 2022-11-03 PROCEDURE — 96368 THER/DIAG CONCURRENT INF: CPT

## 2022-11-03 PROCEDURE — 36415 COLL VENOUS BLD VENIPUNCTURE: CPT

## 2022-11-03 PROCEDURE — 74011250636 HC RX REV CODE- 250/636: Performed by: EMERGENCY MEDICINE

## 2022-11-03 PROCEDURE — 96361 HYDRATE IV INFUSION ADD-ON: CPT

## 2022-11-03 PROCEDURE — 74011250637 HC RX REV CODE- 250/637: Performed by: FAMILY MEDICINE

## 2022-11-03 PROCEDURE — 96375 TX/PRO/DX INJ NEW DRUG ADDON: CPT

## 2022-11-03 PROCEDURE — 74011636637 HC RX REV CODE- 636/637: Performed by: EMERGENCY MEDICINE

## 2022-11-03 PROCEDURE — 83690 ASSAY OF LIPASE: CPT

## 2022-11-03 PROCEDURE — 96376 TX/PRO/DX INJ SAME DRUG ADON: CPT

## 2022-11-03 PROCEDURE — 85025 COMPLETE CBC W/AUTO DIFF WBC: CPT

## 2022-11-03 PROCEDURE — 74011000250 HC RX REV CODE- 250: Performed by: EMERGENCY MEDICINE

## 2022-11-03 PROCEDURE — 74176 CT ABD & PELVIS W/O CONTRAST: CPT

## 2022-11-03 PROCEDURE — 93005 ELECTROCARDIOGRAM TRACING: CPT

## 2022-11-03 PROCEDURE — 74011250637 HC RX REV CODE- 250/637: Performed by: EMERGENCY MEDICINE

## 2022-11-03 PROCEDURE — 82962 GLUCOSE BLOOD TEST: CPT

## 2022-11-03 PROCEDURE — 99285 EMERGENCY DEPT VISIT HI MDM: CPT

## 2022-11-03 PROCEDURE — 81003 URINALYSIS AUTO W/O SCOPE: CPT

## 2022-11-03 PROCEDURE — 96365 THER/PROPH/DIAG IV INF INIT: CPT

## 2022-11-03 RX ORDER — ONDANSETRON 2 MG/ML
8 INJECTION INTRAMUSCULAR; INTRAVENOUS ONCE
Status: COMPLETED | OUTPATIENT
Start: 2022-11-03 | End: 2022-11-03

## 2022-11-03 RX ORDER — CALCIUM GLUCONATE 94 MG/ML
1 INJECTION, SOLUTION INTRAVENOUS
Status: DISCONTINUED | OUTPATIENT
Start: 2022-11-03 | End: 2022-11-03

## 2022-11-03 RX ORDER — CALCIUM GLUCONATE 20 MG/ML
1 INJECTION, SOLUTION INTRAVENOUS ONCE
Status: COMPLETED | OUTPATIENT
Start: 2022-11-03 | End: 2022-11-03

## 2022-11-03 RX ORDER — CALCIUM GLUCONATE 94 MG/ML
1 INJECTION, SOLUTION INTRAVENOUS
Status: DISCONTINUED | OUTPATIENT
Start: 2022-11-03 | End: 2022-11-04

## 2022-11-03 RX ORDER — SODIUM BICARBONATE 84 MG/ML
50 INJECTION, SOLUTION INTRAVENOUS
Status: COMPLETED | OUTPATIENT
Start: 2022-11-03 | End: 2022-11-03

## 2022-11-03 RX ORDER — DEXTROSE MONOHYDRATE 100 MG/ML
250 INJECTION, SOLUTION INTRAVENOUS ONCE
Status: COMPLETED | OUTPATIENT
Start: 2022-11-03 | End: 2022-11-04

## 2022-11-03 RX ORDER — DEXTROSE MONOHYDRATE 100 MG/ML
250 INJECTION, SOLUTION INTRAVENOUS ONCE
Status: COMPLETED | OUTPATIENT
Start: 2022-11-03 | End: 2022-11-03

## 2022-11-03 RX ORDER — CALCIUM GLUCONATE 94 MG/ML
1 INJECTION, SOLUTION INTRAVENOUS
Status: DISCONTINUED | OUTPATIENT
Start: 2022-11-03 | End: 2022-11-03 | Stop reason: SDUPTHER

## 2022-11-03 RX ORDER — MORPHINE SULFATE 4 MG/ML
4 INJECTION INTRAVENOUS ONCE
Status: COMPLETED | OUTPATIENT
Start: 2022-11-03 | End: 2022-11-03

## 2022-11-03 RX ORDER — SODIUM BICARBONATE 84 MG/ML
50 INJECTION, SOLUTION INTRAVENOUS
Status: COMPLETED | OUTPATIENT
Start: 2022-11-03 | End: 2022-11-04

## 2022-11-03 RX ORDER — SODIUM CHLORIDE 9 MG/ML
150 INJECTION, SOLUTION INTRAVENOUS CONTINUOUS
Status: DISCONTINUED | OUTPATIENT
Start: 2022-11-03 | End: 2022-11-04 | Stop reason: HOSPADM

## 2022-11-03 RX ORDER — SODIUM CHLORIDE 0.9 % (FLUSH) 0.9 %
5-10 SYRINGE (ML) INJECTION ONCE
Status: DISPENSED | OUTPATIENT
Start: 2022-11-03 | End: 2022-11-03

## 2022-11-03 RX ADMIN — SODIUM ZIRCONIUM CYCLOSILICATE 10 G: 10 POWDER, FOR SUSPENSION ORAL at 16:46

## 2022-11-03 RX ADMIN — SODIUM ZIRCONIUM CYCLOSILICATE 10 G: 10 POWDER, FOR SUSPENSION ORAL at 23:21

## 2022-11-03 RX ADMIN — SODIUM ZIRCONIUM CYCLOSILICATE 10 G: 10 POWDER, FOR SUSPENSION ORAL at 19:25

## 2022-11-03 RX ADMIN — SODIUM CHLORIDE 150 ML/HR: 9 INJECTION, SOLUTION INTRAVENOUS at 15:00

## 2022-11-03 RX ADMIN — Medication 5 UNITS: at 12:36

## 2022-11-03 RX ADMIN — CALCIUM GLUCONATE 1000 MG: 20 INJECTION, SOLUTION INTRAVENOUS at 12:35

## 2022-11-03 RX ADMIN — DEXTROSE MONOHYDRATE 250 ML: 100 INJECTION, SOLUTION INTRAVENOUS at 12:35

## 2022-11-03 RX ADMIN — DEXTROSE MONOHYDRATE 250 ML: 100 INJECTION, SOLUTION INTRAVENOUS at 16:46

## 2022-11-03 RX ADMIN — MORPHINE SULFATE 4 MG: 4 INJECTION INTRAVENOUS at 12:05

## 2022-11-03 RX ADMIN — SODIUM BICARBONATE 50 MEQ: 84 INJECTION, SOLUTION INTRAVENOUS at 12:35

## 2022-11-03 RX ADMIN — ONDANSETRON 8 MG: 2 INJECTION INTRAMUSCULAR; INTRAVENOUS at 12:02

## 2022-11-03 RX ADMIN — SODIUM BICARBONATE 50 MEQ: 84 INJECTION, SOLUTION INTRAVENOUS at 16:45

## 2022-11-03 RX ADMIN — CALCIUM GLUCONATE 1000 MG: 20 INJECTION, SOLUTION INTRAVENOUS at 16:45

## 2022-11-03 RX ADMIN — SODIUM CHLORIDE 1000 ML: 9 INJECTION, SOLUTION INTRAVENOUS at 12:02

## 2022-11-03 RX ADMIN — Medication 5 UNITS: at 16:46

## 2022-11-03 NOTE — PROGRESS NOTES
TRANSPORTATION AUTHORIZATION:  [facility]AR[facility]:  TRIP ID NUMBER: [address]: [phone]

## 2022-11-03 NOTE — PROGRESS NOTES
Writer contacted [facility] and spoke with Darren Hopper to arrange ALS transport for this patient to [address] [place] [address]. Requested information provided (Dx, wt, ht, CM, room air, Normal Saline at 150 ml/hr, no isolation precautions and insurance information provided) ETA 0130 on 11/4-- AGUEDA Moreau made aware. 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

## 2022-11-03 NOTE — ED NOTES
Nurse report was received from [address] Patient is awaiting transfer. Will continue to monitor.

## 2022-11-03 NOTE — ED PROVIDER NOTES
EMERGENCY DEPARTMENT HISTORY AND PHYSICAL EXAM          Date: 11/3/2022  Patient Name: Idris Bird    History of Presenting Illness     Chief Complaint   Patient presents with    Abdominal Pain       History Provided By: Patient    HPI: Idris Bird is a 57 y.o. female, pmhx hypertension and diabetes, who presents ambulatory to the ED c/o abdominal pain    Explains has been having abdominal pain has been constant for the past week. She stated progressively worsened last night and she was unable to sleep. She has been taking Tylenol and Motrin intermittently with the last dose this morning. She notes the medications are not helping her symptoms. She denies any associated fevers, chills, nausea, vomiting, diarrhea, hematuria and pain with urination. She states has been checking her sugars at home and they have been good. PCP: Rick Lorenzo DO  Nephro: BLAISE Walters    Allergies: nkda    There are no other complaints, changes, or physical findings at this time. Current Facility-Administered Medications   Medication Dose Route Frequency Provider Last Rate Last Admin    calcium gluconate injection 1 g  1 g IntraVENous NOW Ameena Madrigal MD        calcium gluconate 1 gram/100 mL sodium chloride (ISO-OSM) IVPB  1 g IntraVENous ONCE Gordon Sanchez MD        0.9% sodium chloride infusion  150 mL/hr IntraVENous CONTINUOUS Gordon Sanchez  mL/hr at 11/03/22 1500 150 mL/hr at 11/03/22 1500    insulin regular (NOVOLIN R, HUMULIN R) injection   IntraVENous NOW Maximiliano Tubbs MD        dextrose 10% infusion 250 mL  250 mL IntraVENous Slim Aviles MD        sodium bicarbonate (8.4%) injection 50 mEq  50 mEq IntraVENous NOW Maximiliano Tubbs MD         Current Outpatient Medications   Medication Sig Dispense Refill    triamcinolone acetonide (KENALOG) 0.1 % topical cream Apply  to affected area two (2) times a day.  use thin layer 15 g 0    dilTIAZem (TIAZAC) 360 mg ER capsule TAKE 1 CAPSULE BY MOUTH ONCE DAILY (DO NOT OPEN CRUSH OR CHEW SWALLOW WHOLE)      hydroCHLOROthiazide (HYDRODIURIL) 50 mg tablet TAKE 1 TABLET BY MOUTH ONCE DAILY      quinapril (ACCUPRIL) 40 mg tablet TAKE 1 TABLET BY MOUTH AT BEDTIME      metFORMIN (GLUCOPHAGE) 1,000 mg tablet TAKE 1 TABLET BY MOUTH TWICE DAILY WITH MEALS      guaiFENesin (MUCINEX) 1,200 mg Ta12 ER tablet Take 1 Tab by mouth two (2) times a day. 20 Tab 0    CHOLECALCIFEROL, VITAMIN D3, (VITAMIN D3 PO) Take 1 Tab by mouth daily. acetaminophen (TYLENOL EXTRA STRENGTH) 500 mg tablet Take 1,000 mg by mouth every six (6) hours as needed for Pain. atorvastatin (LIPITOR) 20 mg tablet Take 20 mg by mouth nightly. ascorbic acid, vitamin C, (VITAMIN C) 500 mg tablet Take 500 mg by mouth daily. montelukast (SINGULAIR) 10 mg tablet TAKE ONE TABLET BY MOUTH ONCE DAILY 30 Tab 5    ferrous sulfate 325 mg (65 mg iron) tablet Take 1 Tab by mouth Daily (before breakfast). Indications: IRON DEFICIENCY ANEMIA 90 Tab 1    albuterol (PROVENTIL HFA, VENTOLIN HFA, PROAIR HFA) 90 mcg/actuation inhaler Take 2 Puffs by inhalation every four (4) hours as needed for Wheezing or Shortness of Breath.  1 Inhaler 5       Past History     Past Medical History:  Past Medical History:   Diagnosis Date    Anemia     Diabetes mellitus type II     Hypertension     Menopause        Past Surgical History:  Past Surgical History:   Procedure Laterality Date    HX GI  2006     bowel surgery for infection s/p colostomy and then removal     HX PELVIC LAPAROSCOPY         Family History:  Family History   Problem Relation Age of Onset    Diabetes Mother     Breast Cancer Mother         age dx 63's    Hypertension Mother     Hypertension Sister     Breast Cancer Sister         age dx 52's    Diabetes Sister     Heart Disease Father     Hypertension Father     Stroke Maternal Grandfather     Stroke Paternal Grandfather     Breast Cancer Paternal Aunt     Breast Cancer Paternal Aunt        Social History:  Social History     Tobacco Use    Smoking status: Former    Smokeless tobacco: Never   Substance Use Topics    Alcohol use: No    Drug use: No       Allergies:  No Known Allergies      Review of Systems   Review of Systems   Constitutional:  Negative for activity change, appetite change, chills, fever and unexpected weight change. HENT:  Negative for congestion. Eyes:  Negative for pain and visual disturbance. Respiratory:  Negative for cough and shortness of breath. Cardiovascular:  Negative for chest pain. Gastrointestinal:  Positive for abdominal pain. Negative for diarrhea, nausea and vomiting. Genitourinary:  Negative for dysuria. Musculoskeletal:  Negative for back pain. Skin:  Negative for rash. Neurological:  Negative for headaches. Physical Exam   Physical Exam  Vitals and nursing note reviewed. Constitutional:       Appearance: She is well-developed. She is not diaphoretic. Comments: Morbidly obese middle-aged female, with elevated blood pressure appearing uncomfortable in mild acute distress   HENT:      Head: Normocephalic and atraumatic. Eyes:      General:         Right eye: No discharge. Left eye: No discharge. Conjunctiva/sclera: Conjunctivae normal.      Pupils: Pupils are equal, round, and reactive to light. Cardiovascular:      Rate and Rhythm: Normal rate and regular rhythm. Heart sounds: Normal heart sounds. No murmur heard. Pulmonary:      Effort: Pulmonary effort is normal. No respiratory distress. Breath sounds: Normal breath sounds. No wheezing or rales. Abdominal:      General: Bowel sounds are normal. There is no distension. Palpations: Abdomen is soft. Tenderness: There is no abdominal tenderness. There is no guarding or rebound. Negative signs include Lopez's sign.       Comments: Patient points to the left mid quadrant as the site of pain but there is no focal site of tenderness Musculoskeletal:         General: Normal range of motion. Cervical back: Normal range of motion and neck supple. Skin:     General: Skin is warm and dry. Findings: No rash. Neurological:      Mental Status: She is alert and oriented to person, place, and time. Cranial Nerves: No cranial nerve deficit. Motor: No abnormal muscle tone. Diagnostic Study Results     Labs -     Recent Results (from the past 24 hour(s))   CBC WITH AUTOMATED DIFF    Collection Time: 11/03/22 11:33 AM   Result Value Ref Range    WBC 10.9 3.6 - 11.0 K/uL    RBC 3.93 3.80 - 5.20 M/uL    HGB 10.4 (L) 11.5 - 16.0 g/dL    HCT 33.7 (L) 35.0 - 47.0 %    MCV 85.8 80.0 - 99.0 FL    MCH 26.5 26.0 - 34.0 PG    MCHC 30.9 30.0 - 36.5 g/dL    RDW 16.8 (H) 11.5 - 14.5 %    PLATELET 486 (H) 073 - 400 K/uL    MPV 9.4 8.9 - 12.9 FL    NRBC 0.0 0.0  WBC    ABSOLUTE NRBC 0.00 0.00 - 0.01 K/uL    NEUTROPHILS 73 32 - 75 %    LYMPHOCYTES 20 12 - 49 %    MONOCYTES 7 5 - 13 %    EOSINOPHILS 0 0 - 7 %    BASOPHILS 0 0 - 1 %    IMMATURE GRANULOCYTES 1 (H) 0 - 0.5 %    ABS. NEUTROPHILS 7.9 1.8 - 8.0 K/UL    ABS. LYMPHOCYTES 2.1 0.8 - 3.5 K/UL    ABS. MONOCYTES 0.7 0.0 - 1.0 K/UL    ABS. EOSINOPHILS 0.0 0.0 - 0.4 K/UL    ABS. BASOPHILS 0.0 0.0 - 0.1 K/UL    ABS. IMM. GRANS. 0.1 (H) 0.00 - 0.04 K/UL    DF AUTOMATED     METABOLIC PANEL, COMPREHENSIVE    Collection Time: 11/03/22 11:33 AM   Result Value Ref Range    Sodium 138 136 - 145 mmol/L    Potassium 6.6 (HH) 3.5 - 5.1 mmol/L    Chloride 103 97 - 108 mmol/L    CO2 20 (L) 21 - 32 mmol/L    Anion gap 15 5 - 15 mmol/L    Glucose 126 (H) 65 - 100 mg/dL    BUN 43 (H) 6 - 20 MG/DL    Creatinine 1.58 (H) 0.55 - 1.02 MG/DL    BUN/Creatinine ratio 27 (H) 12 - 20      eGFR 37 (L) >60 ml/min/1.73m2    Calcium 9.5 8.5 - 10.1 MG/DL    Bilirubin, total 0.4 0.2 - 1.0 MG/DL    ALT (SGPT) 13 12 - 78 U/L    AST (SGOT) 6 (L) 15 - 37 U/L    Alk.  phosphatase 99 45 - 117 U/L    Protein, total 7.6 6.4 - 8.2 g/dL    Albumin 3.6 3.5 - 5.0 g/dL    Globulin 4.0 2.0 - 4.0 g/dL    A-G Ratio 0.9 (L) 1.1 - 2.2     LIPASE    Collection Time: 11/03/22 11:33 AM   Result Value Ref Range    Lipase 145 73 - 393 U/L   EKG, 12 LEAD, INITIAL    Collection Time: 11/03/22 12:17 PM   Result Value Ref Range    Ventricular Rate 58 BPM    Atrial Rate 88 BPM    QRS Duration 74 ms    Q-T Interval 416 ms    QTC Calculation (Bezet) 408 ms    Calculated R Axis 15 degrees    Calculated T Axis 27 degrees    Diagnosis       Atrial fibrillation with slow ventricular response  RRWP  Cannot rule out Anterior infarct , age undetermined  Abnormal ECG  No previous ECGs available  Confirmed by Parrish Levy MD, Naomi Beard ([id number]) on 11/3/2022 6:51:19 PM     GLUCOSE, POC    Collection Time: 11/03/22  1:19 PM   Result Value Ref Range    Glucose (POC) 156 (H) 65 - 117 mg/dL    Performed by Crow LIMA (PCT)    METABOLIC PANEL, BASIC    Collection Time: 11/03/22  2:24 PM   Result Value Ref Range    Sodium 138 136 - 145 mmol/L    Potassium 7.8 (HH) 3.5 - 5.1 mmol/L    Chloride 104 97 - 108 mmol/L    CO2 22 21 - 32 mmol/L    Anion gap 12 5 - 15 mmol/L    Glucose 104 (H) 65 - 100 mg/dL    BUN 42 (H) 6 - 20 MG/DL    Creatinine 1.44 (H) 0.55 - 1.02 MG/DL    BUN/Creatinine ratio 29 (H) 12 - 20      eGFR 41 (L) >60 ml/min/1.73m2    Calcium 8.9 8.5 - 10.1 MG/DL   POTASSIUM    Collection Time: 11/03/22  4:10 PM   Result Value Ref Range    Potassium 7.6 (HH) 3.5 - 5.1 mmol/L   GLUCOSE, POC    Collection Time: 11/03/22  4:46 PM   Result Value Ref Range    Glucose (POC) 118 (H) 65 - 117 mg/dL    Performed by Crow LIMA (PCT)    GLUCOSE, POC    Collection Time: 11/03/22  6:35 PM   Result Value Ref Range    Glucose (POC) 125 (H) 65 - 117 mg/dL    Performed by Murtis Dung A (PCT)    POTASSIUM    Collection Time: 11/03/22  6:45 PM   Result Value Ref Range    Potassium 6.6 (HH) 3.5 - 5.1 mmol/L   URINALYSIS W/ RFLX MICROSCOPIC    Collection Time: 11/03/22  6:52 PM   Result Value Ref Range    Color YELLOW/STRAW      Appearance CLEAR CLEAR      Specific gravity 1.020 1.003 - 1.030      pH (UA) 6.0 5.0 - 8.0      Protein Negative NEG mg/dL    Glucose Negative NEG mg/dL    Ketone Negative NEG mg/dL    Bilirubin Negative NEG      Blood Negative NEG      Urobilinogen 0.2 0.2 - 1.0 EU/dL    Nitrites Negative NEG      Leukocyte Esterase Negative NEG     POTASSIUM    Collection Time: 11/03/22  9:00 PM   Result Value Ref Range    Potassium 6.3 (HH) 3.5 - 5.1 mmol/L   POTASSIUM    Collection Time: 11/03/22 11:33 PM   Result Value Ref Range    Potassium 6.7 (HH) 3.5 - 5.1 mmol/L       Radiologic Studies -   CT ABD PELV WO CONT   Final Result   1. No acute abnormality is confirmed. Diverticulosis without diverticulitis. Stable postoperative change in the sigmoid colon. 2. Small shallow left anterior abdominal wall hernia at a probable previous   ostomy site, with no incarcerated bowel or abscess. 3. 2.4 x 1.7 cm hypodensity in the caudate lobe of the liver, not seen on prior   studies. While it measures water density and may represent a cyst, follow-up may   be helpful as it is new, also considering risk status. .   4. Lobular left upper renal pole cortex width CT scanner artifact, significance   uncertain. This may correspond with previously demonstrated hypodense masses. 5. Slight increase in what appears to be a left ovarian cyst.   6. 4 mm nodule in the left lung base possibly stable allowing for differences in   technique. Follow-up according to risk status. 7. Moderate pericardial effusion stable to slightly increased. CT Results  (Last 48 hours)                 11/03/22 1147  CT ABD PELV WO CONT Final result    Impression:  1. No acute abnormality is confirmed. Diverticulosis without diverticulitis. Stable postoperative change in the sigmoid colon.    2. Small shallow left anterior abdominal wall hernia at a probable previous   ostomy site, with no incarcerated bowel or abscess. 3. 2.4 x 1.7 cm hypodensity in the caudate lobe of the liver, not seen on prior   studies. While it measures water density and may represent a cyst, follow-up may   be helpful as it is new, also considering risk status. .   4. Lobular left upper renal pole cortex width CT scanner artifact, significance   uncertain. This may correspond with previously demonstrated hypodense masses. 5. Slight increase in what appears to be a left ovarian cyst.   6. 4 mm nodule in the left lung base possibly stable allowing for differences in   technique. Follow-up according to risk status. 7. Moderate pericardial effusion stable to slightly increased. Narrative:  EXAM: CT ABD PELV WO CONT       INDICATION: LLQ pain       COMPARISON: 2018, 2017. IV CONTRAST: None. ORAL CONTRAST: None       TECHNIQUE:    Thin axial images were obtained through the abdomen and pelvis. Coronal and   sagittal reformats were generated. CT dose reduction was achieved through use of   a standardized protocol tailored for this examination and automatic exposure   control for dose modulation. The absence of intravenous contrast material reduces the sensitivity for   evaluation of the vasculature and solid organs. FINDINGS:    LOWER THORAX: 4 mm nodule left lung base in the axillary line, slightly more   prominent than on the 2017 study, which may be related to volume averaging and   slice thickness. Moderate pericardial effusion is stable to slightly increased   since 2018. LIVER: A hypodense mass in the caudate lobe measures 2.4 x 1.7 cm and is new   since 2018. BILIARY TREE: Gallbladder is within normal limits. CBD is not dilated. SPLEEN: within normal limits. PANCREAS: No focal abnormality. ADRENALS: Unremarkable. KIDNEYS/URETERS: The left upper renal pole is lobular and may correspond to a   hypodense poorly enhancing mass in the left upper pole on the prior   contrast-enhanced study.  Image quality through this level is degraded by what   appears to be scanner artifact. STOMACH: Unremarkable. SMALL BOWEL: No dilatation or wall thickening. COLON: No dilatation or wall thickening. Anastomotic staples in the sigmoid   colon are stable. Diverticula without associated acute inflammatory change. APPENDIX: Not seen. Justice Amend PERITONEUM: No ascites or pneumoperitoneum. RETROPERITONEUM: No lymphadenopathy or aortic aneurysm. REPRODUCTIVE ORGANS: Uterus unremarkable and stable. Left ovary with a hypodense   mass 2.3 x 2.4 cm, slightly increased since the prior study. It is water density   and suggests a cyst.   URINARY BLADDER: No mass or calculus. BONES: No destructive bone lesion. ABDOMINAL WALL: Left anterior abdominal wall small broad mouth hernia containing   one nonobstructed bowel loop. Left anterior abdominal wall incision is otherwise   unremarkable. ADDITIONAL COMMENTS: N/A                 CXR Results  (Last 48 hours)      None              Medical Decision Making   I am the first provider for this patient. I reviewed the vital signs, available nursing notes, past medical history, past surgical history, family history and social history. Vital Signs-Reviewed the patient's vital signs.   Patient Vitals for the past 12 hrs:   Temp Pulse Resp BP SpO2   11/03/22 2352 98.4 °F (36.9 °C) 89 17 138/74 99 %   11/03/22 2345 -- 89 16 -- 96 %   11/03/22 2245 -- 93 15 -- 97 %   11/03/22 2145 -- 97 15 -- 96 %   11/03/22 2100 -- 95 14 130/73 96 %   11/03/22 2045 -- (!) 108 27 -- 100 %   11/03/22 1945 -- 93 15 (!) 153/67 95 %   11/03/22 1930 -- 94 16 137/67 95 %   11/03/22 1845 -- 94 14 (!) 140/49 97 %   11/03/22 1837 -- 91 16 -- --   11/03/22 1822 -- 93 21 (!) 155/80 98 %   11/03/22 1807 -- -- 17 -- --   11/03/22 1752 -- 72 16 (!) 160/40 99 %   11/03/22 1749 -- 78 18 (!) 168/108 95 %   11/03/22 1737 -- 71 19 -- 96 %   11/03/22 1723 -- 78 18 (!) 141/46 --   11/03/22 1722 -- 69 19 -- --   11/03/22 1713 -- 77 21 (!) 119/54 97 %   11/03/22 1708 -- 73 20 (!) 119/54 --   11/03/22 1707 -- 76 19 -- --   11/03/22 1656 -- 66 18 -- --   11/03/22 1652 -- 69 18 121/66 --   11/03/22 1641 -- 69 18 (!) 113/38 93 %   11/03/22 1637 -- 74 18 -- 95 %   11/03/22 1626 -- 67 18 (!) 132/46 100 %   11/03/22 1622 -- 65 15 (!) 112/48 90 %   11/03/22 1615 -- 66 18 (!) 112/48 99 %   11/03/22 1611 -- 71 17 96/70 96 %   11/03/22 1607 -- 68 18 -- --   11/03/22 1556 -- 66 20 -- --   11/03/22 1552 -- 68 17 -- --   11/03/22 1545 -- 70 15 (!) 131/46 --   11/03/22 1541 -- 74 15 -- --   11/03/22 1537 -- 62 17 -- 96 %   11/03/22 1526 -- 75 18 -- 99 %   11/03/22 1522 -- 66 17 -- 96 %   11/03/22 1515 -- 76 18 (!) 169/70 --   11/03/22 1511 -- 76 17 (!) 169/70 98 %   11/03/22 1507 -- 63 16 -- 96 %   11/03/22 1500 -- 71 14 (!) 152/51 97 %   11/03/22 1456 -- 65 18 (!) 152/51 97 %   11/03/22 1452 -- 68 19 -- 98 %   11/03/22 1441 -- 72 22 (!) 189/67 90 %   11/03/22 1437 -- -- -- (!) 229/74 --   11/03/22 1426 -- (!) 55 19 -- --   11/03/22 1416 -- (!) 55 20 (!) 144/59 99 %   11/03/22 1411 -- (!) 56 19 -- 98 %   11/03/22 1404 -- (!) 57 20 (!) 131/53 97 %   11/03/22 1400 -- (!) 57 -- (!) 131/53 98 %   11/03/22 1356 -- (!) 54 22 -- 100 %   11/03/22 1349 -- (!) 53 19 (!) 137/46 99 %   11/03/22 1345 -- (!) 55 -- (!) 137/46 --   11/03/22 1341 -- (!) 54 20 -- 100 %   11/03/22 1334 -- (!) 57 19 (!) 140/42 98 %   11/03/22 1326 -- 60 23 -- 100 %   11/03/22 1319 -- (!) 59 19 (!) 136/36 99 %   11/03/22 1311 -- (!) 59 21 -- 97 %   11/03/22 1304 -- 66 20 (!) 118/48 97 %   11/03/22 1256 -- 67 20 -- 97 %   11/03/22 1249 -- 70 20 (!) 134/46 97 %   11/03/22 1243 -- 60 20 (!) 156/35 99 %       Pulse Oximetry Analysis - 99% on RA    Records Reviewed: Nursing Notes, old medical records, labs and imaging    Provider Notes (Medical Decision Making):   MDM: Middle-aged female diabetic presenting afebrile with left lower quadrant pain without tenderness.   Notably hypertensive but without concerning signs of sepsis or bacteremia    EKG: Atrial fibrillation at a rate of 58 bpm; normal QRS; normal QTC and normal axis. There is no evidence of peaked T waves. This EKG was interpreted by Curry Cazares MD     ED Course:   Initial assessment performed. The patients presenting problems have been discussed, and they are in agreement with the care plan formulated and outlined with them. I have encouraged them to ask questions as they arise throughout their visit. PROGRESS NOTE:    ED Course as of 11/04/22 0008   u Nov 03, 2022   1205 Notified of lab of elevated potassium. There was no hemolysis. Medications to be provided. Patient will need a bed for cardiac monitoring and EKG. Patient needs bed for cardiac monitoring EKG and treatment [JT]   1450 Received call from lab regarding repeat potassium. Will request a redraw as no hemolysis. Call placed to the transfer center [JT]   [phone] Discussed case with Dr Sheree Lyon, Hospitalist [facility]. She recommends Lokelema or [facility] if we don't have the first. Recommends repeat treatment every couple hours. [JT]   1629 The potassium remains elevated. She did not get the oral lokalemia as we are waiting for redraw and it was held by nursing. Request all meds be provided [JT]   2120 K 6.3. Will give third dose of Lokelma. [VG]   2358 K 6.7. Have given max dose of Lokelma. Will give glucose, insulin, calcium, and bicarb. [VG]   Fri Nov 04, 2022   0007 [facility] here. Pt up to the bathroom, walked back to room steadily.   [VG]      ED Course User Index  [JT] Roxann Sanchez MD  [VG] Kassy Hunter MD       Critical Care Time:  CRITICAL CARE NOTE :    IMPENDING DETERIORATION -Airway, Respiratory, Cardiovascular, CNS, Metabolic, Renal, and Hepatic  ASSOCIATED RISK FACTORS - Hypotension, Dysrhythmia, and Metabolic changes  MANAGEMENT- Bedside Assessment, Supervision of Care, and Transfer  INTERPRETATION -  Xrays, ECG, and Blood Pressure  INTERVENTIONS - hemodynamic mngmt and Metobolic interventions  CASE REVIEW - Hospitalist/Intensivist, Nursing, and Family  TREATMENT RESPONSE -Stable  PERFORMED BY - Self    NOTES   :  I have spent 45 minutes of critical care time involved in lab review, consultations with specialist, family decision- making, bedside attention and documentation; time exculsive of EKG review/interpretation. During this entire length of time I was immediately available to the patient. Kate Negrete. MD Laura     Diagnosis     Clinical Impression:   1. JESSE (acute kidney injury) ([facility] [address]    2. Acute hyperkalemia    3. Abdominal wall hernia        PLAN:  transfer for higher level of care      Please note, this dictation was completed with [employer], the computer voice recognition software. Quite often unanticipated grammatical, syntax, homophones, and other interpretive errors are inadvertently transcribed by the computer software. Please disregard these errors. Please excuse any errors that have escaped final proof reading.

## 2022-11-03 NOTE — ED NOTES
Stretcher placed back in lowest position and side rails up, pt educated on how to use the controls on the stretcher.

## 2022-11-04 ENCOUNTER — APPOINTMENT (OUTPATIENT)
Dept: ULTRASOUND IMAGING | Age: 62
DRG: 425 | End: 2022-11-04
Attending: STUDENT IN AN ORGANIZED HEALTH CARE EDUCATION/TRAINING PROGRAM
Payer: COMMERCIAL

## 2022-11-04 ENCOUNTER — HOSPITAL ENCOUNTER (INPATIENT)
Age: 62
LOS: 2 days | Discharge: HOME | DRG: 425 | End: 2022-11-06
Attending: HOSPITALIST | Admitting: STUDENT IN AN ORGANIZED HEALTH CARE EDUCATION/TRAINING PROGRAM
Payer: COMMERCIAL

## 2022-11-04 PROBLEM — E87.5 HYPERKALEMIA: Status: ACTIVE | Noted: 2022-11-04

## 2022-11-04 PROBLEM — N17.9 AKI (ACUTE KIDNEY INJURY) (HCC): Status: ACTIVE | Noted: 2022-11-04

## 2022-11-04 LAB
ALBUMIN SERPL-MCNC: 3.9 G/DL (ref 3.5–5)
ALBUMIN/GLOB SERPL: 1.1 {RATIO} (ref 1.1–2.2)
ALP SERPL-CCNC: 102 U/L (ref 45–117)
ALT SERPL-CCNC: 14 U/L (ref 12–78)
ANION GAP SERPL CALC-SCNC: 13 MMOL/L (ref 5–15)
ANION GAP SERPL CALC-SCNC: 8 MMOL/L (ref 5–15)
AST SERPL-CCNC: 10 U/L (ref 15–37)
ATRIAL RATE: 88 BPM
BILIRUB SERPL-MCNC: 0.8 MG/DL (ref 0.2–1)
BUN SERPL-MCNC: 26 MG/DL (ref 6–20)
BUN SERPL-MCNC: 33 MG/DL (ref 6–20)
BUN/CREAT SERPL: 23 (ref 12–20)
BUN/CREAT SERPL: 28 (ref 12–20)
CALCIUM SERPL-MCNC: 9.2 MG/DL (ref 8.5–10.1)
CALCIUM SERPL-MCNC: 9.6 MG/DL (ref 8.5–10.1)
CALCULATED P AXIS, ECG09: 56 DEGREES
CALCULATED R AXIS, ECG10: 15 DEGREES
CALCULATED T AXIS, ECG11: 17 DEGREES
CHLORIDE SERPL-SCNC: 104 MMOL/L (ref 97–108)
CHLORIDE SERPL-SCNC: 106 MMOL/L (ref 97–108)
CO2 SERPL-SCNC: 17 MMOL/L (ref 21–32)
CO2 SERPL-SCNC: 21 MMOL/L (ref 21–32)
CREAT SERPL-MCNC: 1.15 MG/DL (ref 0.55–1.02)
CREAT SERPL-MCNC: 1.2 MG/DL (ref 0.55–1.02)
CREAT UR-MCNC: 79.9 MG/DL
DIAGNOSIS, 93000: NORMAL
GLOBULIN SER CALC-MCNC: 3.6 G/DL (ref 2–4)
GLUCOSE BLD STRIP.AUTO-MCNC: 101 MG/DL (ref 65–117)
GLUCOSE BLD STRIP.AUTO-MCNC: 104 MG/DL (ref 65–117)
GLUCOSE BLD STRIP.AUTO-MCNC: 104 MG/DL (ref 65–117)
GLUCOSE BLD STRIP.AUTO-MCNC: 108 MG/DL (ref 65–117)
GLUCOSE BLD STRIP.AUTO-MCNC: 112 MG/DL (ref 65–117)
GLUCOSE SERPL-MCNC: 107 MG/DL (ref 65–100)
GLUCOSE SERPL-MCNC: 98 MG/DL (ref 65–100)
MAGNESIUM SERPL-MCNC: 1.8 MG/DL (ref 1.6–2.4)
P-R INTERVAL, ECG05: 174 MS
PHOSPHATE SERPL-MCNC: 4.7 MG/DL (ref 2.6–4.7)
POTASSIUM SERPL-SCNC: 5.8 MMOL/L (ref 3.5–5.1)
POTASSIUM SERPL-SCNC: 6.3 MMOL/L (ref 3.5–5.1)
PROT SERPL-MCNC: 7.5 G/DL (ref 6.4–8.2)
Q-T INTERVAL, ECG07: 370 MS
QRS DURATION, ECG06: 80 MS
QTC CALCULATION (BEZET), ECG08: 447 MS
SERVICE CMNT-IMP: NORMAL
SODIUM SERPL-SCNC: 133 MMOL/L (ref 136–145)
SODIUM SERPL-SCNC: 136 MMOL/L (ref 136–145)
SODIUM UR-SCNC: 184 MMOL/L
VENTRICULAR RATE, ECG03: 88 BPM

## 2022-11-04 PROCEDURE — 36415 COLL VENOUS BLD VENIPUNCTURE: CPT

## 2022-11-04 PROCEDURE — 82088 ASSAY OF ALDOSTERONE: CPT

## 2022-11-04 PROCEDURE — 76937 US GUIDE VASCULAR ACCESS: CPT

## 2022-11-04 PROCEDURE — 74011636637 HC RX REV CODE- 636/637: Performed by: FAMILY MEDICINE

## 2022-11-04 PROCEDURE — 97161 PT EVAL LOW COMPLEX 20 MIN: CPT

## 2022-11-04 PROCEDURE — 84100 ASSAY OF PHOSPHORUS: CPT

## 2022-11-04 PROCEDURE — 74011000250 HC RX REV CODE- 250: Performed by: STUDENT IN AN ORGANIZED HEALTH CARE EDUCATION/TRAINING PROGRAM

## 2022-11-04 PROCEDURE — 82962 GLUCOSE BLOOD TEST: CPT

## 2022-11-04 PROCEDURE — 96376 TX/PRO/DX INJ SAME DRUG ADON: CPT

## 2022-11-04 PROCEDURE — 74011250637 HC RX REV CODE- 250/637: Performed by: INTERNAL MEDICINE

## 2022-11-04 PROCEDURE — 74011000250 HC RX REV CODE- 250: Performed by: FAMILY MEDICINE

## 2022-11-04 PROCEDURE — 96366 THER/PROPH/DIAG IV INF ADDON: CPT

## 2022-11-04 PROCEDURE — 80053 COMPREHEN METABOLIC PANEL: CPT

## 2022-11-04 PROCEDURE — 97530 THERAPEUTIC ACTIVITIES: CPT

## 2022-11-04 PROCEDURE — 93005 ELECTROCARDIOGRAM TRACING: CPT

## 2022-11-04 PROCEDURE — 83735 ASSAY OF MAGNESIUM: CPT

## 2022-11-04 PROCEDURE — 65660000001 HC RM ICU INTERMED STEPDOWN

## 2022-11-04 PROCEDURE — 97535 SELF CARE MNGMENT TRAINING: CPT

## 2022-11-04 PROCEDURE — 76770 US EXAM ABDO BACK WALL COMP: CPT

## 2022-11-04 PROCEDURE — 74011250637 HC RX REV CODE- 250/637: Performed by: STUDENT IN AN ORGANIZED HEALTH CARE EDUCATION/TRAINING PROGRAM

## 2022-11-04 PROCEDURE — 97165 OT EVAL LOW COMPLEX 30 MIN: CPT

## 2022-11-04 PROCEDURE — 74011000250 HC RX REV CODE- 250: Performed by: INTERNAL MEDICINE

## 2022-11-04 PROCEDURE — 74011250636 HC RX REV CODE- 250/636: Performed by: EMERGENCY MEDICINE

## 2022-11-04 PROCEDURE — 84244 ASSAY OF RENIN: CPT

## 2022-11-04 PROCEDURE — 74011250636 HC RX REV CODE- 250/636: Performed by: STUDENT IN AN ORGANIZED HEALTH CARE EDUCATION/TRAINING PROGRAM

## 2022-11-04 PROCEDURE — 84300 ASSAY OF URINE SODIUM: CPT

## 2022-11-04 PROCEDURE — 82570 ASSAY OF URINE CREATININE: CPT

## 2022-11-04 RX ORDER — DEXTROSE MONOHYDRATE 100 MG/ML
0-250 INJECTION, SOLUTION INTRAVENOUS AS NEEDED
Status: DISCONTINUED | OUTPATIENT
Start: 2022-11-04 | End: 2022-11-06 | Stop reason: HOSPADM

## 2022-11-04 RX ORDER — CALCIUM GLUCONATE 20 MG/ML
1 INJECTION, SOLUTION INTRAVENOUS ONCE
Status: COMPLETED | OUTPATIENT
Start: 2022-11-04 | End: 2022-11-04

## 2022-11-04 RX ORDER — ATORVASTATIN CALCIUM 20 MG/1
20 TABLET, FILM COATED ORAL
Status: DISCONTINUED | OUTPATIENT
Start: 2022-11-04 | End: 2022-11-06 | Stop reason: HOSPADM

## 2022-11-04 RX ORDER — LANOLIN ALCOHOL/MO/W.PET/CERES
325 CREAM (GRAM) TOPICAL
Status: DISCONTINUED | OUTPATIENT
Start: 2022-11-04 | End: 2022-11-06 | Stop reason: HOSPADM

## 2022-11-04 RX ORDER — CALCIUM GLUCONATE 94 MG/ML
1 INJECTION, SOLUTION INTRAVENOUS
Status: DISCONTINUED | OUTPATIENT
Start: 2022-11-04 | End: 2022-11-04

## 2022-11-04 RX ORDER — LISINOPRIL 40 MG/1
40 TABLET ORAL
Status: DISCONTINUED | OUTPATIENT
Start: 2022-11-04 | End: 2022-11-04

## 2022-11-04 RX ORDER — DILTIAZEM HYDROCHLORIDE 180 MG/1
360 CAPSULE, COATED, EXTENDED RELEASE ORAL DAILY
Status: DISCONTINUED | OUTPATIENT
Start: 2022-11-04 | End: 2022-11-06 | Stop reason: HOSPADM

## 2022-11-04 RX ORDER — SODIUM CHLORIDE 9 MG/ML
75 INJECTION, SOLUTION INTRAVENOUS CONTINUOUS
Status: DISCONTINUED | OUTPATIENT
Start: 2022-11-04 | End: 2022-11-04

## 2022-11-04 RX ORDER — TRIAMCINOLONE ACETONIDE 1 MG/G
CREAM TOPICAL 2 TIMES DAILY
Status: DISCONTINUED | OUTPATIENT
Start: 2022-11-04 | End: 2022-11-06 | Stop reason: HOSPADM

## 2022-11-04 RX ORDER — HYDRALAZINE HYDROCHLORIDE 20 MG/ML
10 INJECTION INTRAMUSCULAR; INTRAVENOUS
Status: DISCONTINUED | OUTPATIENT
Start: 2022-11-04 | End: 2022-11-06 | Stop reason: HOSPADM

## 2022-11-04 RX ORDER — IPRATROPIUM BROMIDE AND ALBUTEROL SULFATE 2.5; .5 MG/3ML; MG/3ML
3 SOLUTION RESPIRATORY (INHALATION)
Status: DISCONTINUED | OUTPATIENT
Start: 2022-11-04 | End: 2022-11-06 | Stop reason: HOSPADM

## 2022-11-04 RX ORDER — HYDROCHLOROTHIAZIDE 25 MG/1
50 TABLET ORAL DAILY
Status: DISCONTINUED | OUTPATIENT
Start: 2022-11-04 | End: 2022-11-06 | Stop reason: HOSPADM

## 2022-11-04 RX ORDER — GUAIFENESIN 600 MG/1
1200 TABLET, EXTENDED RELEASE ORAL EVERY 12 HOURS
Status: DISCONTINUED | OUTPATIENT
Start: 2022-11-04 | End: 2022-11-06 | Stop reason: HOSPADM

## 2022-11-04 RX ORDER — MONTELUKAST SODIUM 10 MG/1
10 TABLET ORAL DAILY
Status: DISCONTINUED | OUTPATIENT
Start: 2022-11-04 | End: 2022-11-06 | Stop reason: HOSPADM

## 2022-11-04 RX ORDER — SODIUM BICARBONATE 1 MEQ/ML
100 SYRINGE (ML) INTRAVENOUS ONCE
Status: COMPLETED | OUTPATIENT
Start: 2022-11-04 | End: 2022-11-04

## 2022-11-04 RX ORDER — INSULIN LISPRO 100 [IU]/ML
1-8 INJECTION, SOLUTION INTRAVENOUS; SUBCUTANEOUS
Status: DISCONTINUED | OUTPATIENT
Start: 2022-11-04 | End: 2022-11-06 | Stop reason: HOSPADM

## 2022-11-04 RX ORDER — MAGNESIUM SULFATE 100 %
16 CRYSTALS MISCELLANEOUS AS NEEDED
Status: DISCONTINUED | OUTPATIENT
Start: 2022-11-04 | End: 2022-11-06 | Stop reason: HOSPADM

## 2022-11-04 RX ADMIN — SODIUM BICARBONATE 100 MEQ: 84 INJECTION INTRAVENOUS at 11:44

## 2022-11-04 RX ADMIN — SODIUM CHLORIDE 75 ML/HR: 9 INJECTION, SOLUTION INTRAVENOUS at 06:00

## 2022-11-04 RX ADMIN — ATORVASTATIN CALCIUM 20 MG: 20 TABLET, FILM COATED ORAL at 21:17

## 2022-11-04 RX ADMIN — SODIUM ZIRCONIUM CYCLOSILICATE 10 G: 10 POWDER, FOR SUSPENSION ORAL at 11:44

## 2022-11-04 RX ADMIN — FERROUS SULFATE TAB 325 MG (65 MG ELEMENTAL FE) 325 MG: 325 (65 FE) TAB at 08:48

## 2022-11-04 RX ADMIN — GUAIFENESIN 1200 MG: 600 TABLET, EXTENDED RELEASE ORAL at 08:48

## 2022-11-04 RX ADMIN — MONTELUKAST 10 MG: 10 TABLET, FILM COATED ORAL at 08:48

## 2022-11-04 RX ADMIN — DILTIAZEM HYDROCHLORIDE 360 MG: 180 CAPSULE, COATED, EXTENDED RELEASE ORAL at 08:48

## 2022-11-04 RX ADMIN — DEXTROSE MONOHYDRATE 250 ML: 100 INJECTION, SOLUTION INTRAVENOUS at 00:35

## 2022-11-04 RX ADMIN — FAMOTIDINE 20 MG: 10 INJECTION, SOLUTION INTRAVENOUS at 08:48

## 2022-11-04 RX ADMIN — TRIAMCINOLONE ACETONIDE: 1 CREAM TOPICAL at 18:03

## 2022-11-04 RX ADMIN — CALCIUM GLUCONATE 1000 MG: 20 INJECTION, SOLUTION INTRAVENOUS at 00:34

## 2022-11-04 RX ADMIN — TRIAMCINOLONE ACETONIDE: 1 CREAM TOPICAL at 08:58

## 2022-11-04 RX ADMIN — SODIUM ZIRCONIUM CYCLOSILICATE 10 G: 10 POWDER, FOR SUSPENSION ORAL at 18:23

## 2022-11-04 RX ADMIN — SODIUM BICARBONATE 50 MEQ: 84 INJECTION, SOLUTION INTRAVENOUS at 00:29

## 2022-11-04 RX ADMIN — GUAIFENESIN 1200 MG: 600 TABLET, EXTENDED RELEASE ORAL at 21:17

## 2022-11-04 RX ADMIN — Medication 10 UNITS: at 00:30

## 2022-11-04 NOTE — PROGRESS NOTES
ATSP re: hyperkalemia. Review of chart shows that the patient was seen by Dr. Madeline Samson group in the past.  I asked the patient's nurse to contact his group.

## 2022-11-04 NOTE — H&P
PLEASE NOTE: I HAVE GENERATED THIS NOTE WITH THE ASSISTANCE OF VOICE-RECOGNITION TECHNOLOGY. PLEASE EXCUSE ANY SPELLING, GRAMMATICAL, AND SYNTAX ERRORS YOU MAY FIND. IF YOU NEED CLARIFICATION ON ANYTHING, PLEASE FEEL FREE TO REACH OUT TO ME.  [facility]  History and Physical - Dr. Minda Bhatt:     57 y.o. female with pertinent medical hx of nothing really pertinent presented with h abdominal pain; was found to have hyperkalemia. Vital signs are stable, potassium was up to 6.7, creatinine was initially 1.58, with a baseline of around 1 based off of her records in care everywhere. Imaging revealed: Nothing acute on CT abdomen pelvis, but the patient does have a liver hypodensity and nodule in the left lung base that needs follow-up, likely outpatient; has a moderate pericardial effusion that stable to slightly increased. 1. No acute abnormality is confirmed. Diverticulosis without diverticulitis. Stable postoperative change in the sigmoid colon. 2. Small shallow left anterior abdominal wall hernia at a probable previous  ostomy site, with no incarcerated bowel or abscess. 3. 2.4 x 1.7 cm hypodensity in the caudate lobe of the liver, not seen on prior  studies. While it measures water density and may represent a cyst, follow-up may  be helpful as it is new, also considering risk status. .  4. Lobular left upper renal pole cortex width CT scanner artifact, significance  uncertain. This may correspond with previously demonstrated hypodense masses. 5. Slight increase in what appears to be a left ovarian cyst.  6. 4 mm nodule in the left lung base possibly stable allowing for differences in  technique. Follow-up according to risk status. 7. Moderate pericardial effusion stable to slightly increased.     Active Problems:    Hyperkalemia (11/4/2022)      JESSE (acute kidney injury) ([facility] [address] (11/4/2022)        Plan:     Acute hyperkalemia  -Patient takes potassium supplements at home, I have advised her to stop them  -I am not going to aggressively treat her hyperkalemia right now-the patient seems to have been hyperkalemic for few months now, I do not want to rapidly decrease her potassium. She is already received multiple rounds of Lokelma and Kayexalate at the other facility  -I do believe that the patient should have an inpatient nephrology consult at this point, not so much to treat the hyperkalemia, but to see what level we can get her to safely.  -Since the patient is already got enough Lokelma and Kayexalate at the other facility, I am not going to hold her ACE inhibitor; #1 because of the benefit, number 2 weeks I do not want to drop her potassium too quickly    JESSE  -Likely due to poor p.o. intake because she was having abdominal pain, prerenal  -Fluid resuscitation  -We will also get bladder scan, renal ultrasound    CAD  -Continue home Accupril, Lipitor, aspirin    Hypertension  -I went to hold her home HCTZ;  -We will put on a as needed hydralazine    Diabetes  -Hold home metformin  -Sliding scale insulin    Morbid obesity  -Advise lifestyle changes    If code status was discussed with the patient, then code status entered as per the orders:                           Subjective:   Primary Care Provider: Mariah Palacios DO  Date of Service:  See Date Note Was Originated  Chief Complaint: Abdominal pain    57 y.o. female presents with 1 week duration of gradual onset, gradual worsening, severe, constant, nonradiating, sharp abdominal pain that progressed to the point that she could not sleep last night, but is not associated with any other symptoms, remitted by nothing, exacerbated by nothing. Review of Systems:  12 point ROS obtained and otherwise negative, except as per HPI and above.   Past Medical History:   Diagnosis Date    Anemia     Diabetes mellitus type II     Hypertension     Menopause       Past Surgical History:   Procedure Laterality Date    HX GI  2006     bowel surgery for infection s/p colostomy and then removal     HX PELVIC LAPAROSCOPY       Prior to Admission medications    Medication Sig Start Date End Date Taking? Authorizing Provider   dilTIAZem (TIAZAC) 360 mg ER capsule TAKE 1 CAPSULE BY MOUTH ONCE DAILY (DO NOT OPEN CRUSH OR CHEW SWALLOW WHOLE) 11/29/19  Yes Other, MD Lorena   hydroCHLOROthiazide (HYDRODIURIL) 50 mg tablet TAKE 1 TABLET BY MOUTH ONCE DAILY 1/14/20  Yes Other, MD Lorena   quinapril (ACCUPRIL) 40 mg tablet TAKE 1 TABLET BY MOUTH AT BEDTIME 12/9/19  Yes Other, MD Lorena   metFORMIN (GLUCOPHAGE) 1,000 mg tablet TAKE 1 TABLET BY MOUTH TWICE DAILY WITH MEALS 1/14/20  Yes Other, MD Lorena   guaiFENesin (MUCINEX) 1,200 mg Ta12 ER tablet Take 1 Tab by mouth two (2) times a day. 2/4/20  Yes Lore Hall MD   CHOLECALCIFEROL, VITAMIN D3, (VITAMIN D3 PO) Take 1 Tab by mouth daily. Yes Provider, Historical   atorvastatin (LIPITOR) 20 mg tablet Take 20 mg by mouth nightly. Yes Other, MD Lorena   ascorbic acid, vitamin C, (VITAMIN C) 500 mg tablet Take 500 mg by mouth daily. Yes Other, MD Lorena   montelukast (SINGULAIR) 10 mg tablet TAKE ONE TABLET BY MOUTH ONCE DAILY 3/14/18  Yes Fabian Bourne MD   ferrous sulfate 325 mg (65 mg iron) tablet Take 1 Tab by mouth Daily (before breakfast). Indications: IRON DEFICIENCY ANEMIA 8/12/16  Yes Sallye Eisenmenger, PA   albuterol (PROVENTIL HFA, VENTOLIN HFA, PROAIR HFA) 90 mcg/actuation inhaler Take 2 Puffs by inhalation every four (4) hours as needed for Wheezing or Shortness of Breath. 7/8/16  Yes Sallye Eisenmenger, PA   triamcinolone acetonide (KENALOG) 0.1 % topical cream Apply  to affected area two (2) times a day. use thin layer 8/8/21   Alisha Ghotra MD   acetaminophen (TYLENOL EXTRA STRENGTH) 500 mg tablet Take 1,000 mg by mouth every six (6) hours as needed for Pain.     Provider, Historical     No Known Allergies   Family History   Problem Relation Age of Onset Diabetes Mother     Breast Cancer Mother         age dx 63's    Hypertension Mother     Hypertension Sister     Breast Cancer Sister         age dx 52's    Diabetes Sister     Heart Disease Father     Hypertension Father     Stroke Maternal Grandfather     Stroke Paternal Grandfather     Breast Cancer Paternal Aunt     Breast Cancer Paternal Aunt    . Social History     Socioeconomic History    Marital status:    Tobacco Use    Smoking status: Former    Smokeless tobacco: Never   Substance and Sexual Activity    Alcohol use: No    Drug use: No   .  Objective:   Physical Exam:     VS as below    Const'l:          Morbidly obese body habitus, a&o, no acute distress  Head/Neck:       no cervical/head mass  Eyes:     nonicteric sclera, eom intact  ENT:      auditory acuity grossly intact either with or without device, no nasal deformity  Cardio:           Regular rate regular rhythm  Pulm:     no accessory muscle use  Abd:       S NT ND  Derm:     no rashes, no ulcers, no lesions  Extr:      No edema, no cyanosis, no calf tenderness, no varicosities  Neuro:    cn II-XII intact  Psych:   mood intact, judgement seems to have baseline impairment    Data Review: All diagnostic labs and studies have been reviewed.

## 2022-11-04 NOTE — PROGRESS NOTES
0725 Bedside and Verbal shift change report given to Nikole Tafoya, RN and Naomie Acevedo, RN (oncoming nurse) by Molly Marinelli, LIDYA (offgoing nurse). Report included the following information SBAR, Kardex, ED Summary, MAR, Recent Results, and Cardiac Rhythm NSR .     1715 Repeat K 5.8, Kavita Mann MD    End of Shift Note    Bedside shift change report given to Jocelyn SmithRN (oncoming nurse) by Idris Martinez RN and Naomie Acevedo RN (offgoing nurse). Report included the following information SBAR, Kardex, ED Summary, MAR, Recent Results, and Cardiac Rhythm NSR    Shift worked:  7a to 7p     Shift summary and any significant changes:     See above    Two new endurance catheters placed this shift, R upper arm and R forearm   Concerns for physician to address:  N/A     Zone phone for oncoming shift:           Activity:  Activity Level: Up with Assistance  Number times ambulated in hallways past shift: 0  Number of times OOB to chair past shift: 2    Cardiac:   Cardiac Monitoring: Yes      Cardiac Rhythm: Sinus Rhythm    Access:  Current line(s): PIV     Genitourinary:   Urinary status: voiding    Respiratory:   O2 Device: None (Room air)  Chronic home O2 use?: NO  Incentive spirometer at bedside: NO       GI:  Last Bowel Movement Date: 11/02/22 (per pt)  Current diet:  ADULT DIET Regular; 4 carb choices (60 gm/meal); Low Potassium (Less than 3000 mg/day)  Passing flatus: YES  Tolerating current diet: YES       Pain Management:   Patient states pain is manageable on current regimen: YES    Skin:  Tremaine Score: 18  Interventions: turn team, speciality bed, float heels, increase time out of bed, foam dressing, PT/OT consult, limit briefs, internal/external urinary devices, and nutritional support     Patient Safety:  Fall Score:  Total Score: 2  Interventions: bed/chair alarm, assistive device (walker, cane, etc), gripper socks, pt to call before getting OOB, stay with me (per policy), and gait belt  High Fall Risk: Yes    Length of Stay:  Expected LOS: 2d 14h  Actual LOS: 0      iForella Castellano RN

## 2022-11-04 NOTE — CONSULTS
Nephrology Consult Note     [facility]                Phone - [phone]   Patient: Jaskaran Tomlin   YOB: 1960    Date- 11/4/2022  MRN: [id number]             REASON FOR CONSULTATION: JESSE, hyperkalemia  CONSULTING PHYSICIAN:   ADMIT DATE:11/4/2022 PATIENT PCP:Ignacia Wiggins DO     IMPRESSION & PLAN:   JESSE stage III(secondary to poor p.o. intake)  Hyperkalemia  Abdominal pain  CAD  Hypertension  Type 2 diabetes  Morbid obesity    PLAN-  Creatinine already improving. DC IV fluids  DC lisinopril  Avoid using ACE or ARB as she continues to be hyperkalemic. Will restart HCTZ by tomorrow  Ordered for IV bicarb push and Lokelma  Low potassium diet  Recheck labs again at 2 PM  Renal ultrasound shows no hydro  Thank you for the consult       Active Problems:    Hyperkalemia (11/4/2022)      JESSE (acute kidney injury) ([facility] [address] (11/4/2022)        [] High complexity decision making was performed  [] Patient is at high-risk of decompensation with multiple organ involvement    Subjective:   HPI: Jaskaran Tomlin is a 57 y.o. female with past medical history significant for CAD, hypertension, type 2 diabetes, anemia, morbid obesity who came to the ED with complaints of abdominal pain. Initial work-up in ED showed her to be in JESSE with a creatinine elevated 1.5, baseline creatinine 1.0. She also found to be hyperkalemic with a K of 6.7. Patient has been taking lisinopril at home and also taking potassium supplements along with torsemide. Renal consult is requested for evaluation of JESSE and hyperkalemia. Review of Systems:       A 11 point review of system was performed today. Pertinent positives and negatives are mentioned in the HPI. The reminder of the ROS is negative and noncontributory.     Past Medical History:   Diagnosis Date    Anemia     Diabetes mellitus type II     Hypertension     Menopause       Past Surgical History:   Procedure Laterality Date HX GI  2006     bowel surgery for infection s/p colostomy and then removal     HX PELVIC LAPAROSCOPY        Prior to Admission medications    Medication Sig Start Date End Date Taking? Authorizing Provider   dilTIAZem (TIAZAC) 360 mg ER capsule TAKE 1 CAPSULE BY MOUTH ONCE DAILY (DO NOT OPEN CRUSH OR CHEW SWALLOW WHOLE) 11/29/19  Yes Other, MD Lorena   hydroCHLOROthiazide (HYDRODIURIL) 50 mg tablet TAKE 1 TABLET BY MOUTH ONCE DAILY 1/14/20  Yes Other, MD Lorena   quinapril (ACCUPRIL) 40 mg tablet TAKE 1 TABLET BY MOUTH AT BEDTIME 12/9/19  Yes Other, MD Lorena   metFORMIN (GLUCOPHAGE) 1,000 mg tablet TAKE 1 TABLET BY MOUTH TWICE DAILY WITH MEALS 1/14/20  Yes Other, MD Lorena   guaiFENesin (MUCINEX) 1,200 mg Ta12 ER tablet Take 1 Tab by mouth two (2) times a day. 2/4/20  Yes Candance Caffey, MD   CHOLECALCIFEROL, VITAMIN D3, (VITAMIN D3 PO) Take 1 Tab by mouth daily. Yes Provider, Historical   atorvastatin (LIPITOR) 20 mg tablet Take 20 mg by mouth nightly. Yes Other, MD Lorena   ascorbic acid, vitamin C, (VITAMIN C) 500 mg tablet Take 500 mg by mouth daily. Yes Other, MD Lorena   montelukast (SINGULAIR) 10 mg tablet TAKE ONE TABLET BY MOUTH ONCE DAILY 3/14/18  Yes Chelsy Bourne MD   ferrous sulfate 325 mg (65 mg iron) tablet Take 1 Tab by mouth Daily (before breakfast). Indications: IRON DEFICIENCY ANEMIA 8/12/16  Yes Marton Homans, PA   albuterol (PROVENTIL HFA, VENTOLIN HFA, PROAIR HFA) 90 mcg/actuation inhaler Take 2 Puffs by inhalation every four (4) hours as needed for Wheezing or Shortness of Breath. 7/8/16  Yes Marton Homans, PA   triamcinolone acetonide (KENALOG) 0.1 % topical cream Apply  to affected area two (2) times a day. use thin layer 8/8/21   Dipti Ghotra MD   acetaminophen (TYLENOL EXTRA STRENGTH) 500 mg tablet Take 1,000 mg by mouth every six (6) hours as needed for Pain.     Provider, Historical     No Known Allergies   Social History     Tobacco Use    Smoking status: Former    Smokeless tobacco: Never   Substance Use Topics    Alcohol use: No      Family History   Problem Relation Age of Onset    Diabetes Mother     Breast Cancer Mother         age dx 63's    Hypertension Mother     Hypertension Sister     Breast Cancer Sister         age dx 52's    Diabetes Sister     Heart Disease Father     Hypertension Father     Stroke Maternal Grandfather     Stroke Paternal Grandfather     Breast Cancer Paternal Aunt     Breast Cancer Paternal Aunt         Objective:    Patient Vitals for the past 24 hrs:   Temp Pulse Resp BP SpO2   11/04/22 1109 98 °F (36.7 °C) 92 18 (!) 150/71 --   11/04/22 0816 -- -- -- -- 98 %   11/04/22 0732 97.9 °F (36.6 °C) 89 15 (!) 134/47 98 %   11/04/22 0314 97.6 °F (36.4 °C) 89 18 (!) 148/54 99 %     No intake/output data recorded. There were no vitals filed for this visit. Physical Exam:  General:Alert, No distress,   Eyes:No scleral icterus, No conjunctival pallor  Neck:Supple,no mass palpable,no thyromegaly  Lungs:Clears to auscultation Bilaterally, normal respiratory effort  CVS:RRR, S1 S2 normal,  No rub,  Abdomen:Soft, Non tender, No hepatosplenomegaly  Extremities: No LE edema  Skin:No rash or lesions, Warm and DRY   : No catheter  Musculoskeletal : no redness, no joint tenderness  NEURO: Normal Speech, Non focal deficit       CODE STATUS: Full  Care Plan discussed with: Patient     Chart reviewed. Lab and Radiology Data Personally Reviewed: (see below)    Recent Labs     11/04/22  0435 11/03/22  2333 11/03/22  2100 11/03/22  1610 11/03/22  1424 11/03/22  1133     --   --   --  138 138   K 6.3* 6.7* 6.3*   < > 7.8* 6.6*     --   --   --  104 103   CO2 17*  --   --   --  22 20*   BUN 33*  --   --   --  42* 43*   CREA 1.20*  --   --   --  1.44* 1.58*   GLU 98  --   --   --  104* 126*   CA 9.6  --   --   --  8.9 9.5   MG 1.8  --   --   --   --   --    PHOS 4.7  --   --   --   --   --     < > = values in this interval not displayed. Recent Labs     11/03/22  1133   WBC 10.9   HGB 10.4*   HCT 33.7*   *     No results for input(s): FE, TIBC, PSAT, FERR in the last 72 hours. Lab Results   Component Value Date/Time    Hemoglobin A1c 6.8 (H) 03/25/2018 03:25 AM    Hemoglobin A1c 6.2 (H) 11/07/2017 10:49 AM    Hemoglobin A1c 6.4 (H) 08/05/2016 10:39 AM      Lab Results   Component Value Date/Time    Culture result: NO GROWTH 1 DAY 03/23/2018 11:30 PM     Lab Results   Component Value Date/Time    Microalb/Creat ratio (ug/mg creat.) 5.6 11/07/2017 10:49 AM     Lab Results   Component Value Date/Time    Color YELLOW/STRAW 11/03/2022 06:52 PM    Appearance CLEAR 11/03/2022 06:52 PM    Specific gravity 1.020 11/03/2022 06:52 PM    Specific gravity 1.019 03/24/2018 03:50 PM    pH (UA) 6.0 11/03/2022 06:52 PM    Protein Negative 11/03/2022 06:52 PM    Glucose Negative 11/03/2022 06:52 PM    Ketone Negative 11/03/2022 06:52 PM    Bilirubin Negative 11/03/2022 06:52 PM    Urobilinogen 0.2 11/03/2022 06:52 PM    Nitrites Negative 11/03/2022 06:52 PM    Leukocyte Esterase Negative 11/03/2022 06:52 PM    Epithelial cells FEW 03/24/2018 03:50 PM    Bacteria NEGATIVE 03/24/2018 03:50 PM    WBC 0-4 03/24/2018 03:50 PM    RBC 5-10 03/24/2018 03:50 PM     Lab Results   Component Value Date/Time    BNP 58 03/23/2018 10:29 PM     US Results (most recent):  Results from Hospital Encounter encounter on 11/04/22    US RETROPERITONEUM COMP    Narrative  Clinical indication: Possible hydronephrosis. Real-time ultrasonography of the kidneys and retroperitoneum. The right kidney appears normal measures 10.6 cm. The left kidney appears normal measuring 12.0 cm. There is no obstruction or  masses. Aorta and iliacs are poorly visualized due to gas. IVC appears normal.  The bladder appears normal.    Impression  No significant abnormality. Examination is slightly limited by body  habitus.      US RETROPERITONEUM COMP  Narrative: Clinical indication: Possible hydronephrosis. Real-time ultrasonography of the kidneys and retroperitoneum. The right kidney appears normal measures 10.6 cm. The left kidney appears normal measuring 12.0 cm. There is no obstruction or  masses. Aorta and iliacs are poorly visualized due to gas. IVC appears normal.  The bladder appears normal.  Impression: No significant abnormality. Examination is slightly limited by body  habitus. Prior to Admission Medications   Prescriptions Last Dose Informant Patient Reported? Taking? CHOLECALCIFEROL, VITAMIN D3, (VITAMIN D3 PO) 11/3/2022 Self Yes Yes   Sig: Take 1 Tab by mouth daily. acetaminophen (TYLENOL EXTRA STRENGTH) 500 mg tablet  Self Yes No   Sig: Take 1,000 mg by mouth every six (6) hours as needed for Pain. albuterol (PROVENTIL HFA, VENTOLIN HFA, PROAIR HFA) 90 mcg/actuation inhaler 11/3/2022 Self No Yes   Sig: Take 2 Puffs by inhalation every four (4) hours as needed for Wheezing or Shortness of Breath. ascorbic acid, vitamin C, (VITAMIN C) 500 mg tablet 11/3/2022 Self Yes Yes   Sig: Take 500 mg by mouth daily. atorvastatin (LIPITOR) 20 mg tablet 11/3/2022 Self Yes Yes   Sig: Take 20 mg by mouth nightly. dilTIAZem (TIAZAC) 360 mg ER capsule 11/3/2022  Yes Yes   Sig: TAKE 1 CAPSULE BY MOUTH ONCE DAILY (DO NOT OPEN CRUSH OR CHEW SWALLOW WHOLE)   ferrous sulfate 325 mg (65 mg iron) tablet 11/3/2022 Self No Yes   Sig: Take 1 Tab by mouth Daily (before breakfast). Indications: IRON DEFICIENCY ANEMIA   guaiFENesin (MUCINEX) 1,200 mg Ta12 ER tablet 11/3/2022  No Yes   Sig: Take 1 Tab by mouth two (2) times a day.    hydroCHLOROthiazide (HYDRODIURIL) 50 mg tablet 11/3/2022  Yes Yes   Sig: TAKE 1 TABLET BY MOUTH ONCE DAILY   metFORMIN (GLUCOPHAGE) 1,000 mg tablet 11/3/2022  Yes Yes   Sig: TAKE 1 TABLET BY MOUTH TWICE DAILY WITH MEALS   montelukast (SINGULAIR) 10 mg tablet 11/3/2022 Self No Yes   Sig: TAKE ONE TABLET BY MOUTH ONCE DAILY   quinapril (ACCUPRIL) 40 mg tablet 11/3/2022 Yes Yes   Sig: TAKE 1 TABLET BY MOUTH AT BEDTIME   triamcinolone acetonide (KENALOG) 0.1 % topical cream   No No   Sig: Apply  to affected area two (2) times a day. use thin layer      Facility-Administered Medications: None         Imaging:    Medications list Personally Reviewed   [x]      Yes     []               No    Thank you for allowing us to participate in the care this patient. We will follow patient with you.   Signed By: Eber Tinsley [facility]  [facility] [address], Elvia Chua  [place], [address] [facility]  Phone - [phone]         Fax - [phone] [address]  [id number] [place] [address]  Phone - [phone]        Fax - [phone]

## 2022-11-04 NOTE — PROGRESS NOTES
Transition of Care Plan:    RUR:14%  Disposition:home  Follow up appointments:PCP and Specialist DME needed:  Transportation at [address] [facility] (child) (transport arranged with insurance)  Keys or means to access home:   has access     IM Medicare Letter:1st letter signed and placed on chart  Is patient a  and connected with the VA[facility]? If yes, was [employer] transfer form completed and VA notified? Caregiver Magdi Dykes [phone]  Discharge Caregiver contacted prior to discharge? To be notified  Care Conference needed?:   no    CM spoke with patient, introduced self, explained role, verified demographics and offered assistance. Patient lives with daughter, son in law, granddaughter in a one level private residence with 2 steps. She does not need assistance with ADLs. She does not have DME. She uses [facility] [address] in [place]. CM will follow patient for DC needs. Reason for Admission:  abdominal pain                     RUR Score:    14%                 Plan for utilizing home health:     no     PCP: First and Last name:  Jeffrey Hunt DO     Name of Practice:    Are you a current patient: Yes/No: yes   Approximate date of last visit: Last Tuesday   Can you participate in a virtual visit with your PCP:                     Current Advanced Directive/Advance Care Plan: Prior      Healthcare Decision Maker:   Click here to complete [employer] including selection of the Healthcare Decision Maker Relationship (ie \"Primary\")                             Transition of Care Plan:      Care Management Interventions  Mode of Transport at Discharge:  Other (see comment) (family)  Support Systems: Spouse/Significant Other, Child(mallorie)  Discharge Location  Patient Expects to be Discharged to[de-identified] [facility]

## 2022-11-04 NOTE — ED NOTES
TRANSFER - OUT REPORT:    Verbal report given to Ailyn Moreno on [facility]  being transferred to [place] [address] for routine progression of care       Report consisted of patients Situation, Background, Assessment and   Recommendations(SBAR). Information from the following report(s) SBAR, ED Summary, [facility] and Recent Results was reviewed with the receiving nurse. Lines:   Peripheral IV 11/03/22 Right Antecubital (Active)        Opportunity for questions and clarification was provided.       Patient transported with:   VIJAY

## 2022-11-04 NOTE — PROGRESS NOTES
TRANSFER - IN REPORT:    Verbal report received from Jose RN(name) on Jearld Osgood  being received from [facility]) for routine progression of care      Report consisted of patients Situation, Background, Assessment and   Recommendations(SBAR). Information from the following report(s) SBAR was reviewed with the receiving nurse. Opportunity for questions and clarification was provided. Assessment completed upon patients arrival to unit and care assumed. [facility] [phone] to [facility].

## 2022-11-04 NOTE — PROGRESS NOTES
Endurance Catheter insertion note     Inserted 20 G, 8 cm long  Endurance Extended Dwell Peripheral Catheter into right forearm using ultrasound guidance and sterile technique. Positive blood return. Patient tolerated procedure well. Denies questions or concerns at this time. The Endurance catheter is an extended dwell peripheral catheter and may remain in place 29 days. Blood samples can be obtained from this catheter. To obtain labs, a tourniquet may be used, flush with 10ml NS, waste 3ml, draw labs, flush with 20ml NS. Dressings needs to be changed with central line dressing kit using bio patch and stat lock every 7 days by nurse. Primary Nurse notified.           Tobias Browning RN BSN, Vascular Access Team. PICC Nurse

## 2022-11-04 NOTE — PROGRESS NOTES
Problem: Falls - Risk of  Goal: *Absence of Falls  Description: Document John Barba Fall Risk and appropriate interventions in the flowsheet. Outcome: Progressing Towards Goal  Note: Fall Risk Interventions:  Mobility Interventions: Utilize walker, cane, or other assistive device, Utilize gait belt for transfers/ambulation, Strengthening exercises (ROM-active/passive)         Medication Interventions: Utilize gait belt for transfers/ambulation    Elimination Interventions:  Toilet paper/wipes in reach              Problem: Patient Education: Go to Patient Education Activity  Goal: Patient/Family Education  Outcome: Progressing Towards Goal

## 2022-11-04 NOTE — PROGRESS NOTES
PHYSICAL THERAPY EVALUATION/DISCHARGE  Patient: Osmar Chambers (18 y.o. female)  Date: 11/4/2022  Primary Diagnosis: Hyperkalemia [E87.5]  [facility] [N17.9]       Precautions: Fall risk         ASSESSMENT  Based on the objective data described below, the patient presents from home with abdominal pain for 1 week, admitted to ED. Pt received for PT session supine in bed, agreeable to therapy. Pt reports she feels she is at her \"normal mobility\" and demonstrated bed mobility mod I with extra time. Sit <> stands EOB with Supervision, no device. Pt walked up to 30ft without device, supervision for safety. Pt also demonstrated sit <> stands from commode with OT present, overall Supervision with grab bar. Pt states this is how she moves at home, pt safe to ambulate with nursing staff, no PT needs at this time. Will complete order. Functional Outcome Measure: The patient scored 75/100 on the Barthel Index outcome measure which is indicative of 25% impairment in mobility and ADLs. Other factors to consider for discharge: has family at home who provide assist.     Further skilled acute physical therapy is not indicated at this time. PLAN :  Recommendation for discharge: (in order for the patient to meet his/her long term goals)  No skilled physical therapy/ follow up rehabilitation needs identified at this time. This discharge recommendation:  Has been made in collaboration with the attending provider and/or case management    IF patient discharges home will need the following DME: patient owns DME required for discharge and none       SUBJECTIVE:   Patient stated This is how I normally do, mhmm. I have a cane but don't use it really.     OBJECTIVE DATA SUMMARY:   HISTORY:    Past Medical History:   Diagnosis Date    Anemia     Diabetes mellitus type II     Hypertension     Menopause      Past Surgical History:   Procedure Laterality Date    HX GI  2006     bowel surgery for infection s/p colostomy and then removal     HX PELVIC LAPAROSCOPY         Prior level of function: mod I with SPC vs. No AD  Personal factors and/or comorbidities impacting plan of care: complex medical history, good family support    Home Situation  Home Environment: Private residence  # Steps to Enter: 2  Rails to Enter: No  One/Two Story Residence: One story  Living Alone: No  Support Systems: Spouse/Significant Other, Child(mallorie)  Patient Expects to be Discharged to[de-identified] Home  Current DME Used/Available at Home: Cane, straight  Tub or Shower Type: Tub/Shower combination    EXAMINATION/PRESENTATION/DECISION MAKING:   Critical Behavior:  Neurologic State: Alert, Appropriate for age  Orientation Level: Oriented X4  Cognition: Appropriate decision making, Follows commands, Appropriate for age attention/concentration     Hearing: Auditory  Auditory Impairment: None  Skin:  B lower legs appear slightly red but no wounds noted  Edema: difficult to determine due to body habitus  Range Of Motion:  AROM: Within functional limits                       Strength:    Strength: Within functional limits                    Tone & Sensation:   Tone: Normal              Sensation: Intact               Coordination:  Coordination: Within functional limits  Vision:      Functional Mobility:  Bed Mobility:  Rolling: Modified independent  Supine to Sit: Modified independent     Scooting: Supervision  Transfers:  Sit to Stand: Supervision  Stand to Sit: Supervision        Bed to Chair: Supervision (bed > gourney for transport)              Balance:   Sitting: Intact  Standing: Impaired; Without support  Standing - Static: Good  Standing - Dynamic : Fair  Ambulation/Gait Training:  Distance (ft): 30 Feet (ft)     Ambulation - Level of Assistance: Supervision        Gait Abnormalities: Decreased step clearance;Shuffling gait;Trendelenburg;Trunk sway increased        Base of Support: Widened     Speed/Cammie: Slow;Shuffled  Step Length: Right shortened;Left shortened               Functional Measure:  Barthel Index:    Bathin  Bladder: 10  Bowels: 10  Groomin  Dressin  Feeding: 10  Mobility: 10  Stairs: 5  Toilet Use: 5  Transfer (Bed to Chair and Back): 10  Total: 75/100       The Barthel ADL Index: Guidelines  1. The index should be used as a record of what a patient does, not as a record of what a patient could do. 2. The main aim is to establish degree of independence from any help, physical or verbal, however minor and for whatever reason. 3. The need for supervision renders the patient not independent. 4. A patient's performance should be established using the best available evidence. Asking the patient, friends/relatives and nurses are the usual sources, but direct observation and common sense are also important. However direct testing is not needed. 5. Usually the patient's performance over the preceding 24-48 hours is important, but occasionally longer periods will be relevant. 6. Middle categories imply that the patient supplies over 50 per cent of the effort. 7. Use of aids to be independent is allowed. Score Interpretation (from [address] [address])    Independent   60-79 Minimally independent   40-59 Partially dependent   20-39 Very dependent   <20 Totally dependent     -Vashti Goodwin., Barthel, D.W. (1965). Functional evaluation: the Barthel Index. [address] [facility] ([address] (1997). The Barthel activities of daily living index: self-reporting versus actual performance in the old (> or = 75 years). Journal of [address] [place] [address] 45(7), [address] [facility], J.J.M.F, Ramses Bowser., Rosanna Holder. (1999). Measuring the change in disability after inpatient rehabilitation; comparison of the responsiveness of the Barthel Index and Functional [place] Measure. Journal of Neurology, Neurosurgery, and Psychiatry, 66(4), 940-549.   JOSE Carlin, MARISA Chase, & Zenaida Robles M.A. (2004) Assessment of post-stroke quality of life in cost-effectiveness studies: The usefulness of the Barthel Index and the EuroQoL-5D. Quality of Life Research, 15, 148-46           Physical Therapy Evaluation Charge Determination   History Examination Presentation Decision-Making   HIGH Complexity :3+ comorbidities / personal factors will impact the outcome/ POC  LOW Complexity : 1-2 Standardized tests and measures addressing body structure, function, activity limitation and / or participation in recreation  LOW Complexity : Stable, uncomplicated  LOW Complexity : FOTO score of       Based on the above components, the patient evaluation is determined to be of the following complexity level: LOW     Pain Rating:  Denies pain    Activity Tolerance: WNL- at her baseline      After treatment patient left in no apparent distress: On stretcher with transport preparing to take pt to ultrasound, RN present. COMMUNICATION/EDUCATION:   The patients plan of care was discussed with: Occupational therapist, Registered nurse, and Case management. Fall prevention education was provided and the patient/caregiver indicated understanding.     Thank you for this referral.  Minda Sol, PT, DPT, NCS   Time Calculation: 14 mins

## 2022-11-04 NOTE — PROGRESS NOTES
OCCUPATIONAL THERAPY EVALUATION/DISCHARGE  Patient: Jaskaran Tomlin (58 y.o. female)  Date: 11/4/2022  Primary Diagnosis: Hyperkalemia [E87.5]  JESSE (acute kidney injury) ([facility] [N17.9]       Precautions:        ASSESSMENT  Based on the objective data described below, the patient presents with decreased endurance, strength, functional mobility, ADLs and obese. Pt was living at home with family and stated that she was independent with ADLs and ILS and used a cane at times. She was supine in bed and able to come supine to sit with min of 1. She reports that she is able to dress LB at home with no assist but was not able to ramu socks sitting on EOb due to  body habitus. Pt was SBA to stand and SBA to supervision for transfer to bathroom to toilet. She was independent with cleaning self and able to walk in room with SBA to supervision. Feel that pt Is at the baseline of independence  and no OT needs at this time. .    Current Level of Function (ADLs/self-care): max for socks and setup for rest of ADLs     Functional Outcome Measure: The patient scored 75/100 on the Barthel outcome measure which is indicative of max for socks and rest setup . PLAN :  Recommend with staff: OOB for meals and use toilet in bathroom     Recommendation for discharge: (in order for the patient to meet his/her long term goals)  No skilled occupational therapy/ follow up rehabilitation needs identified at this time. This discharge recommendation:  Has been made in collaboration with the attending provider and/or case management    IF patient discharges home will need the following DME: none       SUBJECTIVE:   Patient stated I have family at home that can help.  Joe Baker    OBJECTIVE DATA SUMMARY:   HISTORY:   Past Medical History:   Diagnosis Date    Anemia     Diabetes mellitus type II     Hypertension     Menopause      Past Surgical History:   Procedure Laterality Date    HX GI  2006     bowel surgery for infection s/p colostomy and then removal     HX PELVIC LAPAROSCOPY         Prior Level of Function/Environment/Context: pt lives at home with family and was independent with ADLs and ILS she reports and uses a cane at times. Expanded or extensive additional review of patient history:   Home Situation  Home Environment: Private residence  # Steps to Enter: 2  Rails to Enter: No  One/Two Story Residence: One story  Living Alone: No  Support Systems: Spouse/Significant Other, Child(mallorie)  Patient Expects to be Discharged to[de-identified] Home  Current DME Used/Available at Home: Cane, straight  Tub or Shower Type: Tub/Shower combination    Hand dominance: Right    EXAMINATION OF PERFORMANCE DEFICITS:  Cognitive/Behavioral Status:  Neurologic State: Alert  Orientation Level: Appropriate for age  Cognition: Appropriate decision making  Perception: Appears intact  Perseveration: No perseveration noted  Safety/Judgement: Awareness of environment    Skin: in fair health     Edema: none noted    Hearing: Auditory  Auditory Impairment: None    Vision/Perceptual:                 Intact                     Range of Motion:    AROM: Within functional limits                         Strength:    Strength: Within functional limits                Coordination:  Coordination: Within functional limits  Fine Motor Skills-Upper: Left Intact; Right Intact    Gross Motor Skills-Upper: Left Intact; Right Intact    Tone & Sensation:    Tone: Normal  Sensation: Intact                      Balance:  Sitting: Intact  Standing: Impaired; Without support  Standing - Static: Good  Standing - Dynamic : Fair    Functional Mobility and Transfers for ADLs:  Bed Mobility:  Rolling: Modified independent  Supine to Sit: Modified independent  Scooting: Supervision    Transfers:  Sit to Stand: Supervision  Stand to Sit: Supervision  Bed to Chair: Supervision (bed > gourney for transport)  Bathroom Mobility: Stand-by assistance;Supervision/set up  Toilet Transfer : Supervision    ADL Assessment:  Feeding: Independent    Oral Facial Hygiene/Grooming: Independent    Bathing: Maximum assistance;Minimum assistance (with feet)    Type of Bath: Chlorhexidine (CHG)    Upper Body Dressing: Setup    Lower Body Dressing: Maximum assistance    Toileting: Supervision           Type of Bath: Chlorhexidine (CHG)       Cognitive Retraining  Safety/Judgement: Awareness of environment      Functional Measure:    Barthel Index:  Bathin  Bladder: 10  Bowels: 10  Groomin  Dressin  Feeding: 10  Mobility: 10  Stairs: 5  Toilet Use: 5  Transfer (Bed to Chair and Back): 10  Total: 75/100      The Barthel ADL Index: Guidelines  1. The index should be used as a record of what a patient does, not as a record of what a patient could do. 2. The main aim is to establish degree of independence from any help, physical or verbal, however minor and for whatever reason. 3. The need for supervision renders the patient not independent. 4. A patient's performance should be established using the best available evidence. Asking the patient, friends/relatives and nurses are the usual sources, but direct observation and common sense are also important. However direct testing is not needed. 5. Usually the patient's performance over the preceding 24-48 hours is important, but occasionally longer periods will be relevant. 6. Middle categories imply that the patient supplies over 50 per cent of the effort. 7. Use of aids to be independent is allowed. Score Interpretation (from [address] [place] [address])    Independent   60-79 Minimally independent   40-59 Partially dependent   20-39 Very dependent   <20 Totally dependent     -Vashti Goodwin., Barthel, D.W. (1965). Functional evaluation: the Barthel Index. [address] [facility] ([address] (1997). The Barthel activities of daily living index: self-reporting versus actual performance in the old (> or = 75 years). Journal of [address] [facility] 45(9), 818-457. TIN Puentes, Teresita Navarro., Umang Bustillos., Mercy Callahan. (1999). Measuring the change in disability after inpatient rehabilitation; comparison of the responsiveness of the Barthel Index and Functional [place] Measure. Journal of Neurology, Neurosurgery, and Psychiatry, 66(4), 301-109. JOSE Gamboa, MARISA Chase, & Laura Rob M.A. (2004) Assessment of post-stroke quality of life in cost-effectiveness studies: The usefulness of the Barthel Index and the EuroQoL-5D. Quality of Life Research, 15, 705-19         Occupational Therapy Evaluation Charge Determination   History Examination Decision-Making   MEDIUM Complexity : Expanded review of history including physical, cognitive and psychosocial  history  MEDIUM Complexity : 3-5 performance deficits relating to physical, cognitive , or psychosocial skils that result in activity limitations and / or participation restrictions LOW Complexity : No comorbidities that affect functional and no verbal or physical assistance needed to complete eval tasks       Based on the above components, the patient evaluation is determined to be of the following complexity level: LOW   Pain Rating:  none    Activity Tolerance:   Fair    After treatment patient left in no apparent distress:    Pt was on stretcher with transport at end of session     COMMUNICATION/EDUCATION:   The patients plan of care was discussed with: Physical therapist and Registered nurse.      Thank you for this referral.  Elvira Garsia OT  Time Calculation: 16 mins

## 2022-11-04 NOTE — PROGRESS NOTES
Chart reviewed. 58 yo F adm for abdominal pain found to have hyperkalemia, JESSE. She is sitting in chair bedside, returned from [address]. No complaints at time of encounter, states pain is resolved. Understands plan for nephrology evaluation. Discussed code status, she expressed wish to be Full code. Order placed in epic.

## 2022-11-05 LAB
ANION GAP SERPL CALC-SCNC: 10 MMOL/L (ref 5–15)
BUN SERPL-MCNC: 20 MG/DL (ref 6–20)
BUN/CREAT SERPL: 19 (ref 12–20)
CALCIUM SERPL-MCNC: 9.5 MG/DL (ref 8.5–10.1)
CHLORIDE SERPL-SCNC: 102 MMOL/L (ref 97–108)
CO2 SERPL-SCNC: 22 MMOL/L (ref 21–32)
CREAT SERPL-MCNC: 1.03 MG/DL (ref 0.55–1.02)
ERYTHROCYTE [DISTWIDTH] IN BLOOD BY AUTOMATED COUNT: 17.1 % (ref 11.5–14.5)
GLUCOSE BLD STRIP.AUTO-MCNC: 110 MG/DL (ref 65–117)
GLUCOSE BLD STRIP.AUTO-MCNC: 111 MG/DL (ref 65–117)
GLUCOSE BLD STRIP.AUTO-MCNC: 114 MG/DL (ref 65–117)
GLUCOSE BLD STRIP.AUTO-MCNC: 140 MG/DL (ref 65–117)
GLUCOSE SERPL-MCNC: 108 MG/DL (ref 65–100)
HCT VFR BLD AUTO: 30.6 % (ref 35–47)
HGB BLD-MCNC: 9.9 G/DL (ref 11.5–16)
MCH RBC QN AUTO: 27.1 PG (ref 26–34)
MCHC RBC AUTO-ENTMCNC: 32.4 G/DL (ref 30–36.5)
MCV RBC AUTO: 83.8 FL (ref 80–99)
NRBC # BLD: 0 K/UL (ref 0–0.01)
NRBC BLD-RTO: 0 PER 100 WBC
PLATELET # BLD AUTO: 396 K/UL (ref 150–400)
PMV BLD AUTO: 11 FL (ref 8.9–12.9)
POTASSIUM SERPL-SCNC: 4.9 MMOL/L (ref 3.5–5.1)
RBC # BLD AUTO: 3.65 M/UL (ref 3.8–5.2)
SERVICE CMNT-IMP: ABNORMAL
SERVICE CMNT-IMP: NORMAL
SODIUM SERPL-SCNC: 134 MMOL/L (ref 136–145)
WBC # BLD AUTO: 7.7 K/UL (ref 3.6–11)

## 2022-11-05 PROCEDURE — 74011250636 HC RX REV CODE- 250/636: Performed by: INTERNAL MEDICINE

## 2022-11-05 PROCEDURE — 82962 GLUCOSE BLOOD TEST: CPT

## 2022-11-05 PROCEDURE — 36415 COLL VENOUS BLD VENIPUNCTURE: CPT

## 2022-11-05 PROCEDURE — 85027 COMPLETE CBC AUTOMATED: CPT

## 2022-11-05 PROCEDURE — 74011000250 HC RX REV CODE- 250: Performed by: INTERNAL MEDICINE

## 2022-11-05 PROCEDURE — 74011250637 HC RX REV CODE- 250/637: Performed by: INTERNAL MEDICINE

## 2022-11-05 PROCEDURE — 65660000001 HC RM ICU INTERMED STEPDOWN

## 2022-11-05 PROCEDURE — 80048 BASIC METABOLIC PNL TOTAL CA: CPT

## 2022-11-05 PROCEDURE — 74011250637 HC RX REV CODE- 250/637: Performed by: STUDENT IN AN ORGANIZED HEALTH CARE EDUCATION/TRAINING PROGRAM

## 2022-11-05 RX ORDER — ENOXAPARIN SODIUM 100 MG/ML
30 INJECTION SUBCUTANEOUS EVERY 12 HOURS
Status: DISCONTINUED | OUTPATIENT
Start: 2022-11-05 | End: 2022-11-06 | Stop reason: HOSPADM

## 2022-11-05 RX ORDER — SODIUM BICARBONATE 650 MG/1
650 TABLET ORAL 3 TIMES DAILY
Status: DISCONTINUED | OUTPATIENT
Start: 2022-11-05 | End: 2022-11-06 | Stop reason: HOSPADM

## 2022-11-05 RX ADMIN — TRIAMCINOLONE ACETONIDE: 1 CREAM TOPICAL at 10:14

## 2022-11-05 RX ADMIN — FERROUS SULFATE TAB 325 MG (65 MG ELEMENTAL FE) 325 MG: 325 (65 FE) TAB at 10:13

## 2022-11-05 RX ADMIN — MONTELUKAST 10 MG: 10 TABLET, FILM COATED ORAL at 10:13

## 2022-11-05 RX ADMIN — SODIUM ZIRCONIUM CYCLOSILICATE 10 G: 10 POWDER, FOR SUSPENSION ORAL at 10:13

## 2022-11-05 RX ADMIN — TRIAMCINOLONE ACETONIDE: 1 CREAM TOPICAL at 17:56

## 2022-11-05 RX ADMIN — SODIUM BICARBONATE: 84 INJECTION, SOLUTION INTRAVENOUS at 10:12

## 2022-11-05 RX ADMIN — ATORVASTATIN CALCIUM 20 MG: 20 TABLET, FILM COATED ORAL at 21:06

## 2022-11-05 RX ADMIN — ENOXAPARIN SODIUM 30 MG: 100 INJECTION SUBCUTANEOUS at 21:06

## 2022-11-05 RX ADMIN — SODIUM BICARBONATE 650 MG: 650 TABLET ORAL at 21:06

## 2022-11-05 RX ADMIN — GUAIFENESIN 1200 MG: 600 TABLET, EXTENDED RELEASE ORAL at 21:06

## 2022-11-05 RX ADMIN — SODIUM BICARBONATE 650 MG: 650 TABLET ORAL at 10:13

## 2022-11-05 RX ADMIN — ENOXAPARIN SODIUM 30 MG: 100 INJECTION SUBCUTANEOUS at 10:13

## 2022-11-05 RX ADMIN — DILTIAZEM HYDROCHLORIDE 360 MG: 180 CAPSULE, COATED, EXTENDED RELEASE ORAL at 10:13

## 2022-11-05 RX ADMIN — GUAIFENESIN 1200 MG: 600 TABLET, EXTENDED RELEASE ORAL at 10:13

## 2022-11-05 RX ADMIN — SODIUM BICARBONATE 650 MG: 650 TABLET ORAL at 17:55

## 2022-11-05 NOTE — PROGRESS NOTES
Anticoagulation Dosing    Indication:  DVT ppx    Estimated Creatinine Clearance: 75.3 mL/min (A) (based on SCr of 1.15 mg/dL (H)). Estimated Creatinine Clearance (using IBW):45.6 mL/min    Recent Labs     11/05/22  0442 11/04/22  1415 11/04/22  0435 11/03/22  1424 11/03/22  1133   CREA  --  1.15* 1.20* 1.44* 1.58*   ALB  --   --  3.9  --  3.6   HGB 9.9*  --   --   --  10.4*   HCT 30.6*  --   --   --  33.7*     --   --   --  561*         Wt Readings from Last 1 Encounters:   11/03/22 149.7 kg (330 lb)     Ht Readings from Last 1 Encounters:   11/03/22 165.1 cm (65\")     There is no height or weight on file to calculate BMI. Plan:  Lovenox 40mg SQ q24h adjusted to 30mg q12h due to patient's renal function and wt > 60kg.            Thank you,  Diane Khoury, [facility]

## 2022-11-05 NOTE — PROGRESS NOTES
Anticoagulation Dosing    Indication:  DVT ppx    Estimated Creatinine Clearance: 75.3 mL/min (A) (based on SCr of 1.15 mg/dL (H)). Estimated Creatinine Clearance (using IBW):45.6 mL/min    Recent Labs     11/05/22  0442 11/04/22  1415 11/04/22  0435 11/03/22  1424 11/03/22  1133   CREA  --  1.15* 1.20* 1.44* 1.58*   ALB  --   --  3.9  --  3.6   HGB 9.9*  --   --   --  10.4*   HCT 30.6*  --   --   --  33.7*     --   --   --  561*         Wt Readings from Last 1 Encounters:   11/03/22 149.7 kg (330 lb)     Ht Readings from Last 1 Encounters:   11/03/22 165.1 cm (65\")     There is no height or weight on file to calculate BMI. Plan:  Lovenox 40mg SQ q24h adjusted to 30mg q12h due to patient's renal function and wt > 60kg.            Thank you,  Juliette Lanier, [facility]

## 2022-11-05 NOTE — PROGRESS NOTES
Hospitalist Progress Note    NAME: Mariusz Villaseñor   :  1960   MRN:  [id number]       Assessment / Plan:  Hyperkalemia - appreciate nephro input; dc ACEI; HCTZ, lokelma, sodium bicarb  JESSE - improving; appreciate nephro input  Abdominal pain - chronic, has been evaluated outpatient for hernia; does not require pain medications outpatient  CAD  HTN  DM2 - ssi  Morbid obesity - BMI 54.91  VTE prophylaxis - lovenox  Dispo - plan to dc to home once stable    Estimated discharge date:   Barriers: nephro clearance    Code status: Full  Prophylaxis: Lovenox     Subjective:     Patient was seen and examined. No acute events overnight. Laying in bed, states she is having abdominal pain. Has had similar symptoms off/on for years, been told it is a hernia but nothing needs to be done. Does not take pain medication for it at home. Discussed with RN overnight events. All patient's questions were answered. Review of Systems:  Symptom Y/N Comments  Symptom Y/N Comments   Fever/Chills n   Chest Pain n    Poor Appetite    Edema     Cough n   Abdominal Pain y chronic   Sputum    Joint Pain     SOB/GALLARDO n   Pruritis/Rash     Nausea/vomit n   Tolerating PT/OT     Diarrhea    Tolerating Diet y    Constipation    Other       Could NOT obtain due to:          Objective:     VITALS:   Last 24hrs VS reviewed since prior progress note. Most recent are:  Patient Vitals for the past 24 hrs:   Temp Pulse Resp BP SpO2   22 2230 98.2 °F (36.8 °C) 87 18 114/65 91 %   22 1930 98 °F (36.7 °C) 93 18 (!) 158/66 93 %   22 1502 98.3 °F (36.8 °C) 85 18 (!) 155/54 90 %   22 1109 98 °F (36.7 °C) 92 18 (!) 150/71 --   22 0816 -- -- -- -- 98 %     No intake or output data in the 24 hours ending 22 0753     I had a face to face encounter and independently examined this patient on 2022, as outlined below:  PHYSICAL EXAM:  General: WD, WN. Alert, cooperative, no acute distress    EENT:  EOMI. Anicteric sclerae. MMM  Resp:  CTA bilaterally, no wheezing or rales. No accessory muscle use  CV:  Regular  rhythm,  No edema  GI:  Soft, Non distended, Non tender. +Bowel sounds  Neurologic:  EOMs intact. No facial asymmetry. No aphasia or slurred speech. Symmetrical strength, Sensation grossly intact. Alert and oriented X 4.     Psych:   Good insight. Not anxious nor agitated  Skin:  No rashes. No jaundice    Reviewed most current lab test results and cultures  YES  Reviewed most current radiology test results   YES  Review and summation of old records today    NO  Reviewed patient's current orders and MAR    YES  PMH/SH reviewed - no change compared to H&P  ________________________________________________________________________  Care Plan discussed with:    Comments   Patient X    Family      RN X    Care Manager     Consultant                       X Multidiciplinary team rounds were held today with , nursing, pharmacist and clinical coordinator. Patient's plan of care was discussed; medications were reviewed and discharge planning was addressed. ________________________________________________________________________        Comments   >50% of visit spent in counseling and coordination of care X    ________________________________________________________________________  Amandeep Garcia, DO     Procedures: see electronic medical records for all procedures/Xrays and details which were not copied into this note but were reviewed prior to creation of Plan. LABS:  I reviewed today's most current labs and imaging studies.   Pertinent labs include:  Recent Labs     11/05/22  0442 11/03/22  1133   WBC 7.7 10.9   HGB 9.9* 10.4*   HCT 30.6* 33.7*    561*     Recent Labs     11/04/22  1415 11/04/22  0435 11/03/22  2333 11/03/22  1610 11/03/22  1424 11/03/22  1133   * 136  --   --  138 138   K 5.8* 6.3* 6.7*   < > 7.8* 6.6*    106  --   --  104 103   CO2 21 17*  --   --  22 20* * 98  --   --  104* 126*   BUN 26* 33*  --   --  42* 43*   CREA 1.15* 1.20*  --   --  1.44* 1.58*   CA 9.2 9.6  --   --  8.9 9.5   MG  --  1.8  --   --   --   --    PHOS  --  4.7  --   --   --   --    ALB  --  3.9  --   --   --  3.6   TBILI  --  0.8  --   --   --  0.4   ALT  --  14  --   --   --  13    < > = values in this interval not displayed.        Signed: Kalin Cea, DO

## 2022-11-05 NOTE — PROGRESS NOTES
Fadi Carvalho  YOB: 1960      Assessment & Plan: JESSE likely d/t prerenal stat  Hyperkalemia : multifactorial : d/t med's (ACE-I), type IV RTA and possible decrease distal sodium water delivery  NAGMA    -Cr/GFR better  -Give sodium bicarb 1L bag @100 CC/hr  -start Lokelma 10 gm daily for 4 days  -start NAHC03 650 mg TIDWM  -low K diet  -daily labs          Subjective:   CC: F/U Hyperkalemia  HPI: Patient seen, no cp/sob   ROS:  Current Facility-Administered Medications   Medication Dose Route Frequency    sodium zirconium cyclosilicate (LOKELMA) powder packet 10 g  10 g Oral DAILY    sodium bicarbonate tablet 650 mg  650 mg Oral TID    enoxaparin (LOVENOX) injection 40 mg  40 mg SubCUTAneous Q24H    ferrous sulfate tablet 325 mg  325 mg Oral ACB    montelukast (SINGULAIR) tablet 10 mg  10 mg Oral DAILY    atorvastatin (LIPITOR) tablet 20 mg  20 mg Oral QHS    [Held by provider] hydroCHLOROthiazide (HYDRODIURIL) tablet 50 mg  50 mg Oral DAILY    triamcinolone acetonide (KENALOG) 0.1 % cream   Topical BID    albuterol-ipratropium (DUO-NEB) 2.5 MG-0.5 MG/3 ML  3 mL Nebulization Q4H PRN    guaiFENesin ER (MUCINEX) tablet 1,200 mg  1,200 mg Oral Q12H    dilTIAZem ER (CARDIZEM CD) capsule 360 mg  360 mg Oral DAILY    hydrALAZINE (APRESOLINE) 20 mg/mL injection 10 mg  10 mg IntraVENous Q6H PRN    glucagon (GLUCAGEN) injection 1 mg  1 mg IntraMUSCular PRN    glucose chewable tablet 16 g  16 g Oral PRN    insulin lispro (HUMALOG) injection 1-8 Units  1-8 Units SubCUTAneous AC&HS    dextrose 10% infusion 0-250 mL  0-250 mL IntraVENous PRN          Objective:     Vitals:  Blood pressure (!) 145/65, pulse 77, temperature 98.2 °F (36.8 °C), resp. rate 18, SpO2 91 %. Temp (24hrs), Av.1 °F (36.7 °C), Min:98 °F (36.7 °C), Max:98.3 °F (36.8 °C)      Intake and Output:  No intake/output data recorded.    1901 -  0700  In: -   Out: 400 [Urine:400]    Physical Exam:                   Physical Examination:   GENERAL ASSESSMENT: NAD  HEENT:Nontraumatic   CHEST: CTA  HEART: S1S2  ABDOMEN: Soft,NT,  :[place]:   EXTREMITY: no EDEMA  NEURO:Grossly non focal          ECG/rhythm:    Data Review      No results for input(s): TNIPOC in the last 72 hours. No lab exists for component: ITNL   No results for input(s): CPK, CKMB, TROIQ in the last 72 hours. Recent Labs     11/05/22  0442 11/04/22  1415 11/04/22  0435 11/03/22  2333 11/03/22  1610 11/03/22  1424 11/03/22  1133   NA  --  133* 136  --   --  138 138   K  --  5.8* 6.3* 6.7*   < > 7.8* 6.6*   CL  --  104 106  --   --  104 103   CO2  --  21 17*  --   --  22 20*   BUN  --  26* 33*  --   --  42* 43*   CREA  --  1.15* 1.20*  --   --  1.44* 1.58*   GLU  --  107* 98  --   --  104* 126*   PHOS  --   --  4.7  --   --   --   --    MG  --   --  1.8  --   --   --   --    CA  --  9.2 9.6  --   --  8.9 9.5   ALB  --   --  3.9  --   --   --  3.6   WBC 7.7  --   --   --   --   --  10.9   HGB 9.9*  --   --   --   --   --  10.4*   HCT 30.6*  --   --   --   --   --  33.7*     --   --   --   --   --  561*    < > = values in this interval not displayed. No results for input(s): INR, PTP, APTT, INREXT in the last 72 hours. Needs: urine analysis, urine sodium, protein and creatinine  Lab Results   Component Value Date/Time    Sodium,urine random 184 11/04/2022 06:22 AM    Creatinine, urine random 79.90 11/04/2022 06:22 AM               Discussed with:   patient     : Macrina Russo MD  11/5/2022        [facility]:  [email or web address]   [place] office:  [address] [place] [address], [address] [place] [address] [address]  [place], [address] [place] [address]  Phone: [phone]  Fax :     [phone]    [facility] office:  [address] [facility], [address]  Phone - [phone]  Fax - [phone]

## 2022-11-05 NOTE — PROGRESS NOTES
0740 Bedside and Verbal shift change report given to Jonathan Spencer Marichuy and Gerda RN (oncoming nurse) by [employer] (offgoing nurse). Report included the following information SBAR, Kardex, ED Summary, MAR, Recent Results, and Cardiac Rhythm NSR . End of Shift Note    Bedside shift change report given to [employer] (oncoming nurse) by Yessy Alberto RN (offgoing nurse). Report included the following information SBAR, Kardex, ED Summary, MAR, Recent Results, and Cardiac Rhythm NSR    Shift worked:  7a to 7p     Shift summary and any significant changes:     OOB to chair x2 this shift     Concerns for physician to address:  N/A     Zone phone for oncoming shift:           Activity:  Activity Level: Up with Assistance  Number times ambulated in hallways past shift: 0  Number of times OOB to chair past shift: 2    Cardiac:   Cardiac Monitoring: Yes      Cardiac Rhythm: Sinus Rhythm    Access:  Current line(s): PIV     Genitourinary:   Urinary status: voiding    Respiratory:   O2 Device: None (Room air)  Chronic home O2 use?: NO  Incentive spirometer at bedside: NO       GI:  Last Bowel Movement Date: 11/02/22  Current diet:  ADULT DIET Regular; 4 carb choices (60 gm/meal); Low Potassium (Less than 3000 mg/day)  Passing flatus: YES  Tolerating current diet: YES       Pain Management:   Patient states pain is manageable on current regimen: YES    Skin:  Tremaine Score: 19  Interventions: turn team, speciality bed, float heels, increase time out of bed, foam dressing, PT/OT consult, limit briefs, internal/external urinary devices, and nutritional support     Patient Safety:  Fall Score:  Total Score: 2  Interventions: bed/chair alarm, assistive device (walker, cane, etc), gripper socks, pt to call before getting OOB, stay with me (per policy), and gait belt  High Fall Risk: Yes    Length of Stay:  Expected LOS: 2d 14h  Actual LOS: [address], RN

## 2022-11-05 NOTE — PROGRESS NOTES
Problem: Falls - Risk of  Goal: *Absence of Falls  Description: Document Kailyn Ground Fall Risk and appropriate interventions in the flowsheet.   Outcome: Progressing Towards Goal  Note: Fall Risk Interventions:  Mobility Interventions: Bed/chair exit alarm         Medication Interventions: Bed/chair exit alarm    Elimination Interventions: Bed/chair exit alarm, Call light in reach              Problem: Patient Education: Go to Patient Education Activity  Goal: Patient/Family Education  Outcome: Progressing Towards Goal

## 2022-11-06 VITALS
HEART RATE: 85 BPM | SYSTOLIC BLOOD PRESSURE: 105 MMHG | TEMPERATURE: 97.7 F | OXYGEN SATURATION: 98 % | DIASTOLIC BLOOD PRESSURE: 80 MMHG | RESPIRATION RATE: 20 BRPM

## 2022-11-06 LAB
GLUCOSE BLD STRIP.AUTO-MCNC: 115 MG/DL (ref 65–117)
GLUCOSE BLD STRIP.AUTO-MCNC: 122 MG/DL (ref 65–117)
GLUCOSE BLD STRIP.AUTO-MCNC: 123 MG/DL (ref 65–117)
SERVICE CMNT-IMP: ABNORMAL
SERVICE CMNT-IMP: ABNORMAL
SERVICE CMNT-IMP: NORMAL

## 2022-11-06 PROCEDURE — 74011250637 HC RX REV CODE- 250/637: Performed by: STUDENT IN AN ORGANIZED HEALTH CARE EDUCATION/TRAINING PROGRAM

## 2022-11-06 PROCEDURE — 82962 GLUCOSE BLOOD TEST: CPT

## 2022-11-06 PROCEDURE — 74011250637 HC RX REV CODE- 250/637: Performed by: INTERNAL MEDICINE

## 2022-11-06 PROCEDURE — 74011250636 HC RX REV CODE- 250/636: Performed by: INTERNAL MEDICINE

## 2022-11-06 RX ORDER — SODIUM BICARBONATE 650 MG/1
650 TABLET ORAL DAILY
Qty: 30 TABLET | Refills: 0 | Status: SHIPPED | OUTPATIENT
Start: 2022-11-06 | End: 2022-12-06

## 2022-11-06 RX ADMIN — FERROUS SULFATE TAB 325 MG (65 MG ELEMENTAL FE) 325 MG: 325 (65 FE) TAB at 09:46

## 2022-11-06 RX ADMIN — SODIUM BICARBONATE 650 MG: 650 TABLET ORAL at 09:46

## 2022-11-06 RX ADMIN — ENOXAPARIN SODIUM 30 MG: 100 INJECTION SUBCUTANEOUS at 09:46

## 2022-11-06 RX ADMIN — SODIUM ZIRCONIUM CYCLOSILICATE 10 G: 10 POWDER, FOR SUSPENSION ORAL at 09:46

## 2022-11-06 RX ADMIN — GUAIFENESIN 1200 MG: 600 TABLET, EXTENDED RELEASE ORAL at 09:46

## 2022-11-06 RX ADMIN — MONTELUKAST 10 MG: 10 TABLET, FILM COATED ORAL at 09:46

## 2022-11-06 RX ADMIN — TRIAMCINOLONE ACETONIDE: 1 CREAM TOPICAL at 09:47

## 2022-11-06 RX ADMIN — DILTIAZEM HYDROCHLORIDE 360 MG: 180 CAPSULE, COATED, EXTENDED RELEASE ORAL at 09:46

## 2022-11-06 NOTE — PROGRESS NOTES
0700 Bedside and Verbal shift change report given to [id number] Jayden Benedict and Jenny Mcdaniel RN (oncoming nurse) by Chey Paulino (offgoing nurse). Report included the following information SBAR, Kardex, ED Summary, MAR, Recent Results, and Cardiac Rhythm NSR .     1145 Reviewed discharge instructions with patient, all questions answered. Patient discharged home with family.

## 2022-11-06 NOTE — DISCHARGE SUMMARY
Hospitalist Discharge Summary     Patient ID:  Shaneka Stiles  [id number]  64 y.o.  1960 11/4/2022    PCP on record: Ulises Polk DO    Admit date: 11/4/2022  Discharge date and time: 11/6/2022    DISCHARGE DIAGNOSIS:    Hyperkalemia - resolved; dc ACEI; HCTZ, sodium bicarb on dc  JESSE - resolved  Abdominal pain - chronic, has been evaluated outpatient for hernia; does not require pain medications outpatient  CAD  HTN  DM2  Morbid obesity - BMI 54.91    CONSULTATIONS:  IP CONSULT TO NEPHROLOGY  __________________________________________________________________  DISCHARGE SUMMARY/HOSPITAL COURSE:  for full details see H&P, daily progress notes, labs, consult notes. 58 yo F adm for abdominal pain, noted to have hyperkalemia, JESSE. Nephrology consulted, ACEI stopped. HCTZ and sodium bicarb po on discharge. Labs normalized. Her abdominal pain has been intermittent for years, she has been evaluated outpatient and thought to be related to hernia for which no intervention was recommended. She does not take pain medication regularly outpatient and it is resolved on discharge.    _______________________________________________________________________  Patient seen and examined by me on discharge day. Pertinent Findings:    GEN: awake, alert, non-diaphoretic, no psychomotor agitation, no acute distress  HEENT: Atraumatic, normocephalic, no rashes noted, no facial lesions noted. Eyes: No redness, no discharge, no swelling. No drainage observed from nose. CARDIOPULMONARY: no increased respiratory effort, speaking in clear sentences, I:E ratio WNL  GI: Abdomen does not appear to be distended  MSK: normal ROM in the neck, upper extremities and lower extremities  SKIN: no lesions, wounds, erythema, or cyanosis noted on face or hands  NEURO: EOMs intact. No facial asymmetry. No aphasia or slurred speech. Symmetrical strength, Sensation grossly intact. Alert and oriented X 4.   Pupils are reactive bilaterally. PSYCH: appearance, behavior, and attitude - well groomed, pleasant, cooperative    _______________________________________________________________________  DISCHARGE MEDICATIONS:   Current Discharge Medication List        START taking these medications    Details   sodium bicarbonate 650 mg tablet Take 1 Tablet by mouth daily for 30 days. Qty: 30 Tablet, Refills: 0  Start date: 11/6/2022, End date: 12/6/2022           CONTINUE these medications which have NOT CHANGED    Details   dilTIAZem (TIAZAC) 360 mg ER capsule TAKE 1 CAPSULE BY MOUTH ONCE DAILY (DO NOT OPEN CRUSH OR CHEW SWALLOW WHOLE)      hydroCHLOROthiazide (HYDRODIURIL) 50 mg tablet TAKE 1 TABLET BY MOUTH ONCE DAILY      metFORMIN (GLUCOPHAGE) 1,000 mg tablet TAKE 1 TABLET BY MOUTH TWICE DAILY WITH MEALS      guaiFENesin (MUCINEX) 1,200 mg Ta12 ER tablet Take 1 Tab by mouth two (2) times a day. Qty: 20 Tab, Refills: 0      CHOLECALCIFEROL, VITAMIN D3, (VITAMIN D3 PO) Take 1 Tab by mouth daily. atorvastatin (LIPITOR) 20 mg tablet Take 20 mg by mouth nightly. ascorbic acid, vitamin C, (VITAMIN C) 500 mg tablet Take 500 mg by mouth daily. montelukast (SINGULAIR) 10 mg tablet TAKE ONE TABLET BY MOUTH ONCE DAILY  Qty: 30 Tab, Refills: 5    Comments: Please consider 90 day supplies to promote better adherence  Associated Diagnoses: Bronchitis      ferrous sulfate 325 mg (65 mg iron) tablet Take 1 Tab by mouth Daily (before breakfast). Indications: IRON DEFICIENCY ANEMIA  Qty: 90 Tab, Refills: 1    Associated Diagnoses: Iron deficiency anemia, unspecified iron deficiency anemia type      albuterol (PROVENTIL HFA, VENTOLIN HFA, PROAIR HFA) 90 mcg/actuation inhaler Take 2 Puffs by inhalation every four (4) hours as needed for Wheezing or Shortness of Breath.   Qty: 1 Inhaler, Refills: 5    Associated Diagnoses: Bronchitis; Mild intermittent asthma without complication      triamcinolone acetonide (KENALOG) 0.1 % topical cream Apply to affected area two (2) times a day. use thin layer  Qty: 15 g, Refills: 0      acetaminophen (TYLENOL EXTRA STRENGTH) 500 mg tablet Take 1,000 mg by mouth every six (6) hours as needed for Pain. STOP taking these medications       quinapril (ACCUPRIL) 40 mg tablet Comments:   Reason for Stopping:                 Patient Follow Up Instructions:    Activity: Activity as tolerated  Diet: Resume previous diet      Follow-up Information       Follow up With Specialties Details Why Contact Baledmar Marlow DO Internal Medicine Physician, Pediatric Medicine   [address] [place] [address]  [phone]            ________________________________________________________________    Risk of deterioration: Low    Condition at Discharge:  Stable  __________________________________________________________________    Disposition  Home with family, no needs    ____________________________________________________________________    Code Status: Full Code  ___________________________________________________________________      Total time in minutes spent coordinating this discharge (includes going over instructions, follow-up, prescriptions, and preparing report for sign off to her PCP) :  >30 minutes    Signed:  Fatimah Agarwal DO

## 2022-11-06 NOTE — PROGRESS NOTES
Problem: Falls - Risk of  Goal: *Absence of Falls  Description: Document Serena Corrales Fall Risk and appropriate interventions in the flowsheet.   Outcome: Progressing Towards Goal  Note: Fall Risk Interventions:  Mobility Interventions: Assess mobility with egress test, Bed/chair exit alarm, Communicate number of staff needed for ambulation/transfer, Patient to call before getting OOB         Medication Interventions: Bed/chair exit alarm, Patient to call before getting OOB, Teach patient to arise slowly    Elimination Interventions: Call light in reach, Patient to call for help with toileting needs, Toileting schedule/hourly rounds              Problem: Patient Education: Go to Patient Education Activity  Goal: Patient/Family Education  Outcome: Progressing Towards Goal

## 2022-11-06 NOTE — PROGRESS NOTES
Problem: Falls - Risk of  Goal: *Absence of Falls  Description: Document Duc Pedersen Fall Risk and appropriate interventions in the flowsheet.   11/6/2022 1130 by Georgi Luna RN  Outcome: Resolved/Met  11/6/2022 1045 by Georgi Luna RN  Outcome: Progressing Towards Goal  Note: Fall Risk Interventions:  Mobility Interventions: Assess mobility with egress test, Bed/chair exit alarm, Communicate number of staff needed for ambulation/transfer, Patient to call before getting OOB         Medication Interventions: Bed/chair exit alarm, Patient to call before getting OOB, Teach patient to arise slowly    Elimination Interventions: Call light in reach, Patient to call for help with toileting needs, Toileting schedule/hourly rounds              Problem: Patient Education: Go to Patient Education Activity  Goal: Patient/Family Education  11/6/2022 1130 by Georgi Luna RN  Outcome: Resolved/Met  11/6/2022 1045 by Georgi Luna RN  Outcome: Progressing Towards Goal

## 2022-11-06 NOTE — PROGRESS NOTES
Transition of Care Plan:    RUR: 11%  Disposition: Home with follow-up appts, PT/OT cleared the patient for home with no needs  Follow up appointments: To be scheduled by patient   DME needed: N/A  Transportation at Discharge: The patient's  will be transporting her home   [place] or means to access home: Yes      IM Medicare Letter: Given & signed on 11/6/22  Is patient a  and connected with the [place]? N/A              If yes, was [place] transfer form completed and [facility] notified? Caregiver Contact: Shannon Kaminski, Daughter, Phone: [phone]  Discharge Caregiver contacted prior to discharge? CM will contact her daughter if she requests this   Care Conference needed?: No    CM was alerted that the patient is being discharged today, 11/6/22. The patient has no questions/concerns for discharge. Her  will be at the hospital around 11 AM to transport her home. 2nd IM letter was given and signed. From CM perspective, the patient can be discharged.      Carla Ocampo 275, [address] [facility]

## 2022-11-08 LAB — RENIN PLAS-CCNC: 7.84 NG/ML/HR (ref 0.17–5.38)

## 2022-11-10 LAB — ALDOST SERPL-MCNC: 15.2 NG/DL (ref 0–30)

## 2023-02-03 ENCOUNTER — TRANSCRIBE ORDER (OUTPATIENT)
Dept: SCHEDULING | Age: 63
End: 2023-02-03

## 2023-02-03 DIAGNOSIS — R60.0 BILATERAL LEG EDEMA: ICD-10-CM

## 2023-02-03 DIAGNOSIS — E66.01 MORBID OBESITY WITH BMI OF 50.0-59.9, ADULT (HCC): Primary | ICD-10-CM

## 2023-02-03 DIAGNOSIS — R06.09 DYSPNEA ON EXERTION: ICD-10-CM

## 2023-02-04 ENCOUNTER — HOSPITAL ENCOUNTER (EMERGENCY)
Age: 63
Discharge: HOME | End: 2023-02-04
Attending: EMERGENCY MEDICINE
Payer: COMMERCIAL

## 2023-02-04 ENCOUNTER — APPOINTMENT (OUTPATIENT)
Dept: CT IMAGING | Age: 63
End: 2023-02-04
Attending: EMERGENCY MEDICINE
Payer: COMMERCIAL

## 2023-02-04 VITALS
BODY MASS INDEX: 48.82 KG/M2 | HEIGHT: 65 IN | HEART RATE: 75 BPM | RESPIRATION RATE: 16 BRPM | SYSTOLIC BLOOD PRESSURE: 200 MMHG | DIASTOLIC BLOOD PRESSURE: 88 MMHG | TEMPERATURE: 98.1 F | OXYGEN SATURATION: 93 % | WEIGHT: 293 LBS

## 2023-02-04 DIAGNOSIS — I16.0 HYPERTENSIVE URGENCY: ICD-10-CM

## 2023-02-04 DIAGNOSIS — K43.9 VENTRAL HERNIA WITHOUT OBSTRUCTION OR GANGRENE: ICD-10-CM

## 2023-02-04 DIAGNOSIS — R10.84 ABDOMINAL PAIN, GENERALIZED: Primary | ICD-10-CM

## 2023-02-04 DIAGNOSIS — N28.89 RENAL MASS: ICD-10-CM

## 2023-02-04 LAB
ALBUMIN SERPL-MCNC: 3.6 G/DL (ref 3.5–5)
ALBUMIN/GLOB SERPL: 1 (ref 1.1–2.2)
ALP SERPL-CCNC: 95 U/L (ref 45–117)
ALT SERPL-CCNC: 16 U/L (ref 12–78)
ANION GAP SERPL CALC-SCNC: 10 MMOL/L (ref 5–15)
ANION GAP SERPL CALC-SCNC: 8 MMOL/L (ref 5–15)
APPEARANCE UR: CLEAR
AST SERPL-CCNC: 12 U/L (ref 15–37)
ATRIAL RATE: 82 BPM
BACTERIA URNS QL MICRO: NEGATIVE /HPF
BASOPHILS # BLD: 0 K/UL (ref 0–0.1)
BASOPHILS NFR BLD: 0 % (ref 0–1)
BILIRUB SERPL-MCNC: 0.3 MG/DL (ref 0.2–1)
BILIRUB UR QL: NEGATIVE
BUN SERPL-MCNC: 21 MG/DL (ref 6–20)
BUN SERPL-MCNC: 23 MG/DL (ref 6–20)
BUN/CREAT SERPL: 17 (ref 12–20)
BUN/CREAT SERPL: 17 (ref 12–20)
CALCIUM SERPL-MCNC: 8.8 MG/DL (ref 8.5–10.1)
CALCIUM SERPL-MCNC: 9.2 MG/DL (ref 8.5–10.1)
CALCULATED P AXIS, ECG09: 24 DEGREES
CALCULATED R AXIS, ECG10: 31 DEGREES
CALCULATED T AXIS, ECG11: 27 DEGREES
CHLORIDE SERPL-SCNC: 100 MMOL/L (ref 97–108)
CHLORIDE SERPL-SCNC: 99 MMOL/L (ref 97–108)
CO2 SERPL-SCNC: 29 MMOL/L (ref 21–32)
CO2 SERPL-SCNC: 30 MMOL/L (ref 21–32)
COLOR UR: NORMAL
CREAT SERPL-MCNC: 1.26 MG/DL (ref 0.55–1.02)
CREAT SERPL-MCNC: 1.32 MG/DL (ref 0.55–1.02)
DIAGNOSIS, 93000: NORMAL
DIFFERENTIAL METHOD BLD: ABNORMAL
EOSINOPHIL # BLD: 0.1 K/UL (ref 0–0.4)
EOSINOPHIL NFR BLD: 1 % (ref 0–7)
EPITH CASTS URNS QL MICRO: NORMAL /LPF
ERYTHROCYTE [DISTWIDTH] IN BLOOD BY AUTOMATED COUNT: 17.1 % (ref 11.5–14.5)
GLOBULIN SER CALC-MCNC: 3.7 G/DL (ref 2–4)
GLUCOSE SERPL-MCNC: 102 MG/DL (ref 65–100)
GLUCOSE SERPL-MCNC: 103 MG/DL (ref 65–100)
GLUCOSE UR STRIP.AUTO-MCNC: NEGATIVE MG/DL
HCT VFR BLD AUTO: 32.7 % (ref 35–47)
HGB BLD-MCNC: 9.9 G/DL (ref 11.5–16)
HGB UR QL STRIP: NEGATIVE
IMM GRANULOCYTES # BLD AUTO: 0.1 K/UL (ref 0–0.04)
IMM GRANULOCYTES NFR BLD AUTO: 1 % (ref 0–0.5)
KETONES UR QL STRIP.AUTO: NEGATIVE MG/DL
LACTATE SERPL-SCNC: 3 MMOL/L (ref 0.4–2)
LACTATE SERPL-SCNC: 3.7 MMOL/L (ref 0.4–2)
LACTATE SERPL-SCNC: 3.7 MMOL/L (ref 0.4–2)
LACTATE SERPL-SCNC: 4 MMOL/L (ref 0.4–2)
LEUKOCYTE ESTERASE UR QL STRIP.AUTO: NEGATIVE
LIPASE SERPL-CCNC: 196 U/L (ref 73–393)
LYMPHOCYTES # BLD: 1.9 K/UL (ref 0.8–3.5)
LYMPHOCYTES NFR BLD: 20 % (ref 12–49)
MCH RBC QN AUTO: 24.6 PG (ref 26–34)
MCHC RBC AUTO-ENTMCNC: 30.3 G/DL (ref 30–36.5)
MCV RBC AUTO: 81.1 FL (ref 80–99)
MONOCYTES # BLD: 0.7 K/UL (ref 0–1)
MONOCYTES NFR BLD: 7 % (ref 5–13)
NEUTS SEG # BLD: 6.9 K/UL (ref 1.8–8)
NEUTS SEG NFR BLD: 72 % (ref 32–75)
NITRITE UR QL STRIP.AUTO: NEGATIVE
NRBC # BLD: 0 K/UL (ref 0–0.01)
NRBC BLD-RTO: 0 PER 100 WBC
P-R INTERVAL, ECG05: 178 MS
PH UR STRIP: 6 (ref 5–8)
PLATELET # BLD AUTO: 468 K/UL (ref 150–400)
PMV BLD AUTO: 9.5 FL (ref 8.9–12.9)
POTASSIUM SERPL-SCNC: 5.3 MMOL/L (ref 3.5–5.1)
POTASSIUM SERPL-SCNC: 5.3 MMOL/L (ref 3.5–5.1)
PROT SERPL-MCNC: 7.3 G/DL (ref 6.4–8.2)
PROT UR STRIP-MCNC: NEGATIVE MG/DL
Q-T INTERVAL, ECG07: 380 MS
QRS DURATION, ECG06: 86 MS
QTC CALCULATION (BEZET), ECG08: 443 MS
RBC # BLD AUTO: 4.03 M/UL (ref 3.8–5.2)
RBC #/AREA URNS HPF: NORMAL /HPF (ref 0–5)
SODIUM SERPL-SCNC: 138 MMOL/L (ref 136–145)
SODIUM SERPL-SCNC: 138 MMOL/L (ref 136–145)
SP GR UR REFRACTOMETRY: 1.01 (ref 1–1.03)
TROPONIN I SERPL HS-MCNC: 8 NG/L (ref 0–51)
UA: UC IF INDICATED,UAUC: NORMAL
UROBILINOGEN UR QL STRIP.AUTO: 0.2 EU/DL (ref 0.2–1)
VENTRICULAR RATE, ECG03: 82 BPM
WBC # BLD AUTO: 9.7 K/UL (ref 3.6–11)
WBC URNS QL MICRO: NORMAL /HPF (ref 0–4)

## 2023-02-04 PROCEDURE — 84484 ASSAY OF TROPONIN QUANT: CPT

## 2023-02-04 PROCEDURE — 83605 ASSAY OF LACTIC ACID: CPT

## 2023-02-04 PROCEDURE — 93005 ELECTROCARDIOGRAM TRACING: CPT

## 2023-02-04 PROCEDURE — 80053 COMPREHEN METABOLIC PANEL: CPT

## 2023-02-04 PROCEDURE — 96374 THER/PROPH/DIAG INJ IV PUSH: CPT

## 2023-02-04 PROCEDURE — 96361 HYDRATE IV INFUSION ADD-ON: CPT

## 2023-02-04 PROCEDURE — 81001 URINALYSIS AUTO W/SCOPE: CPT

## 2023-02-04 PROCEDURE — 85025 COMPLETE CBC W/AUTO DIFF WBC: CPT

## 2023-02-04 PROCEDURE — 74011250636 HC RX REV CODE- 250/636: Performed by: EMERGENCY MEDICINE

## 2023-02-04 PROCEDURE — 99284 EMERGENCY DEPT VISIT MOD MDM: CPT

## 2023-02-04 PROCEDURE — 83690 ASSAY OF LIPASE: CPT

## 2023-02-04 PROCEDURE — 74176 CT ABD & PELVIS W/O CONTRAST: CPT

## 2023-02-04 PROCEDURE — 36415 COLL VENOUS BLD VENIPUNCTURE: CPT

## 2023-02-04 RX ORDER — LABETALOL HCL 20 MG/4 ML
20 SYRINGE (ML) INTRAVENOUS
Status: COMPLETED | OUTPATIENT
Start: 2023-02-04 | End: 2023-02-04

## 2023-02-04 RX ORDER — POLYETHYLENE GLYCOL 3350 17 G/17G
17 POWDER, FOR SOLUTION ORAL DAILY
Qty: 289 G | Refills: 0 | Status: SHIPPED | OUTPATIENT
Start: 2023-02-04 | End: 2023-02-14

## 2023-02-04 RX ADMIN — LABETALOL HYDROCHLORIDE 20 MG: 5 INJECTION, SOLUTION INTRAVENOUS at 14:43

## 2023-02-04 RX ADMIN — SODIUM CHLORIDE 1000 ML: 9 INJECTION, SOLUTION INTRAVENOUS at 13:18

## 2023-02-04 RX ADMIN — SODIUM CHLORIDE 1000 ML: 9 INJECTION, SOLUTION INTRAVENOUS at 14:44

## 2023-02-04 RX ADMIN — SODIUM CHLORIDE 1000 ML: 9 INJECTION, SOLUTION INTRAVENOUS at 19:21

## 2023-02-04 NOTE — ED PROVIDER NOTES
EMERGENCY DEPARTMENT HISTORY AND PHYSICAL EXAM      Date: 2/4/2023  Patient Name: Heaven Henley    History of Presenting Illness     Chief Complaint   Patient presents with    Abdominal Pain       History Provided By: Patient    HPI: Heaven Henley, 60 y.o. female with past medical history listed below, presents via private vehicle to the ED with cc of abdominal pain. Patient reports she has a known ventral hernia. She reports that in periumbilical abdominal pain for the past 3 days. She has not had no BM for the past 3 days as well. She reports decreased p.o. intake. She has pain in her periumbilical and left lower quadrant area. No fevers or chills. No nausea or vomiting. Pain has been a constant ache. Pain is worse with movement. There are no other complaints, changes, or physical findings at this time. PCP: Britni Adam,     No current facility-administered medications on file prior to encounter. Current Outpatient Medications on File Prior to Encounter   Medication Sig Dispense Refill    triamcinolone acetonide (KENALOG) 0.1 % topical cream Apply  to affected area two (2) times a day. use thin layer 15 g 0    dilTIAZem (TIAZAC) 360 mg ER capsule TAKE 1 CAPSULE BY MOUTH ONCE DAILY (DO NOT OPEN CRUSH OR CHEW SWALLOW WHOLE)      hydroCHLOROthiazide (HYDRODIURIL) 50 mg tablet TAKE 1 TABLET BY MOUTH ONCE DAILY      metFORMIN (GLUCOPHAGE) 1,000 mg tablet TAKE 1 TABLET BY MOUTH TWICE DAILY WITH MEALS      guaiFENesin (MUCINEX) 1,200 mg Ta12 ER tablet Take 1 Tab by mouth two (2) times a day. 20 Tab 0    CHOLECALCIFEROL, VITAMIN D3, (VITAMIN D3 PO) Take 1 Tab by mouth daily. acetaminophen (TYLENOL EXTRA STRENGTH) 500 mg tablet Take 1,000 mg by mouth every six (6) hours as needed for Pain. atorvastatin (LIPITOR) 20 mg tablet Take 20 mg by mouth nightly. ascorbic acid, vitamin C, (VITAMIN C) 500 mg tablet Take 500 mg by mouth daily.       montelukast (SINGULAIR) 10 mg tablet TAKE ONE TABLET BY MOUTH ONCE DAILY 30 Tab 5    ferrous sulfate 325 mg (65 mg iron) tablet Take 1 Tab by mouth Daily (before breakfast). Indications: IRON DEFICIENCY ANEMIA 90 Tab 1    albuterol (PROVENTIL HFA, VENTOLIN HFA, PROAIR HFA) 90 mcg/actuation inhaler Take 2 Puffs by inhalation every four (4) hours as needed for Wheezing or Shortness of Breath. 1 Inhaler 5       Past History     Past Medical History:  Past Medical History:   Diagnosis Date    Anemia     Diabetes mellitus type II     Hypertension     Menopause        Past Surgical History:  Past Surgical History:   Procedure Laterality Date    HX GI  2006     bowel surgery for infection s/p colostomy and then removal     HX PELVIC LAPAROSCOPY         Family History:  Family History   Problem Relation Age of Onset    Diabetes Mother     Breast Cancer Mother         age dx 63's    Hypertension Mother     Hypertension Sister     Breast Cancer Sister         age dx 52's    Diabetes Sister     Heart Disease Father     Hypertension Father     Stroke Maternal Grandfather     Stroke Paternal Grandfather     Breast Cancer Paternal Aunt     Breast Cancer Paternal Aunt        Social History:  Social History     Tobacco Use    Smoking status: Former    Smokeless tobacco: Never   Substance Use Topics    Alcohol use: No    Drug use: No       Allergies:  No Known Allergies      Review of Systems   Review of Systems   Constitutional:  Negative for activity change, chills and fever. HENT:  Negative for congestion and sore throat. Eyes:  Negative for pain and redness. Respiratory:  Negative for cough, chest tightness and shortness of breath. Cardiovascular:  Negative for chest pain and palpitations. Gastrointestinal:  Positive for abdominal pain and constipation. Negative for diarrhea, nausea and vomiting. Genitourinary:  Negative for dysuria, frequency and urgency. Musculoskeletal:  Negative for back pain and neck pain. Skin:  Negative for rash. Neurological:  Negative for syncope, light-headedness and headaches. Psychiatric/Behavioral:  Negative for confusion. All other systems reviewed and are negative. Physical Exam     Physical Exam  Vitals and nursing note reviewed. Constitutional:       General: She is not in acute distress. Appearance: She is well-developed. She is not diaphoretic. HENT:      Head: Normocephalic. Nose: Nose normal.      Mouth/Throat:      Pharynx: No oropharyngeal exudate. Eyes:      General: No scleral icterus. Conjunctiva/sclera: Conjunctivae normal.      Pupils: Pupils are equal, round, and reactive to light. Neck:      Thyroid: No thyromegaly. Vascular: No JVD. Trachea: No tracheal deviation. Cardiovascular:      Rate and Rhythm: Normal rate and regular rhythm. Heart sounds: No murmur heard. No friction rub. No gallop. Pulmonary:      Effort: Pulmonary effort is normal. No respiratory distress. Breath sounds: Normal breath sounds. No stridor. No wheezing or rales. Abdominal:      General: Bowel sounds are normal. There is no distension. Palpations: Abdomen is soft. Tenderness: There is no abdominal tenderness. There is no guarding or rebound. Comments: Large easily reducible ventral hernia. Minimal diffuse abdominal tenderness. Musculoskeletal:         General: Normal range of motion. Cervical back: Normal range of motion and neck supple. Lymphadenopathy:      Cervical: No cervical adenopathy. Skin:     General: Skin is warm and dry. Findings: No erythema or rash. Neurological:      Mental Status: She is alert and oriented to person, place, and time. Cranial Nerves: No cranial nerve deficit. Motor: No abnormal muscle tone.       Coordination: Coordination normal.   Psychiatric:         Behavior: Behavior normal.             Diagnostic Study Results     Labs -     Recent Results (from the past 12 hour(s))   LACTIC ACID Collection Time: 02/04/23  9:37 PM   Result Value Ref Range    Lactic acid 3.0 (HH) 0.4 - 2.0 MMOL/L       Radiologic Studies -   CT ABD PELV WO CONT   Final Result   Left upper pole renal mass highly concerning for renal cell neoplasm. Renal mass protocol CT for further delineation recommended. Cardiomegaly and moderate pericardial effusion. Otherwise no significant interval change. Incidental and/or nonemergent findings are as described in detail above. CT Results  (Last 48 hours)                 02/04/23 1456  CT ABD PELV WO CONT Final result    Impression:  Left upper pole renal mass highly concerning for renal cell neoplasm. Renal mass protocol CT for further delineation recommended. Cardiomegaly and moderate pericardial effusion. Otherwise no significant interval change. Incidental and/or nonemergent findings are as described in detail above. Narrative:  CLINICAL HISTORY: lower abd pain    INDICATION: lower abd pain   COMPARISON: 11/3/2022   CONTRAST:  None. TECHNIQUE:    Thin axial images were obtained through the abdomen and pelvis. Coronal and   sagittal reformats were generated. Oral contrast was not administered. CT dose   reduction was achieved through use of a standardized protocol tailored for this   examination and automatic exposure control for dose modulation. The absence of intravenous contrast material reduces the sensitivity for   evaluation of visceral organs and vasculature including presence of small mass   lesions, hemodynamically significant stenoses, dissections, mucosal   abnormalities etc.       FINDINGS:    LOWER THORAX: Cardiomegaly and moderate pericardial effusion. LIVER/GALLBLADDER: Hepatic steatosis . Medial caudate hypodensity is not   changed. CBD is not dilated. SPLEEN/PANCREAS:  within normal limits. ADRENALS/KIDNEYS: Unremarkable. No calculus or hydronephrosis. STOMACH: Unremarkable.    SMALL BOWEL/COLON: No dilatation or wall thickening. Partial colectomy. APPENDIX: Unremarkable. PERITONEUM: Residual changes related to prior ostomy in the left lower quadrant. RETROPERITONEUM: No lymphadenopathy or aortic aneurysm. REPRODUCTIVE ORGANS:   URINARY BLADDER: No mass or calculus. BONES: No destructive bone lesion. ADDITIONAL COMMENTS: N/A                 CXR Results  (Last 48 hours)      None              Medical Decision Making   I am the first provider for this patient. I reviewed the vital signs, available nursing notes, past medical history, past surgical history, family history and social history. Vital Signs-Reviewed the patient's vital signs. Patient Vitals for the past 12 hrs:   BP   02/04/23 2100 (!) 200/88   02/04/23 2048 (!) 149/112   02/04/23 2033 (!) 122/98   02/04/23 2018 (!) 208/189   02/04/23 2000 (!) 182/92   02/04/23 1945 (!) 167/95   02/04/23 1933 (!) 178/84   02/04/23 1924 (!) 153/97           Records Reviewed: Nursing notes reviewed    Provider Notes (Medical Decision Making):   DDX: 50-year-old female with known ventral hernia with lower abdominal pain for 3 days and no bowel movement for 3 days. No nausea or vomiting. No fevers or chills. Obtain basic lab work and most likely obtain CT scan to rule out obstruction or incarceration. Ventral hernia is easily reducible on exam.    ED Course:   Initial assessment performed. The patients presenting problems have been discussed, and they are in agreement with the care plan formulated and outlined with them. I have encouraged them to ask questions as they arise throughout their visit. ED Course as of 02/05/23 0723   Sat Feb 04, 2023   4150 Patient signed out by Dr. Alexandre Lawler to follow-up on lactic acid, if decreasing patient may be discharged. Discharge instructions written by Dr. Alexandre Lawler. [MF]   1930 Updated patient on repeat lactic plan for additional IV fluids and recheck of lactic acid.  [MF]   2222 Repeat lactic acid improved to 3 after IV fluids, she is instructed to contact her PCP regarding her metformin use and to follow-up as scheduled on CT findings. She is instructed to increase her fluid intake over the next several days. [MF]      ED Course User Index  [MF] Herminia Gold MD         PROGRESS NOTE    Pt reevaluated. CT without any acute findings. Note there was a left upper renal mass concerning for renal cell carcinoma. Renal mass seen previously on prior CT scan in November 2022. Reviewed CT results with patient. Patient states that she did not follow-up with anyone concerning the renal mass at that time. Gave oncology follow-up and recommended she speak with her PCP. She will most likely need a IR guided biopsy of the mass. And further work-up. Patient markedly hypertensive on arrival.  Gave 20 mg IV labetalol with improvement. CBC unremarkable. No increased WBC. UA negative. Lactate elevated at 4.0 most likely due to dehydration. Negative troponin. Baseline creatinine. Written by Jay Fulton MD     Progress note:    Pt noted to be feeling better and ready for discharge. Updated pt and/or family on all final lab and/or  imaging findings. Will follow up as instructed. All questions have been answered, pt voiced understanding and agreement with plan. Specific return precautions provided as well as instructions to return to the ED should sx worsen at any time. Vital signs stable for discharge. I have also put together some discharge instructions for them that include: 1) educational information regarding their diagnosis, 2) how to care for their diagnosis at home, as well a 3) list of reasons why they would want to return to the ED prior to their follow-up appointment, should their condition change. Written by Jay Fulton MD        Critical Care Time:   0    Disposition:  Discharge    PLAN:  1.    Discharge Medication List as of 2/4/2023  3:57 PM        START taking these medications    Details polyethylene glycol (Miralax) 17 gram/dose powder Take 17 g by mouth daily for 10 days. 1 tablespoon with 8 oz of water daily, Normal, Disp-289 g, R-0           CONTINUE these medications which have NOT CHANGED    Details   triamcinolone acetonide (KENALOG) 0.1 % topical cream Apply  to affected area two (2) times a day. use thin layer, Normal, Disp-15 g, R-0      dilTIAZem (TIAZAC) 360 mg ER capsule TAKE 1 CAPSULE BY MOUTH ONCE DAILY (DO NOT OPEN CRUSH OR CHEW SWALLOW WHOLE), Historical Med      hydroCHLOROthiazide (HYDRODIURIL) 50 mg tablet TAKE 1 TABLET BY MOUTH ONCE DAILY, Historical Med      metFORMIN (GLUCOPHAGE) 1,000 mg tablet TAKE 1 TABLET BY MOUTH TWICE DAILY WITH MEALS, Historical Med      guaiFENesin (MUCINEX) 1,200 mg Ta12 ER tablet Take 1 Tab by mouth two (2) times a day., Normal, Disp-20 Tab, R-0      CHOLECALCIFEROL, VITAMIN D3, (VITAMIN D3 PO) Take 1 Tab by mouth daily. , Historical Med      acetaminophen (TYLENOL EXTRA STRENGTH) 500 mg tablet Take 1,000 mg by mouth every six (6) hours as needed for Pain., Historical Med      atorvastatin (LIPITOR) 20 mg tablet Take 20 mg by mouth nightly., Historical Med      ascorbic acid, vitamin C, (VITAMIN C) 500 mg tablet Take 500 mg by mouth daily. , Historical Med      montelukast (SINGULAIR) 10 mg tablet TAKE ONE TABLET BY MOUTH ONCE DAILY, NormalPlease consider 90 day supplies to promote better adherenceDisp-30 Tab, R-5      ferrous sulfate 325 mg (65 mg iron) tablet Take 1 Tab by mouth Daily (before breakfast). Indications: IRON DEFICIENCY ANEMIA, Normal, Disp-90 Tab, R-1      albuterol (PROVENTIL HFA, VENTOLIN HFA, PROAIR HFA) 90 mcg/actuation inhaler Take 2 Puffs by inhalation every four (4) hours as needed for Wheezing or Shortness of Breath., Normal, Disp-1 Inhaler, R-5           2.    Follow-up Information       Follow up With Specialties Details Why Contact Info    Maurizio Wiggins DO Internal Medicine Physician, Pediatric Medicine Schedule an appointment as soon as possible for a visit in 1 week Make sure you talk to Dr. Kathleen Claude about the mass on your kidney found on the CT scan today and on prior CT scans. You will most likely need this biospied. I also gave you the name of an oncologist at [facility]. [address] [facility] [address]      [address] [facility] Emergency Medicine Go in 1 day If symptoms worsen [address]    Panda Maldonado MD Hematology and Oncology, Hematology Physician, Oncology, Internal Medicine Physician   [address]  [address] [address]  [phone]            Return to ED if worse     Diagnosis     Clinical Impression:   1. Abdominal pain, generalized    2. Renal mass    3. Hypertensive urgency    4. Ventral hernia without obstruction or gangrene              Please note that this dictation was completed with [employer], the computer voice recognition software. Quite often unanticipated grammatical, syntax, homophones, and other interpretive errors are inadvertently transcribed by the computer software. Please disregard these errors. Please excuse any errors that have escaped final proofreading.

## 2023-02-04 NOTE — ED TRIAGE NOTES
Pt arrives with c/o left abd pain. She reports that she has a hernia but does not follow a PCP for it. She states that this pain has been ongoing for 3 days and she has not had a BM since it started.

## 2023-02-10 LAB
ATRIAL RATE: 82 BPM
CALCULATED P AXIS, ECG09: 24 DEGREES
CALCULATED R AXIS, ECG10: 31 DEGREES
CALCULATED T AXIS, ECG11: 27 DEGREES
DIAGNOSIS, 93000: NORMAL
P-R INTERVAL, ECG05: 178 MS
Q-T INTERVAL, ECG07: 380 MS
QRS DURATION, ECG06: 86 MS
QTC CALCULATION (BEZET), ECG08: 443 MS
VENTRICULAR RATE, ECG03: 82 BPM

## 2023-02-13 NOTE — PROGRESS NOTES
[place] [address], [address] [place] [address]  [phone]    [address] [facility], [facility] [address]      Subjective:      Enedina Brice is a 60 y.o. female is here for new patient consultation. Pmhx as listed below. Prev cigarette smoker. + family hx CAD      Referred by PCP for LE edema, non-pitting, and shortness of breath, worsening despite intervention with diuresis    Presented to ED 2/4/2023 for abdominal pain, constipation. Reported hx of ventral hernia. CT without any acute findings. Noted left upper renal mass concerning for renal cell carcinoma;  previously on prior CT scan in November 2022. Pt did not follow up with anyone. ED physician gave oncology follow up and advised her to follow up with PCP. Most likely will need IR guided biopsy of the mass. She was markedly hypertensive upon arrival which was treated. CBC ok. Neg trop. Today,     Endorses above--bilateral leg swelling,  very mild improvement with diuretic therapy  + alvarez--not sure if due to heart problem or sedentary lifestyle. She states that she has a mass in her kidney and needs to see oncologist    The patient denies chest pain, orthopnea, PND,  palpitations, syncope, or presyncope.     Patient Active Problem List    Diagnosis Date Noted    Hyperkalemia 11/04/2022    JESSE (acute kidney injury) ([address] 11/04/2022    S/P repair of ventral hernia 03/26/2018    Renal failure syndrome 03/24/2018    Acute renal failure ([address] 03/24/2018    Type 2 diabetes mellitus without complication, without long-term current use of insulin ([address] 11/07/2017    Small bowel obstruction ([address] 10/27/2017    Mild intermittent asthma without complication 73/10/3032    H/O colostomy 08/05/2016    Morbid obesity with BMI of 50.0-59.9, adult ([address] 01/06/2016    Anemia     Hypertension       Wilbert Wiggins DO  Past Medical History:   Diagnosis Date    Anemia     Diabetes mellitus type II     Hypertension     Menopause       Past Surgical History:   Procedure Laterality Date    HX GI  2006     bowel surgery for infection s/p colostomy and then removal     HX PELVIC LAPAROSCOPY       No Known Allergies   Family History   Problem Relation Age of Onset    Diabetes Mother     Breast Cancer Mother         age dx 63's    Hypertension Mother     Hypertension Sister     Breast Cancer Sister         age dx 52's    Diabetes Sister     Heart Disease Father     Hypertension Father     Stroke Maternal Grandfather     Stroke Paternal Grandfather     Breast Cancer Paternal Aunt     Breast Cancer Paternal Aunt       Social History     Socioeconomic History    Marital status:      Spouse name: Not on file    Number of children: Not on file    Years of education: Not on file    Highest education level: Not on file   Occupational History    Not on file   Tobacco Use    Smoking status: Former    Smokeless tobacco: Never   Substance and Sexual Activity    Alcohol use: No    Drug use: No    Sexual activity: Not on file   Other Topics Concern    Not on file   Social History Narrative    Not on file     Social Determinants of Health     Financial Resource Strain: Not on file   Food Insecurity: Not on file   Transportation Needs: Not on file   Physical Activity: Not on file   Stress: Not on file   Social Connections: Not on file   Intimate Partner Violence: Not on file   Housing Stability: Not on file      Current Outpatient Medications   Medication Sig    triamcinolone acetonide (KENALOG) 0.1 % topical cream Apply  to affected area two (2) times a day. use thin layer    dilTIAZem (TIAZAC) 360 mg ER capsule TAKE 1 CAPSULE BY MOUTH ONCE DAILY (DO NOT OPEN CRUSH OR CHEW SWALLOW WHOLE)    hydroCHLOROthiazide (HYDRODIURIL) 50 mg tablet TAKE 1 TABLET BY MOUTH ONCE DAILY    metFORMIN (GLUCOPHAGE) 1,000 mg tablet TAKE 1 TABLET BY MOUTH TWICE DAILY WITH MEALS    guaiFENesin (MUCINEX) 1,200 mg Ta12 ER tablet Take 1 Tab by mouth two (2) times a day.     CHOLECALCIFEROL, VITAMIN D3, (VITAMIN D3 PO) Take 1 Tab by mouth daily. acetaminophen (TYLENOL) 500 mg tablet Take 1,000 mg by mouth every six (6) hours as needed for Pain. atorvastatin (LIPITOR) 20 mg tablet Take 20 mg by mouth nightly. ascorbic acid, vitamin C, (VITAMIN C) 500 mg tablet Take 500 mg by mouth daily. montelukast (SINGULAIR) 10 mg tablet TAKE ONE TABLET BY MOUTH ONCE DAILY    ferrous sulfate 325 mg (65 mg iron) tablet Take 1 Tab by mouth Daily (before breakfast). Indications: IRON DEFICIENCY ANEMIA    albuterol (PROVENTIL HFA, VENTOLIN HFA, PROAIR HFA) 90 mcg/actuation inhaler Take 2 Puffs by inhalation every four (4) hours as needed for Wheezing or Shortness of Breath. No current facility-administered medications for this visit. Review of Symptoms:  11 systems reviewed, negative other than as stated in the HPI    Physical ExamPhysical Exam:    Vitals:    02/16/23 0835   BP: 110/80   Pulse: (!) 101   Resp: 18   Temp: 98.4 °F (36.9 °C)   TempSrc: Temporal   SpO2: 96%   Weight: 318 lb (144.2 kg)   Height: 5' 5\" (1.651 m)     Body mass index is 52.92 kg/m². General PE  Gen:  NAD  Mental Status - Alert. General Appearance - Not in acute distress. HEENT:  PERRL, no carotid bruits or JVD  Chest and Lung Exam   Inspection: Accessory muscles - No use of accessory muscles in breathing. Auscultation:   Breath sounds: - Normal.   Cardiovascular   Inspection: Jugular vein - Bilateral - Inspection Normal.   Palpation/Percussion:   Apical Impulse: - Normal.   Auscultation: Rhythm - Regular. Heart Sounds - S1 WNL and S2 WNL. No S3 or S4. Murmurs & Other Heart Sounds: Auscultation of the heart reveals - No Murmurs. Peripheral Vascular   Upper Extremity: Inspection - Bilateral - No Cyanotic nailbeds or Digital clubbing. Lower Extremity:   Palpation: Edema - Bilateral - No edema. Abdomen:   Soft, non-tender, bowel sounds are active.   Neuro: A&O times 3, CN and motor grossly WNL    Labs:   Lab Results Component Value Date/Time    Cholesterol, total 137 11/07/2017 10:49 AM    Cholesterol, total 153 08/05/2016 10:39 AM    Cholesterol, total 174 09/23/2013 09:08 AM    HDL Cholesterol 35 (L) 11/07/2017 10:49 AM    HDL Cholesterol 43 08/05/2016 10:39 AM    HDL Cholesterol 39 (L) 09/23/2013 09:08 AM    LDL, calculated 78 11/07/2017 10:49 AM    LDL, calculated 97 08/05/2016 10:39 AM    LDL, calculated 118 (H) 09/23/2013 09:08 AM    Triglyceride 122 11/07/2017 10:49 AM    Triglyceride 65 08/05/2016 10:39 AM    Triglyceride 86 09/23/2013 09:08 AM     Lab Results   Component Value Date/Time    CK 2,093 (H) 03/23/2018 10:15 PM     Lab Results   Component Value Date/Time    Sodium 138 02/04/2023 04:23 PM    Potassium 5.3 (H) 02/04/2023 04:23 PM    Chloride 100 02/04/2023 04:23 PM    CO2 30 02/04/2023 04:23 PM    Anion gap 8 02/04/2023 04:23 PM    Glucose 103 (H) 02/04/2023 04:23 PM    BUN 21 (H) 02/04/2023 04:23 PM    Creatinine 1.26 (H) 02/04/2023 04:23 PM    BUN/Creatinine ratio 17 02/04/2023 04:23 PM    GFR est AA >60 04/14/2018 03:13 PM    GFR est non-AA >60 04/14/2018 03:13 PM    Calcium 8.8 02/04/2023 04:23 PM    Bilirubin, total 0.3 02/04/2023 01:38 PM    Alk. phosphatase 95 02/04/2023 01:38 PM    Protein, total 7.3 02/04/2023 01:38 PM    Albumin 3.6 02/04/2023 01:38 PM    Globulin 3.7 02/04/2023 01:38 PM    A-G Ratio 1.0 (L) 02/04/2023 01:38 PM    ALT (SGPT) 16 02/04/2023 01:38 PM       EKG:  SR       Assessment:          ICD-10-CM ICD-9-CM    1. GALLARDO (dyspnea on exertion)  R06.09 786.09 AMB POC EKG ROUTINE W/ 12 LEADS, INTER & REP      ECHO ADULT COMPLETE      NUCLEAR CARDIAC STRESS TEST      REFERRAL TO SLEEP STUDIES      SLEEP MEDICINE REFERRAL      2. Leg swelling  M79.89 729.81 AMB POC EKG ROUTINE W/ 12 LEADS, INTER & REP      ECHO ADULT COMPLETE      NUCLEAR CARDIAC STRESS TEST      REFERRAL TO SLEEP STUDIES      SLEEP MEDICINE REFERRAL      3.  Benign essential HTN  I10 401.1 AMB POC EKG ROUTINE W/ 12 LEADS, INTER & REP      ECHO ADULT COMPLETE      NUCLEAR CARDIAC STRESS TEST      REFERRAL TO SLEEP STUDIES      SLEEP MEDICINE REFERRAL      4. Type 2 diabetes mellitus without complication, without long-term current use of insulin ([facility])  E11.9 250.00 AMB POC EKG ROUTINE W/ 12 LEADS, INTER & REP      ECHO ADULT COMPLETE      NUCLEAR CARDIAC STRESS TEST      REFERRAL TO SLEEP STUDIES      SLEEP MEDICINE REFERRAL      5. Morbid obesity with BMI of 50.0-59.9, adult ([facility])  E66.01 278.01 AMB POC EKG ROUTINE W/ 12 LEADS, INTER & REP    Z68.43 V85.43 ECHO ADULT COMPLETE      NUCLEAR CARDIAC STRESS TEST      REFERRAL TO SLEEP STUDIES      SLEEP MEDICINE REFERRAL      6. History of tobacco abuse  Z87.891 V15.82 AMB POC EKG ROUTINE W/ 12 LEADS, INTER & REP      ECHO ADULT COMPLETE      NUCLEAR CARDIAC STRESS TEST      REFERRAL TO SLEEP STUDIES      SLEEP MEDICINE REFERRAL      7. Stage 3 chronic kidney disease, unspecified whether stage 3a or 3b CKD ([facility])  N18.30 585.3 AMB POC EKG ROUTINE W/ 12 LEADS, INTER & REP      ECHO ADULT COMPLETE      NUCLEAR CARDIAC STRESS TEST      REFERRAL TO SLEEP STUDIES      SLEEP MEDICINE REFERRAL      8. Family history of ischemic heart disease  Z82.49 V17.3 AMB POC EKG ROUTINE W/ 12 LEADS, INTER & REP      ECHO ADULT COMPLETE      NUCLEAR CARDIAC STRESS TEST      REFERRAL TO SLEEP STUDIES      SLEEP MEDICINE REFERRAL      9. Snoring  R06.83 786.09 REFERRAL TO SLEEP STUDIES      SLEEP MEDICINE REFERRAL      10.  ROQUE (obstructive sleep apnea)  G47.33 327.23 REFERRAL TO SLEEP STUDIES      SLEEP MEDICINE REFERRAL          Orders Placed This Encounter    REFERRAL TO SLEEP STUDIES     Referral Priority:   Routine     Referral Type:   Consultation     Referral Reason:   Specialty Services Required     Number of Visits Requested:   1    SLEEP MEDICINE REFERRAL     Standing Status:   Future     Standing Expiration Date:   2/16/2024     Referral Priority:   Routine     Referral Type:   Consultation Referral Reason:   Specialty Services Required     Referred to Provider:   Lea Ivy MD     Requested Specialty:   Sleep Medicine Physician     Number of Visits Requested:   1    AMB POC EKG ROUTINE W/ 12 LEADS, INTER & REP     Order Specific Question:   Reason for Exam:     Answer:   vhd          Plan:     GALLARDO, Bilateral leg swelling  Obtain echo, Karlee  Coronary calcium scoring would be reassuring if totally normal and threshold for further testing/treatment would be decreased if high- will hold off until testing complete  Await JAYDE and PFT  Consider venous reflux study--appears to have CVI    Snoring, ? ROQUE  Refer to Dr Vinod Garcia    HTN  Controlled with current therapy  Recall  hyperkalemia with aldactone    HLD  10/2022 LDL 58 On statin Labs and lipid per PCP    DM  On oral agent    CKD III  Serum Cr 1.26 in 2/2023    Hyperkalemia  K at 5.3 in 2/2023    Recent CT 2/2023 concerning for left upper renal carcinoma  Has been referred to see Dr Naida Raines    BMI 54  Discussed importance of diet and exercise - eventual goal of 30 - 60 minutes, 5-7 times a week as per AHA guideline. Goal of 10 % (30 lbs) weight loss for 6 months. Patient was made aware during visit today that all testing completed would be instantaneously available on their MyChart for review. Discussed that these results will be made available to the provider at the same time. They were advised to wait at least 3 business days to allow for provider's interpretation of results with follow-up before calling our office with concerns about their results.     Continue current care and f/u in april        Griselda Austin NP

## 2023-02-16 ENCOUNTER — OFFICE VISIT (OUTPATIENT)
Dept: CARDIOLOGY CLINIC | Age: 63
End: 2023-02-16
Payer: COMMERCIAL

## 2023-02-16 VITALS
BODY MASS INDEX: 48.82 KG/M2 | WEIGHT: 293 LBS | HEART RATE: 101 BPM | SYSTOLIC BLOOD PRESSURE: 110 MMHG | TEMPERATURE: 98.4 F | DIASTOLIC BLOOD PRESSURE: 80 MMHG | RESPIRATION RATE: 18 BRPM | OXYGEN SATURATION: 96 % | HEIGHT: 65 IN

## 2023-02-16 DIAGNOSIS — E66.01 MORBID OBESITY WITH BMI OF 50.0-59.9, ADULT (HCC): ICD-10-CM

## 2023-02-16 DIAGNOSIS — R06.09 DOE (DYSPNEA ON EXERTION): Primary | ICD-10-CM

## 2023-02-16 DIAGNOSIS — I10 BENIGN ESSENTIAL HTN: ICD-10-CM

## 2023-02-16 DIAGNOSIS — G47.33 OSA (OBSTRUCTIVE SLEEP APNEA): ICD-10-CM

## 2023-02-16 DIAGNOSIS — R06.83 SNORING: ICD-10-CM

## 2023-02-16 DIAGNOSIS — E11.9 TYPE 2 DIABETES MELLITUS WITHOUT COMPLICATION, WITHOUT LONG-TERM CURRENT USE OF INSULIN (HCC): ICD-10-CM

## 2023-02-16 DIAGNOSIS — Z82.49 FAMILY HISTORY OF ISCHEMIC HEART DISEASE: ICD-10-CM

## 2023-02-16 DIAGNOSIS — M79.89 LEG SWELLING: ICD-10-CM

## 2023-02-16 DIAGNOSIS — N18.30 STAGE 3 CHRONIC KIDNEY DISEASE, UNSPECIFIED WHETHER STAGE 3A OR 3B CKD (HCC): ICD-10-CM

## 2023-02-16 DIAGNOSIS — Z87.891 HISTORY OF TOBACCO ABUSE: ICD-10-CM

## 2023-02-16 NOTE — PROGRESS NOTES
Identified pt with two pt identifiers(name and ). Reviewed record in preparation for visit and have obtained necessary documentation. Chief Complaint   Patient presents with    New Patient     Referred by pcp Dr. Maris Robles. Valvular Heart Disease    Leg Swelling    Hypertension      Vitals:    23 0835   BP: 110/80   Pulse: (!) 101   Resp: 18   Temp: 98.4 °F (36.9 °C)   TempSrc: Temporal   SpO2: 96%   Weight: 318 lb (144.2 kg)   Height: 5' 5\" (1.651 m)   PainSc:   0 - No pain       Medications reviewed/approved by provider. Health Maintenance Review: Patient reminded of \"due or due soon\" health maintenance. I have asked the patient to contact his/her primary care provider (PCP) for follow-up on his/her health maintenance. Coordination of Care Questionnaire:  :   1) Have you been to an emergency room, urgent care, or hospitalized since your last visit? If yes, where when, and reason for visit? no       2. Have seen or consulted any other health care provider since your last visit? If yes, where when, and reason for visit? NO      Patient is accompanied by self and daughter I have received verbal consent from Melinda Jones to discuss any/all medical information while they are present in the room.

## 2023-05-04 NOTE — PROGRESS NOTES
----- Message from Nadja Cifuentes [facility] fax# [phone]     [facility] phone# [phone]     Endurance Catheter insertion note     Inserted 20 G, 8 cm long  Endurance Extended Dwell Peripheral Catheter into right upper arm using ultrasound guidance and sterile technique. Positive blood return. Patient tolerated procedure well. Denies questions or concerns at this time. The Endurance catheter is an extended dwell peripheral catheter and may remain in place 29 days. Blood samples can be obtained from this catheter. To obtain labs, a tourniquet may be used, flush with 10ml NS, waste 3ml, draw labs, flush with 20ml NS. Dressings needs to be changed with central line dressing kit using bio patch and stat lock every 7 days by nurse. Primary Nurse notified.           Lesia Razo RN BSN, Vascular Access Team. PICC Nurse

## 2023-06-17 ENCOUNTER — HOSPITAL ENCOUNTER (EMERGENCY)
Facility: HOSPITAL | Age: 63
Discharge: HOME | End: 2023-06-17
Attending: EMERGENCY MEDICINE
Payer: COMMERCIAL

## 2023-06-17 ENCOUNTER — APPOINTMENT (OUTPATIENT)
Facility: HOSPITAL | Age: 63
End: 2023-06-17
Payer: COMMERCIAL

## 2023-06-17 VITALS
HEIGHT: 65 IN | RESPIRATION RATE: 16 BRPM | HEART RATE: 95 BPM | DIASTOLIC BLOOD PRESSURE: 89 MMHG | SYSTOLIC BLOOD PRESSURE: 156 MMHG | BODY MASS INDEX: 48.82 KG/M2 | TEMPERATURE: 98.2 F | WEIGHT: 293 LBS | OXYGEN SATURATION: 95 %

## 2023-06-17 DIAGNOSIS — R10.9 LEFT FLANK PAIN: Primary | ICD-10-CM

## 2023-06-17 DIAGNOSIS — I31.39 PERICARDIAL EFFUSION: ICD-10-CM

## 2023-06-17 LAB
ALBUMIN SERPL-MCNC: 2.7 G/DL (ref 3.5–5)
ALBUMIN/GLOB SERPL: 0.7 (ref 1.1–2.2)
ALP SERPL-CCNC: 83 U/L (ref 45–117)
ALT SERPL-CCNC: 10 U/L (ref 12–78)
ANION GAP SERPL CALC-SCNC: 11 MMOL/L (ref 5–15)
APPEARANCE UR: ABNORMAL
AST SERPL-CCNC: 10 U/L (ref 15–37)
BACTERIA URNS QL MICRO: ABNORMAL /HPF
BASOPHILS # BLD: 0 K/UL (ref 0–0.1)
BASOPHILS NFR BLD: 0 % (ref 0–1)
BILIRUB SERPL-MCNC: 0.4 MG/DL (ref 0.2–1)
BILIRUB UR QL CFM: NEGATIVE
BUN SERPL-MCNC: 10 MG/DL (ref 6–20)
BUN/CREAT SERPL: 15 (ref 12–20)
CALCIUM SERPL-MCNC: 8 MG/DL (ref 8.5–10.1)
CHLORIDE SERPL-SCNC: 100 MMOL/L (ref 97–108)
CO2 SERPL-SCNC: 28 MMOL/L (ref 21–32)
COLOR UR: ABNORMAL
CREAT SERPL-MCNC: 0.68 MG/DL (ref 0.55–1.02)
DIFFERENTIAL METHOD BLD: ABNORMAL
EKG ATRIAL RATE: 89 BPM
EKG DIAGNOSIS: NORMAL
EKG P AXIS: 69 DEGREES
EKG P-R INTERVAL: 162 MS
EKG Q-T INTERVAL: 372 MS
EKG QRS DURATION: 82 MS
EKG QTC CALCULATION (BAZETT): 452 MS
EKG R AXIS: 23 DEGREES
EKG T AXIS: 0 DEGREES
EKG VENTRICULAR RATE: 89 BPM
EOSINOPHIL # BLD: 0.1 K/UL (ref 0–0.4)
EOSINOPHIL NFR BLD: 1 % (ref 0–7)
EPITH CASTS URNS QL MICRO: ABNORMAL /LPF
ERYTHROCYTE [DISTWIDTH] IN BLOOD BY AUTOMATED COUNT: 18.8 % (ref 11.5–14.5)
GLOBULIN SER CALC-MCNC: 3.7 G/DL (ref 2–4)
GLUCOSE SERPL-MCNC: 105 MG/DL (ref 65–100)
GLUCOSE UR STRIP.AUTO-MCNC: NEGATIVE MG/DL
HCT VFR BLD AUTO: 29.3 % (ref 35–47)
HGB BLD-MCNC: 8.5 G/DL (ref 11.5–16)
HGB UR QL STRIP: ABNORMAL
IMM GRANULOCYTES # BLD AUTO: 0 K/UL (ref 0–0.04)
IMM GRANULOCYTES NFR BLD AUTO: 0 % (ref 0–0.5)
INR PPP: 1.1 (ref 0.9–1.1)
KETONES UR QL STRIP.AUTO: 15 MG/DL
LEUKOCYTE ESTERASE UR QL STRIP.AUTO: NEGATIVE
LYMPHOCYTES # BLD: 1.1 K/UL (ref 0.8–3.5)
LYMPHOCYTES NFR BLD: 10 % (ref 12–49)
MAGNESIUM SERPL-MCNC: 1.3 MG/DL (ref 1.6–2.4)
MCH RBC QN AUTO: 20.4 PG (ref 26–34)
MCHC RBC AUTO-ENTMCNC: 29 G/DL (ref 30–36.5)
MCV RBC AUTO: 70.3 FL (ref 80–99)
MONOCYTES # BLD: 0.5 K/UL (ref 0–1)
MONOCYTES NFR BLD: 4 % (ref 5–13)
NEUTS SEG # BLD: 9.1 K/UL (ref 1.8–8)
NEUTS SEG NFR BLD: 85 % (ref 32–75)
NITRITE UR QL STRIP.AUTO: NEGATIVE
NRBC # BLD: 0 K/UL (ref 0–0.01)
NRBC BLD-RTO: 0 PER 100 WBC
PH UR STRIP: 7 (ref 5–8)
PLATELET # BLD AUTO: 524 K/UL (ref 150–400)
PMV BLD AUTO: 8.8 FL (ref 8.9–12.9)
POTASSIUM SERPL-SCNC: 3.5 MMOL/L (ref 3.5–5.1)
PROT SERPL-MCNC: 6.4 G/DL (ref 6.4–8.2)
PROT UR STRIP-MCNC: ABNORMAL MG/DL
PROTHROMBIN TIME: 11 SEC (ref 9–11.1)
RBC # BLD AUTO: 4.17 M/UL (ref 3.8–5.2)
RBC #/AREA URNS HPF: ABNORMAL /HPF (ref 0–5)
SODIUM SERPL-SCNC: 139 MMOL/L (ref 136–145)
SP GR UR REFRACTOMETRY: 1.01 (ref 1–1.03)
UROBILINOGEN UR QL STRIP.AUTO: 0.2 EU/DL (ref 0.2–1)
WBC # BLD AUTO: 10.7 K/UL (ref 3.6–11)
WBC URNS QL MICRO: ABNORMAL /HPF (ref 0–4)

## 2023-06-17 PROCEDURE — 81001 URINALYSIS AUTO W/SCOPE: CPT

## 2023-06-17 PROCEDURE — 71045 X-RAY EXAM CHEST 1 VIEW: CPT

## 2023-06-17 PROCEDURE — 6360000004 HC RX CONTRAST MEDICATION: Performed by: EMERGENCY MEDICINE

## 2023-06-17 PROCEDURE — 83735 ASSAY OF MAGNESIUM: CPT

## 2023-06-17 PROCEDURE — 80053 COMPREHEN METABOLIC PANEL: CPT

## 2023-06-17 PROCEDURE — 74177 CT ABD & PELVIS W/CONTRAST: CPT

## 2023-06-17 PROCEDURE — 99285 EMERGENCY DEPT VISIT HI MDM: CPT

## 2023-06-17 PROCEDURE — 36415 COLL VENOUS BLD VENIPUNCTURE: CPT

## 2023-06-17 PROCEDURE — 85025 COMPLETE CBC W/AUTO DIFF WBC: CPT

## 2023-06-17 PROCEDURE — 85610 PROTHROMBIN TIME: CPT

## 2023-06-17 PROCEDURE — 93005 ELECTROCARDIOGRAM TRACING: CPT | Performed by: EMERGENCY MEDICINE

## 2023-06-17 RX ORDER — PHENAZOPYRIDINE HYDROCHLORIDE 100 MG/1
200 TABLET, FILM COATED ORAL ONCE
Status: DISCONTINUED | OUTPATIENT
Start: 2023-06-17 | End: 2023-06-17

## 2023-06-17 RX ORDER — ACETAMINOPHEN 500 MG
1000 TABLET ORAL
Status: DISCONTINUED | OUTPATIENT
Start: 2023-06-17 | End: 2023-06-17

## 2023-06-17 RX ADMIN — IOPAMIDOL 100 ML: 612 INJECTION, SOLUTION INTRAVENOUS at 10:22

## 2023-06-17 ASSESSMENT — LIFESTYLE VARIABLES
HOW OFTEN DO YOU HAVE A DRINK CONTAINING ALCOHOL: NEVER
HOW OFTEN DO YOU HAVE A DRINK CONTAINING ALCOHOL: NEVER
HOW MANY STANDARD DRINKS CONTAINING ALCOHOL DO YOU HAVE ON A TYPICAL DAY: PATIENT DOES NOT DRINK

## 2023-06-17 ASSESSMENT — PAIN SCALES - GENERAL: PAINLEVEL_OUTOF10: 10

## 2023-06-17 ASSESSMENT — PAIN DESCRIPTION - ORIENTATION: ORIENTATION: LEFT

## 2023-06-17 ASSESSMENT — PAIN DESCRIPTION - DESCRIPTORS: DESCRIPTORS: ACHING

## 2023-06-17 ASSESSMENT — PAIN DESCRIPTION - PAIN TYPE: TYPE: ACUTE PAIN

## 2023-06-17 ASSESSMENT — PAIN DESCRIPTION - LOCATION: LOCATION: FLANK

## 2023-06-17 ASSESSMENT — PAIN DESCRIPTION - ONSET: ONSET: PROGRESSIVE

## 2023-06-17 ASSESSMENT — PAIN - FUNCTIONAL ASSESSMENT: PAIN_FUNCTIONAL_ASSESSMENT: 0-10

## 2023-06-17 NOTE — ED NOTES
I have reviewed discharge instructions with the patient and spouse. The patient and spouse verbalized understanding. Discharge medications discussed with patient. No questions at this time. Ambulated without difficulty.       Hayden Chirinos RN  21/98/44 5903

## 2023-06-17 NOTE — ED NOTES
Pt assisted to restroom, hat placed in toilet for assisting pt with obtaining urine speicmen.      Albania Eastman RN  72/99/76 0177

## 2023-06-17 NOTE — ED NOTES
Pt has returned from CT. Pt unable to urinate at this time, reports she has not had anything to drink yet. Pt  frequently asking if he can retrieve her daily meds. ED Provider to be informed when available.      Vinicius Maria RN  69/49/58 5565

## 2023-06-17 NOTE — ED NOTES
Pt provided with water sips, has requested meal, told we would hold off until CT results. Pt  has gone home to return with pt meds for appropriate med rec.      Macario Holcomb RN  65/88/14 2233

## 2023-06-17 NOTE — DISCHARGE INSTRUCTIONS
You have a pericardial effusion. This is fluid around your heart that can be dangerous or life-threatening. This was first seen in March 2023 and you were sent by your doctor to see a cardiologist.  Please call that cardiologist on Monday morning to make an appointment. Come back to the emergency department immediately if you start to experience a sudden worsening of shortness of breath.

## 2023-06-17 NOTE — ED PROVIDER NOTES
acid 500 MG tablet  Commonly known as: VITAMIN C     atorvastatin 20 MG tablet  Commonly known as: LIPITOR     dilTIAZem 360 MG extended release capsule  Commonly known as: TIAZAC     ferrous sulfate 325 (65 Fe) MG tablet  Commonly known as: IRON 325     guaiFENesin 1200 MG Tb12     hydroCHLOROthiazide 50 MG tablet  Commonly known as: HYDRODIURIL     metFORMIN 1000 MG tablet  Commonly known as: GLUCOPHAGE     montelukast 10 MG tablet  Commonly known as: SINGULAIR     triamcinolone 0.1 % cream  Commonly known as: KENALOG                DISCONTINUED MEDICATIONS:  Discharge Medication List as of 6/17/2023 11:58 AM          I am the Primary Clinician of Record. Tor Arreola MD (electronically signed)      (Please note that parts of this dictation were completed with voice recognition software. Quite often unanticipated grammatical, syntax, homophones, and other interpretive errors are inadvertently transcribed by the computer software. Please disregards these errors.  Please excuse any errors that have escaped final proofreading.)         Tor Arreola MD  06/17/23 7475

## 2023-06-17 NOTE — ED NOTES
Received assignment, assuming care. Pt  at bedside, answering some questions, awaiting return of wife from imaging.      Nahun Cavazos RN  94/55/12 [facility]  03/32/29 0039

## 2023-06-17 NOTE — ED NOTES
Pt transported to CT scan with [employer] by [employer] at now.       Nelson Downey RN  03/56/45 1013

## 2023-06-18 ENCOUNTER — HOSPITAL ENCOUNTER (EMERGENCY)
Facility: HOSPITAL | Age: 63
Discharge: HOME | End: 2023-06-19
Attending: FAMILY MEDICINE | Admitting: FAMILY MEDICINE
Payer: COMMERCIAL

## 2023-06-18 VITALS
RESPIRATION RATE: 20 BRPM | WEIGHT: 293 LBS | HEART RATE: 97 BPM | SYSTOLIC BLOOD PRESSURE: 181 MMHG | TEMPERATURE: 98.5 F | BODY MASS INDEX: 49.09 KG/M2 | OXYGEN SATURATION: 97 % | DIASTOLIC BLOOD PRESSURE: 80 MMHG

## 2023-06-18 DIAGNOSIS — H66.003 NON-RECURRENT ACUTE SUPPURATIVE OTITIS MEDIA OF BOTH EARS WITHOUT SPONTANEOUS RUPTURE OF TYMPANIC MEMBRANES: Primary | ICD-10-CM

## 2023-06-18 DIAGNOSIS — J01.00 ACUTE MAXILLARY SINUSITIS, RECURRENCE NOT SPECIFIED: ICD-10-CM

## 2023-06-18 PROCEDURE — 6370000000 HC RX 637 (ALT 250 FOR IP): Performed by: FAMILY MEDICINE

## 2023-06-18 PROCEDURE — 99283 EMERGENCY DEPT VISIT LOW MDM: CPT

## 2023-06-18 RX ORDER — AMOXICILLIN 250 MG/1
500 CAPSULE ORAL ONCE
Status: COMPLETED | OUTPATIENT
Start: 2023-06-18 | End: 2023-06-18

## 2023-06-18 RX ORDER — AMOXICILLIN 500 MG/1
500 CAPSULE ORAL 3 TIMES DAILY
Qty: 21 CAPSULE | Refills: 0 | Status: SHIPPED | OUTPATIENT
Start: 2023-06-18 | End: 2023-06-25

## 2023-06-18 RX ADMIN — AMOXICILLIN 500 MG: 250 CAPSULE ORAL at 23:26

## 2023-06-18 ASSESSMENT — PAIN SCALES - GENERAL: PAINLEVEL_OUTOF10: 8

## 2023-06-18 ASSESSMENT — PAIN - FUNCTIONAL ASSESSMENT: PAIN_FUNCTIONAL_ASSESSMENT: 0-10

## 2023-06-19 ASSESSMENT — PAIN SCALES - GENERAL: PAINLEVEL_OUTOF10: 0

## 2023-06-19 ASSESSMENT — PAIN - FUNCTIONAL ASSESSMENT: PAIN_FUNCTIONAL_ASSESSMENT: NONE - DENIES PAIN

## 2023-06-19 NOTE — ED NOTES
Assigned pt, pt in waiting room, to be moved to room to be assessed by ED Provider when room available.      Jack Dallas RN  14/32/79 7065

## 2023-06-19 NOTE — DISCHARGE INSTRUCTIONS
--Amoxicillin 500 mg 3 times daily for the next 7 days. --Tylenol Extra Strength 2 tabs every 6 hours as needed for pain.

## 2023-06-20 NOTE — ED PROVIDER NOTES
Mouth: Mucous membranes are moist.   Eyes:      Pupils: Pupils are equal, round, and reactive to light. Cardiovascular:      Rate and Rhythm: Normal rate. Pulmonary:      Effort: Pulmonary effort is normal.      Breath sounds: Normal breath sounds. Abdominal:      Palpations: Abdomen is soft. Tenderness: There is no abdominal tenderness. Musculoskeletal:      Cervical back: Normal range of motion and neck supple. Neurological:      Mental Status: She is alert. PROCEDURES   Unless otherwise noted below, none  Procedures       CRITICAL CARE TIME   0    SCREENINGS   NIH Stroke Score       Heart Score       Curb-65          EMERGENCY DEPARTMENT COURSE and DIFFERENTIAL DIAGNOSIS/MDM   Vitals:    Vitals:    06/18/23 2030   BP: (!) 181/80   Pulse: 97   Resp: 20   Temp: 98.5 °F (36.9 °C)   TempSrc: Oral   SpO2: 97%   Weight: 133.8 kg (295 lb)            Patient was given the following medications:  Medications   amoxicillin (AMOXIL) capsule 500 mg (500 mg Oral Given 6/18/23 2326)       CONSULTS: (Who and What was discussed)  None      Chronic Conditions: Seasonal allergies    Social Determinants affecting Dx or Tx: None    Records Reviewed (source and summary of external notes): Nursing Notes and Old Medical Records    CC/HPI Summary, DDx, ED Course, and Reassessment:   Pt with seasonal allergies, chronic sinus infections, now with sx's for the last several days. Will treat with amoxicillin for 7 days. Also will encourage antihistamines for allergies. Disposition Considerations (Tests not done, Shared Decision Making, Pt Expectation of Test or Tx.): 0     FINAL IMPRESSION     1. Non-recurrent acute suppurative otitis media of both ears without spontaneous rupture of tympanic membranes    2. Acute maxillary sinusitis, recurrence not specified          DISPOSITION/PLAN   DISPOSITION Decision To Discharge 06/18/2023 10:28:30 PM      Discharge Note: The patient is stable for discharge home.

## 2023-08-30 ENCOUNTER — HOSPITAL ENCOUNTER (INPATIENT)
Facility: HOSPITAL | Age: 63
LOS: 6 days | Discharge: HOME HEALTH | DRG: 461 | End: 2023-09-05
Attending: FAMILY MEDICINE | Admitting: HOSPITALIST
Payer: COMMERCIAL

## 2023-08-30 ENCOUNTER — APPOINTMENT (OUTPATIENT)
Facility: HOSPITAL | Age: 63
DRG: 461 | End: 2023-08-30
Attending: FAMILY MEDICINE
Payer: COMMERCIAL

## 2023-08-30 ENCOUNTER — APPOINTMENT (OUTPATIENT)
Facility: HOSPITAL | Age: 63
DRG: 461 | End: 2023-08-30
Attending: INTERNAL MEDICINE
Payer: COMMERCIAL

## 2023-08-30 ENCOUNTER — APPOINTMENT (OUTPATIENT)
Facility: HOSPITAL | Age: 63
End: 2023-08-30
Payer: COMMERCIAL

## 2023-08-30 ENCOUNTER — HOSPITAL ENCOUNTER (EMERGENCY)
Facility: HOSPITAL | Age: 63
Discharge: ANOTHER HOSPITAL | End: 2023-08-30
Attending: EMERGENCY MEDICINE
Payer: COMMERCIAL

## 2023-08-30 VITALS
HEART RATE: 77 BPM | TEMPERATURE: 97.9 F | WEIGHT: 293 LBS | BODY MASS INDEX: 48.82 KG/M2 | OXYGEN SATURATION: 93 % | HEIGHT: 65 IN | SYSTOLIC BLOOD PRESSURE: 182 MMHG | DIASTOLIC BLOOD PRESSURE: 77 MMHG | RESPIRATION RATE: 19 BRPM

## 2023-08-30 DIAGNOSIS — I31.39 PERICARDIAL EFFUSION: Primary | ICD-10-CM

## 2023-08-30 DIAGNOSIS — R06.02 SHORTNESS OF BREATH: Primary | ICD-10-CM

## 2023-08-30 DIAGNOSIS — I31.39 PERICARDIAL EFFUSION: ICD-10-CM

## 2023-08-30 LAB
ALBUMIN SERPL-MCNC: 3.3 G/DL (ref 3.5–5)
ALBUMIN/GLOB SERPL: 1 (ref 1.1–2.2)
ALP SERPL-CCNC: 71 U/L (ref 45–117)
ALT SERPL-CCNC: 11 U/L (ref 12–78)
ANION GAP SERPL CALC-SCNC: 12 MMOL/L (ref 5–15)
AST SERPL-CCNC: 18 U/L (ref 15–37)
BASOPHILS # BLD: 0 K/UL (ref 0–0.1)
BASOPHILS NFR BLD: 0 % (ref 0–1)
BILIRUB SERPL-MCNC: 0.5 MG/DL (ref 0.2–1)
BUN SERPL-MCNC: 11 MG/DL (ref 6–20)
BUN/CREAT SERPL: 14 (ref 12–20)
CALCIUM SERPL-MCNC: 8.6 MG/DL (ref 8.5–10.1)
CHLORIDE SERPL-SCNC: 102 MMOL/L (ref 97–108)
CO2 SERPL-SCNC: 25 MMOL/L (ref 21–32)
CREAT SERPL-MCNC: 0.81 MG/DL (ref 0.55–1.02)
DIFFERENTIAL METHOD BLD: ABNORMAL
ECHO BSA: 2.48 M2
EOSINOPHIL # BLD: 0.1 K/UL (ref 0–0.4)
EOSINOPHIL NFR BLD: 1 % (ref 0–7)
ERYTHROCYTE [DISTWIDTH] IN BLOOD BY AUTOMATED COUNT: 21.4 % (ref 11.5–14.5)
FLUAV RNA SPEC QL NAA+PROBE: NOT DETECTED
FLUBV RNA SPEC QL NAA+PROBE: NOT DETECTED
GLOBULIN SER CALC-MCNC: 3.4 G/DL (ref 2–4)
GLUCOSE BLD STRIP.AUTO-MCNC: 120 MG/DL (ref 65–117)
GLUCOSE BLD STRIP.AUTO-MCNC: 120 MG/DL (ref 65–117)
GLUCOSE SERPL-MCNC: 119 MG/DL (ref 65–100)
HCT VFR BLD AUTO: 28.1 % (ref 35–47)
HGB BLD-MCNC: 7.9 G/DL (ref 11.5–16)
IMM GRANULOCYTES # BLD AUTO: 0.1 K/UL (ref 0–0.04)
IMM GRANULOCYTES NFR BLD AUTO: 1 % (ref 0–0.5)
LYMPHOCYTES # BLD: 1.5 K/UL (ref 0.8–3.5)
LYMPHOCYTES NFR BLD: 19 % (ref 12–49)
MCH RBC QN AUTO: 20.5 PG (ref 26–34)
MCHC RBC AUTO-ENTMCNC: 28.1 G/DL (ref 30–36.5)
MCV RBC AUTO: 72.8 FL (ref 80–99)
MONOCYTES # BLD: 0.6 K/UL (ref 0–1)
MONOCYTES NFR BLD: 7 % (ref 5–13)
NEUTS SEG # BLD: 5.8 K/UL (ref 1.8–8)
NEUTS SEG NFR BLD: 72 % (ref 32–75)
NRBC # BLD: 0.02 K/UL (ref 0–0.01)
NRBC BLD-RTO: 0.2 PER 100 WBC
NT PRO BNP: 131 PG/ML (ref 0–125)
PLATELET # BLD AUTO: 543 K/UL (ref 150–400)
PMV BLD AUTO: 9.3 FL (ref 8.9–12.9)
POTASSIUM SERPL-SCNC: 4.1 MMOL/L (ref 3.5–5.1)
PROT SERPL-MCNC: 6.7 G/DL (ref 6.4–8.2)
RBC # BLD AUTO: 3.86 M/UL (ref 3.8–5.2)
RBC MORPH BLD: ABNORMAL
SARS-COV-2 RNA RESP QL NAA+PROBE: NOT DETECTED
SERVICE CMNT-IMP: ABNORMAL
SERVICE CMNT-IMP: ABNORMAL
SODIUM SERPL-SCNC: 139 MMOL/L (ref 136–145)
TROPONIN I SERPL HS-MCNC: 7 NG/L (ref 0–51)
WBC # BLD AUTO: 8.1 K/UL (ref 3.6–11)

## 2023-08-30 PROCEDURE — 96374 THER/PROPH/DIAG INJ IV PUSH: CPT

## 2023-08-30 PROCEDURE — 6360000002 HC RX W HCPCS: Performed by: EMERGENCY MEDICINE

## 2023-08-30 PROCEDURE — 71250 CT THORAX DX C-: CPT

## 2023-08-30 PROCEDURE — 87636 SARSCOV2 & INF A&B AMP PRB: CPT

## 2023-08-30 PROCEDURE — 82962 GLUCOSE BLOOD TEST: CPT

## 2023-08-30 PROCEDURE — 6360000002 HC RX W HCPCS: Performed by: HOSPITALIST

## 2023-08-30 PROCEDURE — 6370000000 HC RX 637 (ALT 250 FOR IP): Performed by: HOSPITALIST

## 2023-08-30 PROCEDURE — 6370000000 HC RX 637 (ALT 250 FOR IP): Performed by: EMERGENCY MEDICINE

## 2023-08-30 PROCEDURE — 83880 ASSAY OF NATRIURETIC PEPTIDE: CPT

## 2023-08-30 PROCEDURE — 93306 TTE W/DOPPLER COMPLETE: CPT

## 2023-08-30 PROCEDURE — 80053 COMPREHEN METABOLIC PANEL: CPT

## 2023-08-30 PROCEDURE — 93005 ELECTROCARDIOGRAM TRACING: CPT | Performed by: EMERGENCY MEDICINE

## 2023-08-30 PROCEDURE — 71046 X-RAY EXAM CHEST 2 VIEWS: CPT

## 2023-08-30 PROCEDURE — 2580000003 HC RX 258: Performed by: HOSPITALIST

## 2023-08-30 PROCEDURE — 84484 ASSAY OF TROPONIN QUANT: CPT

## 2023-08-30 PROCEDURE — 94640 AIRWAY INHALATION TREATMENT: CPT

## 2023-08-30 PROCEDURE — 99285 EMERGENCY DEPT VISIT HI MDM: CPT

## 2023-08-30 PROCEDURE — 36415 COLL VENOUS BLD VENIPUNCTURE: CPT

## 2023-08-30 PROCEDURE — 2060000000 HC ICU INTERMEDIATE R&B

## 2023-08-30 PROCEDURE — 85025 COMPLETE CBC W/AUTO DIFF WBC: CPT

## 2023-08-30 RX ORDER — HYDROCHLOROTHIAZIDE 25 MG/1
50 TABLET ORAL DAILY
Status: DISCONTINUED | OUTPATIENT
Start: 2023-08-30 | End: 2023-09-05 | Stop reason: HOSPADM

## 2023-08-30 RX ORDER — ATORVASTATIN CALCIUM 10 MG/1
10 TABLET, FILM COATED ORAL NIGHTLY
Status: DISCONTINUED | OUTPATIENT
Start: 2023-08-30 | End: 2023-09-05 | Stop reason: HOSPADM

## 2023-08-30 RX ORDER — INSULIN LISPRO 100 [IU]/ML
0-4 INJECTION, SOLUTION INTRAVENOUS; SUBCUTANEOUS NIGHTLY
Status: DISCONTINUED | OUTPATIENT
Start: 2023-08-30 | End: 2023-09-05 | Stop reason: HOSPADM

## 2023-08-30 RX ORDER — IPRATROPIUM BROMIDE AND ALBUTEROL SULFATE 2.5; .5 MG/3ML; MG/3ML
1 SOLUTION RESPIRATORY (INHALATION) EVERY 4 HOURS PRN
Status: DISCONTINUED | OUTPATIENT
Start: 2023-08-30 | End: 2023-09-05 | Stop reason: HOSPADM

## 2023-08-30 RX ORDER — GUAIFENESIN 600 MG/1
600 TABLET, EXTENDED RELEASE ORAL 2 TIMES DAILY
Status: DISCONTINUED | OUTPATIENT
Start: 2023-08-30 | End: 2023-09-05 | Stop reason: HOSPADM

## 2023-08-30 RX ORDER — ONDANSETRON 4 MG/1
4 TABLET, ORALLY DISINTEGRATING ORAL EVERY 8 HOURS PRN
Status: DISCONTINUED | OUTPATIENT
Start: 2023-08-30 | End: 2023-09-05 | Stop reason: HOSPADM

## 2023-08-30 RX ORDER — POLYETHYLENE GLYCOL 3350 17 G/17G
17 POWDER, FOR SOLUTION ORAL DAILY PRN
Status: DISCONTINUED | OUTPATIENT
Start: 2023-08-30 | End: 2023-09-05 | Stop reason: HOSPADM

## 2023-08-30 RX ORDER — SODIUM CHLORIDE 9 MG/ML
INJECTION, SOLUTION INTRAVENOUS PRN
Status: DISCONTINUED | OUTPATIENT
Start: 2023-08-30 | End: 2023-09-05 | Stop reason: HOSPADM

## 2023-08-30 RX ORDER — DILTIAZEM HYDROCHLORIDE 180 MG/1
360 CAPSULE, COATED, EXTENDED RELEASE ORAL DAILY
Status: DISCONTINUED | OUTPATIENT
Start: 2023-08-30 | End: 2023-09-05 | Stop reason: HOSPADM

## 2023-08-30 RX ORDER — HYDRALAZINE HYDROCHLORIDE 25 MG/1
25 TABLET, FILM COATED ORAL EVERY 8 HOURS SCHEDULED
Status: DISCONTINUED | OUTPATIENT
Start: 2023-08-30 | End: 2023-08-30

## 2023-08-30 RX ORDER — ENOXAPARIN SODIUM 100 MG/ML
40 INJECTION SUBCUTANEOUS DAILY
Status: DISCONTINUED | OUTPATIENT
Start: 2023-08-30 | End: 2023-08-30

## 2023-08-30 RX ORDER — SODIUM CHLORIDE 0.9 % (FLUSH) 0.9 %
5-40 SYRINGE (ML) INJECTION PRN
Status: DISCONTINUED | OUTPATIENT
Start: 2023-08-30 | End: 2023-09-05 | Stop reason: HOSPADM

## 2023-08-30 RX ORDER — DEXTROSE MONOHYDRATE 100 MG/ML
INJECTION, SOLUTION INTRAVENOUS CONTINUOUS PRN
Status: DISCONTINUED | OUTPATIENT
Start: 2023-08-30 | End: 2023-09-05 | Stop reason: HOSPADM

## 2023-08-30 RX ORDER — ACETAMINOPHEN 325 MG/1
650 TABLET ORAL EVERY 6 HOURS PRN
Status: DISCONTINUED | OUTPATIENT
Start: 2023-08-30 | End: 2023-09-05 | Stop reason: HOSPADM

## 2023-08-30 RX ORDER — ACETAMINOPHEN 650 MG/1
650 SUPPOSITORY RECTAL EVERY 6 HOURS PRN
Status: DISCONTINUED | OUTPATIENT
Start: 2023-08-30 | End: 2023-09-05 | Stop reason: HOSPADM

## 2023-08-30 RX ORDER — HEPARIN SODIUM 5000 [USP'U]/ML
5000 INJECTION, SOLUTION INTRAVENOUS; SUBCUTANEOUS EVERY 8 HOURS SCHEDULED
Status: DISCONTINUED | OUTPATIENT
Start: 2023-08-30 | End: 2023-09-05 | Stop reason: HOSPADM

## 2023-08-30 RX ORDER — IPRATROPIUM BROMIDE AND ALBUTEROL SULFATE 2.5; .5 MG/3ML; MG/3ML
1 SOLUTION RESPIRATORY (INHALATION)
Status: COMPLETED | OUTPATIENT
Start: 2023-08-30 | End: 2023-08-30

## 2023-08-30 RX ORDER — TORSEMIDE 20 MG/1
5 TABLET ORAL DAILY
Status: DISCONTINUED | OUTPATIENT
Start: 2023-08-30 | End: 2023-09-05 | Stop reason: HOSPADM

## 2023-08-30 RX ORDER — HYDRALAZINE HYDROCHLORIDE 20 MG/ML
10 INJECTION INTRAMUSCULAR; INTRAVENOUS EVERY 6 HOURS PRN
Status: DISCONTINUED | OUTPATIENT
Start: 2023-08-30 | End: 2023-09-05 | Stop reason: HOSPADM

## 2023-08-30 RX ORDER — SODIUM CHLORIDE 0.9 % (FLUSH) 0.9 %
5-40 SYRINGE (ML) INJECTION EVERY 12 HOURS SCHEDULED
Status: DISCONTINUED | OUTPATIENT
Start: 2023-08-30 | End: 2023-09-05 | Stop reason: HOSPADM

## 2023-08-30 RX ORDER — INSULIN LISPRO 100 [IU]/ML
0-8 INJECTION, SOLUTION INTRAVENOUS; SUBCUTANEOUS
Status: DISCONTINUED | OUTPATIENT
Start: 2023-08-30 | End: 2023-09-05 | Stop reason: HOSPADM

## 2023-08-30 RX ORDER — FERROUS SULFATE 325(65) MG
325 TABLET ORAL 2 TIMES DAILY WITH MEALS
Status: DISCONTINUED | OUTPATIENT
Start: 2023-08-30 | End: 2023-09-05 | Stop reason: HOSPADM

## 2023-08-30 RX ORDER — CLONIDINE HYDROCHLORIDE 0.2 MG/1
0.2 TABLET ORAL 2 TIMES DAILY
Status: DISCONTINUED | OUTPATIENT
Start: 2023-08-30 | End: 2023-09-05 | Stop reason: HOSPADM

## 2023-08-30 RX ORDER — HYDRALAZINE HYDROCHLORIDE 25 MG/1
25 TABLET, FILM COATED ORAL EVERY 8 HOURS SCHEDULED
Status: DISCONTINUED | OUTPATIENT
Start: 2023-08-31 | End: 2023-09-05 | Stop reason: HOSPADM

## 2023-08-30 RX ORDER — FUROSEMIDE 10 MG/ML
40 INJECTION INTRAMUSCULAR; INTRAVENOUS
Status: COMPLETED | OUTPATIENT
Start: 2023-08-30 | End: 2023-08-30

## 2023-08-30 RX ORDER — ONDANSETRON 2 MG/ML
4 INJECTION INTRAMUSCULAR; INTRAVENOUS EVERY 6 HOURS PRN
Status: DISCONTINUED | OUTPATIENT
Start: 2023-08-30 | End: 2023-09-05 | Stop reason: HOSPADM

## 2023-08-30 RX ORDER — SPIRONOLACTONE 25 MG/1
50 TABLET ORAL DAILY
Status: DISCONTINUED | OUTPATIENT
Start: 2023-08-30 | End: 2023-09-05 | Stop reason: HOSPADM

## 2023-08-30 RX ADMIN — GUAIFENESIN 600 MG: 600 TABLET, EXTENDED RELEASE ORAL at 15:05

## 2023-08-30 RX ADMIN — TORSEMIDE 5 MG: 20 TABLET ORAL at 15:15

## 2023-08-30 RX ADMIN — SODIUM CHLORIDE, PRESERVATIVE FREE 10 ML: 5 INJECTION INTRAVENOUS at 20:35

## 2023-08-30 RX ADMIN — DILTIAZEM HYDROCHLORIDE 360 MG: 180 CAPSULE, COATED, EXTENDED RELEASE ORAL at 15:05

## 2023-08-30 RX ADMIN — HEPARIN SODIUM 5000 UNITS: 5000 INJECTION INTRAVENOUS; SUBCUTANEOUS at 15:15

## 2023-08-30 RX ADMIN — HYDROCHLOROTHIAZIDE 50 MG: 25 TABLET ORAL at 15:05

## 2023-08-30 RX ADMIN — ATORVASTATIN CALCIUM 10 MG: 10 TABLET, FILM COATED ORAL at 20:35

## 2023-08-30 RX ADMIN — FERROUS SULFATE TAB 325 MG (65 MG ELEMENTAL FE) 325 MG: 325 (65 FE) TAB at 18:55

## 2023-08-30 RX ADMIN — CLONIDINE HYDROCHLORIDE 0.2 MG: 0.2 TABLET ORAL at 20:35

## 2023-08-30 RX ADMIN — GUAIFENESIN 600 MG: 600 TABLET, EXTENDED RELEASE ORAL at 20:35

## 2023-08-30 RX ADMIN — IPRATROPIUM BROMIDE AND ALBUTEROL SULFATE 1 DOSE: 2.5; .5 SOLUTION RESPIRATORY (INHALATION) at 08:54

## 2023-08-30 RX ADMIN — FUROSEMIDE 40 MG: 10 INJECTION, SOLUTION INTRAMUSCULAR; INTRAVENOUS at 11:03

## 2023-08-30 RX ADMIN — ACETAMINOPHEN 650 MG: 325 TABLET ORAL at 20:35

## 2023-08-30 RX ADMIN — HEPARIN SODIUM 5000 UNITS: 5000 INJECTION INTRAVENOUS; SUBCUTANEOUS at 22:24

## 2023-08-30 RX ADMIN — SPIRONOLACTONE 50 MG: 25 TABLET ORAL at 15:15

## 2023-08-30 RX ADMIN — HYDRALAZINE HYDROCHLORIDE 25 MG: 25 TABLET, FILM COATED ORAL at 15:14

## 2023-08-30 ASSESSMENT — PAIN - FUNCTIONAL ASSESSMENT: PAIN_FUNCTIONAL_ASSESSMENT: 0-10

## 2023-08-30 ASSESSMENT — ENCOUNTER SYMPTOMS
SHORTNESS OF BREATH: 1
ABDOMINAL PAIN: 0
NAUSEA: 0
COUGH: 0
VOMITING: 0
RHINORRHEA: 0

## 2023-08-30 ASSESSMENT — PAIN DESCRIPTION - DESCRIPTORS
DESCRIPTORS: ACHING
DESCRIPTORS: ACHING

## 2023-08-30 ASSESSMENT — LIFESTYLE VARIABLES
HOW MANY STANDARD DRINKS CONTAINING ALCOHOL DO YOU HAVE ON A TYPICAL DAY: PATIENT DOES NOT DRINK
HOW OFTEN DO YOU HAVE A DRINK CONTAINING ALCOHOL: NEVER

## 2023-08-30 ASSESSMENT — PAIN SCALES - GENERAL
PAINLEVEL_OUTOF10: 8
PAINLEVEL_OUTOF10: 2
PAINLEVEL_OUTOF10: 0

## 2023-08-30 ASSESSMENT — PAIN DESCRIPTION - LOCATION
LOCATION: HEAD
LOCATION: HEAD

## 2023-08-30 NOTE — PROGRESS NOTES
[facility] ALS transport to [facility] 2N Tele Room 210. Cardiac Monitor and 2L NC.  ETA:  crew onsite.

## 2023-08-30 NOTE — ED NOTES
Pt accepted to [facility] ([phone]). NS made aware to arrange transport. Pt on 2L NC.       Brenda Carter RN  08/30/23 0216

## 2023-08-30 NOTE — ED NOTES
TRANSFER - OUT REPORT:    Verbal report given to Berkley Palomo RN on Brittany Adamson  being transferred to [address] [place] [address] for routine progression of patient care       Report consisted of patient's Situation, Background, Assessment and   Recommendations(SBAR). Information from the following report(s) Nurse Handoff Report, Index, MAR, Recent Results, and Med Rec Status was reviewed with the receiving nurse. [place] Assessment:    Presents to emergency department  because of falls (Syncope, seizure, or loss of consciousness): No  Age > 70: No  Altered Mental Status, Intoxication with alcohol or substance confusion (Disorientation, impaired judgment, poor safety awaremess, or inability to follow instructions): No  Impaired Mobility: Ambulates or transfers with assistive devices or assistance; Unable to ambulate or transer.: No  Nursing Judgement: No          Lines:   Peripheral IV 08/30/23 Left;Posterior Hand (Active)   Site Assessment Clean, dry & intact 08/30/23 0853   Line Status Blood return noted 08/30/23 0853   Phlebitis Assessment No symptoms 08/30/23 0853   Infiltration Assessment 0 08/30/23 0853   Dressing Status Clean, dry & intact 08/30/23 0853   Dressing Type Transparent 08/30/23 0853   Dressing Intervention New 08/30/23 0853        Opportunity for questions and clarification was provided.       Patient transported with:  Michael Cisneros RN  08/30/23 7543

## 2023-08-30 NOTE — ED TRIAGE NOTES
Pt arrived with c/o shortness of breath that has been going for 2 weeks, has gotten progressively worse.  She has a hx of asthma and has been using treatments and albuterol with no relief

## 2023-08-31 ENCOUNTER — APPOINTMENT (OUTPATIENT)
Facility: HOSPITAL | Age: 63
DRG: 461 | End: 2023-08-31
Attending: INTERNAL MEDICINE
Payer: COMMERCIAL

## 2023-08-31 LAB
ALBUMIN SERPL-MCNC: 2.9 G/DL (ref 3.5–5)
ALBUMIN/GLOB SERPL: 0.9 (ref 1.1–2.2)
ALP SERPL-CCNC: 64 U/L (ref 45–117)
ALT SERPL-CCNC: 13 U/L (ref 12–78)
ANION GAP SERPL CALC-SCNC: 6 MMOL/L (ref 5–15)
APPEARANCE FLD: ABNORMAL
AST SERPL-CCNC: 4 U/L (ref 15–37)
BASOPHILS # BLD: 0 K/UL (ref 0–0.1)
BASOPHILS NFR BLD: 0 % (ref 0–1)
BILIRUB SERPL-MCNC: 0.4 MG/DL (ref 0.2–1)
BUN SERPL-MCNC: 9 MG/DL (ref 6–20)
BUN/CREAT SERPL: 14 (ref 12–20)
CALCIUM SERPL-MCNC: 8.4 MG/DL (ref 8.5–10.1)
CHLORIDE SERPL-SCNC: 104 MMOL/L (ref 97–108)
CO2 SERPL-SCNC: 30 MMOL/L (ref 21–32)
COLOR FLD: YELLOW
CREAT SERPL-MCNC: 0.63 MG/DL (ref 0.55–1.02)
DIFFERENTIAL METHOD BLD: ABNORMAL
ECHO BSA: 2.48 M2
ECHO BSA: 2.48 M2
EOSINOPHIL # BLD: 0.1 K/UL (ref 0–0.4)
EOSINOPHIL NFR BLD: 2 % (ref 0–7)
ERYTHROCYTE [DISTWIDTH] IN BLOOD BY AUTOMATED COUNT: 20.6 % (ref 11.5–14.5)
EST. AVERAGE GLUCOSE BLD GHB EST-MCNC: 111 MG/DL
GLOBULIN SER CALC-MCNC: 3.3 G/DL (ref 2–4)
GLUCOSE BLD STRIP.AUTO-MCNC: 101 MG/DL (ref 65–117)
GLUCOSE BLD STRIP.AUTO-MCNC: 112 MG/DL (ref 65–117)
GLUCOSE BLD STRIP.AUTO-MCNC: 113 MG/DL (ref 65–117)
GLUCOSE BLD STRIP.AUTO-MCNC: 114 MG/DL (ref 65–117)
GLUCOSE BLD STRIP.AUTO-MCNC: 97 MG/DL (ref 65–117)
GLUCOSE FLD-MCNC: 99 MG/DL
GLUCOSE SERPL-MCNC: 101 MG/DL (ref 65–100)
HBA1C MFR BLD: 5.5 % (ref 4–5.6)
HCT VFR BLD AUTO: 26.2 % (ref 35–47)
HGB BLD-MCNC: 7.4 G/DL (ref 11.5–16)
IMM GRANULOCYTES # BLD AUTO: 0.1 K/UL (ref 0–0.04)
IMM GRANULOCYTES NFR BLD AUTO: 1 % (ref 0–0.5)
LDH FLD L TO P-CCNC: 105 U/L
LYMPHOCYTES # BLD: 1.1 K/UL (ref 0.8–3.5)
LYMPHOCYTES NFR BLD: 18 % (ref 12–49)
LYMPHOCYTES NFR FLD: 71 %
MAGNESIUM SERPL-MCNC: 1.6 MG/DL (ref 1.6–2.4)
MCH RBC QN AUTO: 20.8 PG (ref 26–34)
MCHC RBC AUTO-ENTMCNC: 28.2 G/DL (ref 30–36.5)
MCV RBC AUTO: 73.6 FL (ref 80–99)
MESOTHL CELL NFR FLD: 9 %
MONOCYTES # BLD: 0.4 K/UL (ref 0–1)
MONOCYTES NFR BLD: 7 % (ref 5–13)
MONOS+MACROS NFR FLD: 20 %
NEUTS SEG # BLD: 4.5 K/UL (ref 1.8–8)
NEUTS SEG NFR BLD: 72 % (ref 32–75)
NRBC # BLD: 0 K/UL (ref 0–0.01)
NRBC BLD-RTO: 0 PER 100 WBC
NUC CELL # FLD: 1577 /CU MM
PLATELET # BLD AUTO: 450 K/UL (ref 150–400)
PMV BLD AUTO: 9.2 FL (ref 8.9–12.9)
POTASSIUM SERPL-SCNC: 3.4 MMOL/L (ref 3.5–5.1)
PROT SERPL-MCNC: 6.2 G/DL (ref 6.4–8.2)
RBC # BLD AUTO: 3.56 M/UL (ref 3.8–5.2)
RBC # FLD: >100 /CU MM
RBC MORPH BLD: ABNORMAL
RBC MORPH BLD: ABNORMAL
SERVICE CMNT-IMP: NORMAL
SODIUM SERPL-SCNC: 140 MMOL/L (ref 136–145)
SPECIMEN SOURCE FLD: ABNORMAL
SPECIMEN SOURCE FLD: NORMAL
SPECIMEN SOURCE FLD: NORMAL
WBC # BLD AUTO: 6.2 K/UL (ref 3.6–11)

## 2023-08-31 PROCEDURE — C1769 GUIDE WIRE: HCPCS | Performed by: INTERNAL MEDICINE

## 2023-08-31 PROCEDURE — 93308 TTE F-UP OR LMTD: CPT

## 2023-08-31 PROCEDURE — 89050 BODY FLUID CELL COUNT: CPT

## 2023-08-31 PROCEDURE — 83735 ASSAY OF MAGNESIUM: CPT

## 2023-08-31 PROCEDURE — 0W9D3ZZ DRAINAGE OF PERICARDIAL CAVITY, PERCUTANEOUS APPROACH: ICD-10-PCS | Performed by: INTERNAL MEDICINE

## 2023-08-31 PROCEDURE — 83036 HEMOGLOBIN GLYCOSYLATED A1C: CPT

## 2023-08-31 PROCEDURE — 99152 MOD SED SAME PHYS/QHP 5/>YRS: CPT | Performed by: INTERNAL MEDICINE

## 2023-08-31 PROCEDURE — 6360000002 HC RX W HCPCS: Performed by: ANESTHESIOLOGY

## 2023-08-31 PROCEDURE — 99153 MOD SED SAME PHYS/QHP EA: CPT | Performed by: INTERNAL MEDICINE

## 2023-08-31 PROCEDURE — 6370000000 HC RX 637 (ALT 250 FOR IP): Performed by: STUDENT IN AN ORGANIZED HEALTH CARE EDUCATION/TRAINING PROGRAM

## 2023-08-31 PROCEDURE — 82962 GLUCOSE BLOOD TEST: CPT

## 2023-08-31 PROCEDURE — 2700000000 HC OXYGEN THERAPY PER DAY

## 2023-08-31 PROCEDURE — 80053 COMPREHEN METABOLIC PANEL: CPT

## 2023-08-31 PROCEDURE — 6370000000 HC RX 637 (ALT 250 FOR IP): Performed by: HOSPITALIST

## 2023-08-31 PROCEDURE — 2500000003 HC RX 250 WO HCPCS: Performed by: INTERNAL MEDICINE

## 2023-08-31 PROCEDURE — C1713 ANCHOR/SCREW BN/BN,TIS/BN: HCPCS | Performed by: INTERNAL MEDICINE

## 2023-08-31 PROCEDURE — 83615 LACTATE (LD) (LDH) ENZYME: CPT

## 2023-08-31 PROCEDURE — 6360000002 HC RX W HCPCS: Performed by: INTERNAL MEDICINE

## 2023-08-31 PROCEDURE — 87070 CULTURE OTHR SPECIMN AEROBIC: CPT

## 2023-08-31 PROCEDURE — 6360000002 HC RX W HCPCS: Performed by: HOSPITALIST

## 2023-08-31 PROCEDURE — 2580000003 HC RX 258: Performed by: HOSPITALIST

## 2023-08-31 PROCEDURE — 2709999900 HC NON-CHARGEABLE SUPPLY: Performed by: INTERNAL MEDICINE

## 2023-08-31 PROCEDURE — 2000000000 HC ICU R&B

## 2023-08-31 PROCEDURE — 82945 GLUCOSE OTHER FLUID: CPT

## 2023-08-31 PROCEDURE — 87206 SMEAR FLUORESCENT/ACID STAI: CPT

## 2023-08-31 PROCEDURE — 33017 PRCRD DRG 6YR+ W/O CGEN CAR: CPT | Performed by: INTERNAL MEDICINE

## 2023-08-31 PROCEDURE — 36415 COLL VENOUS BLD VENIPUNCTURE: CPT

## 2023-08-31 PROCEDURE — 87205 SMEAR GRAM STAIN: CPT

## 2023-08-31 PROCEDURE — 85025 COMPLETE CBC W/AUTO DIFF WBC: CPT

## 2023-08-31 PROCEDURE — 87116 MYCOBACTERIA CULTURE: CPT

## 2023-08-31 RX ORDER — LIDOCAINE HYDROCHLORIDE 10 MG/ML
INJECTION, SOLUTION INFILTRATION; PERINEURAL PRN
Status: DISCONTINUED | OUTPATIENT
Start: 2023-08-31 | End: 2023-08-31 | Stop reason: HOSPADM

## 2023-08-31 RX ORDER — MIDAZOLAM HYDROCHLORIDE 1 MG/ML
INJECTION INTRAMUSCULAR; INTRAVENOUS PRN
Status: DISCONTINUED | OUTPATIENT
Start: 2023-08-31 | End: 2023-08-31 | Stop reason: HOSPADM

## 2023-08-31 RX ORDER — FENTANYL CITRATE 50 UG/ML
INJECTION, SOLUTION INTRAMUSCULAR; INTRAVENOUS PRN
Status: DISCONTINUED | OUTPATIENT
Start: 2023-08-31 | End: 2023-08-31 | Stop reason: HOSPADM

## 2023-08-31 RX ORDER — KETOROLAC TROMETHAMINE 30 MG/ML
15 INJECTION, SOLUTION INTRAMUSCULAR; INTRAVENOUS ONCE
Status: COMPLETED | OUTPATIENT
Start: 2023-08-31 | End: 2023-08-31

## 2023-08-31 RX ADMIN — KETOROLAC TROMETHAMINE 15 MG: 30 INJECTION, SOLUTION INTRAMUSCULAR; INTRAVENOUS at 20:21

## 2023-08-31 RX ADMIN — GUAIFENESIN 600 MG: 600 TABLET, EXTENDED RELEASE ORAL at 20:21

## 2023-08-31 RX ADMIN — HEPARIN SODIUM 5000 UNITS: 5000 INJECTION INTRAVENOUS; SUBCUTANEOUS at 20:22

## 2023-08-31 RX ADMIN — SODIUM CHLORIDE, PRESERVATIVE FREE 10 ML: 5 INJECTION INTRAVENOUS at 08:55

## 2023-08-31 RX ADMIN — DILTIAZEM HYDROCHLORIDE 360 MG: 180 CAPSULE, COATED, EXTENDED RELEASE ORAL at 08:55

## 2023-08-31 RX ADMIN — CLONIDINE HYDROCHLORIDE 0.2 MG: 0.2 TABLET ORAL at 08:55

## 2023-08-31 RX ADMIN — GUAIFENESIN 600 MG: 600 TABLET, EXTENDED RELEASE ORAL at 08:55

## 2023-08-31 RX ADMIN — FERROUS SULFATE TAB 325 MG (65 MG ELEMENTAL FE) 325 MG: 325 (65 FE) TAB at 16:23

## 2023-08-31 RX ADMIN — IPRATROPIUM BROMIDE AND ALBUTEROL SULFATE 1 DOSE: 2.5; .5 SOLUTION RESPIRATORY (INHALATION) at 16:28

## 2023-08-31 RX ADMIN — HYDRALAZINE HYDROCHLORIDE 25 MG: 25 TABLET, FILM COATED ORAL at 20:21

## 2023-08-31 RX ADMIN — HYDRALAZINE HYDROCHLORIDE 25 MG: 25 TABLET, FILM COATED ORAL at 07:24

## 2023-08-31 RX ADMIN — FERROUS SULFATE TAB 325 MG (65 MG ELEMENTAL FE) 325 MG: 325 (65 FE) TAB at 07:24

## 2023-08-31 RX ADMIN — ATORVASTATIN CALCIUM 10 MG: 10 TABLET, FILM COATED ORAL at 20:21

## 2023-08-31 RX ADMIN — CLONIDINE HYDROCHLORIDE 0.2 MG: 0.2 TABLET ORAL at 20:21

## 2023-08-31 ASSESSMENT — PAIN - FUNCTIONAL ASSESSMENT
PAIN_FUNCTIONAL_ASSESSMENT: PREVENTS OR INTERFERES SOME ACTIVE ACTIVITIES AND ADLS
PAIN_FUNCTIONAL_ASSESSMENT: PREVENTS OR INTERFERES SOME ACTIVE ACTIVITIES AND ADLS

## 2023-08-31 ASSESSMENT — PAIN DESCRIPTION - DESCRIPTORS
DESCRIPTORS: SORE
DESCRIPTORS: SORE

## 2023-08-31 ASSESSMENT — PAIN DESCRIPTION - FREQUENCY
FREQUENCY: CONTINUOUS
FREQUENCY: CONTINUOUS

## 2023-08-31 ASSESSMENT — PAIN DESCRIPTION - ORIENTATION
ORIENTATION: MID
ORIENTATION: MID

## 2023-08-31 ASSESSMENT — PAIN DESCRIPTION - PAIN TYPE
TYPE: ACUTE PAIN
TYPE: ACUTE PAIN

## 2023-08-31 ASSESSMENT — PAIN DESCRIPTION - ONSET: ONSET: OTHER (COMMENT)

## 2023-08-31 ASSESSMENT — PAIN DESCRIPTION - LOCATION
LOCATION: CHEST
LOCATION: CHEST

## 2023-08-31 ASSESSMENT — PAIN SCALES - GENERAL
PAINLEVEL_OUTOF10: 0
PAINLEVEL_OUTOF10: 6
PAINLEVEL_OUTOF10: 5

## 2023-08-31 NOTE — CARE COORDINATION
8/31/23 1400--1500    Called [employer] (member services) and spoke with Tianna Pastor from [employer]. She provided pending auth number of [id number]. Tianna Pastor requested me send info to [phone]. Info sent. Time spend obtaining auth: 1 hour.

## 2023-08-31 NOTE — PROCEDURES
Cardiac Catheterization Procedure Note   Patient: Gabe Watts  MRN: [id number]  SSN: [id number]   YOB: 1960 Age: 60 y.o. Sex: female    Date of Procedure: 8/31/2023   Pre-procedure Diagnosis: Pericardial effusion  Post-procedure Diagnosis: Pericardial effusion  Procedure: Pericardiocentesis  :  Dr. Shayy Bernal MD    Assistant(s):  None  Anesthesia: Moderate Sedation   Estimated Blood Loss: Less than 10 mL   Specimens Removed: None  Findings:     Echo guided access obtained from sub xyphoid approach  1000ml of serous fluid removed. Samples sent to lab  Pericardial catheter sutured in place.     Leave catheter to drain spontaneously overnight    Complications: None   Implants:  None  Signed by:  Shayy Bernal MD  8/31/2023  2:13 PM No

## 2023-09-01 LAB
ACID FAST STN SPEC: NEGATIVE
GLUCOSE BLD STRIP.AUTO-MCNC: 103 MG/DL (ref 65–117)
GLUCOSE BLD STRIP.AUTO-MCNC: 110 MG/DL (ref 65–117)
GLUCOSE BLD STRIP.AUTO-MCNC: 110 MG/DL (ref 65–117)
GLUCOSE BLD STRIP.AUTO-MCNC: 125 MG/DL (ref 65–117)
MYCOBACTERIUM SPEC QL CULT: NORMAL
SERVICE CMNT-IMP: ABNORMAL
SERVICE CMNT-IMP: NORMAL
SPECIMEN PREPARATION: NORMAL
SPECIMEN SOURCE: NORMAL

## 2023-09-01 PROCEDURE — 2700000000 HC OXYGEN THERAPY PER DAY

## 2023-09-01 PROCEDURE — 88341 IMHCHEM/IMCYTCHM EA ADD ANTB: CPT

## 2023-09-01 PROCEDURE — 2580000003 HC RX 258: Performed by: HOSPITALIST

## 2023-09-01 PROCEDURE — 88305 TISSUE EXAM BY PATHOLOGIST: CPT

## 2023-09-01 PROCEDURE — 82962 GLUCOSE BLOOD TEST: CPT

## 2023-09-01 PROCEDURE — 6370000000 HC RX 637 (ALT 250 FOR IP): Performed by: HOSPITALIST

## 2023-09-01 PROCEDURE — 6370000000 HC RX 637 (ALT 250 FOR IP): Performed by: STUDENT IN AN ORGANIZED HEALTH CARE EDUCATION/TRAINING PROGRAM

## 2023-09-01 PROCEDURE — 6360000002 HC RX W HCPCS: Performed by: HOSPITALIST

## 2023-09-01 PROCEDURE — 88342 IMHCHEM/IMCYTCHM 1ST ANTB: CPT

## 2023-09-01 PROCEDURE — 2000000000 HC ICU R&B

## 2023-09-01 PROCEDURE — 88112 CYTOPATH CELL ENHANCE TECH: CPT

## 2023-09-01 RX ADMIN — FERROUS SULFATE TAB 325 MG (65 MG ELEMENTAL FE) 325 MG: 325 (65 FE) TAB at 08:30

## 2023-09-01 RX ADMIN — HYDROCHLOROTHIAZIDE 50 MG: 25 TABLET ORAL at 08:30

## 2023-09-01 RX ADMIN — HEPARIN SODIUM 5000 UNITS: 5000 INJECTION INTRAVENOUS; SUBCUTANEOUS at 15:20

## 2023-09-01 RX ADMIN — HYDRALAZINE HYDROCHLORIDE 25 MG: 25 TABLET, FILM COATED ORAL at 21:03

## 2023-09-01 RX ADMIN — HEPARIN SODIUM 5000 UNITS: 5000 INJECTION INTRAVENOUS; SUBCUTANEOUS at 05:23

## 2023-09-01 RX ADMIN — CLONIDINE HYDROCHLORIDE 0.2 MG: 0.2 TABLET ORAL at 21:03

## 2023-09-01 RX ADMIN — ATORVASTATIN CALCIUM 10 MG: 10 TABLET, FILM COATED ORAL at 21:03

## 2023-09-01 RX ADMIN — GUAIFENESIN 600 MG: 600 TABLET, EXTENDED RELEASE ORAL at 21:03

## 2023-09-01 RX ADMIN — FERROUS SULFATE TAB 325 MG (65 MG ELEMENTAL FE) 325 MG: 325 (65 FE) TAB at 18:04

## 2023-09-01 RX ADMIN — HEPARIN SODIUM 5000 UNITS: 5000 INJECTION INTRAVENOUS; SUBCUTANEOUS at 21:30

## 2023-09-01 RX ADMIN — DILTIAZEM HYDROCHLORIDE 360 MG: 180 CAPSULE, COATED, EXTENDED RELEASE ORAL at 08:30

## 2023-09-01 RX ADMIN — HYDRALAZINE HYDROCHLORIDE 25 MG: 25 TABLET, FILM COATED ORAL at 15:20

## 2023-09-01 RX ADMIN — GUAIFENESIN 600 MG: 600 TABLET, EXTENDED RELEASE ORAL at 08:30

## 2023-09-01 RX ADMIN — SODIUM CHLORIDE, PRESERVATIVE FREE 10 ML: 5 INJECTION INTRAVENOUS at 08:31

## 2023-09-01 RX ADMIN — CLONIDINE HYDROCHLORIDE 0.2 MG: 0.2 TABLET ORAL at 08:30

## 2023-09-01 RX ADMIN — SODIUM CHLORIDE, PRESERVATIVE FREE 10 ML: 5 INJECTION INTRAVENOUS at 21:04

## 2023-09-01 ASSESSMENT — PAIN SCALES - GENERAL: PAINLEVEL_OUTOF10: 0

## 2023-09-01 NOTE — CARE COORDINATION
Care Management Initial Assessment       RUR: 18%--moderate  Readmission? No  1st IM letter given? No  1st  letter given: No    Emergency contact: Peter Gibson daughter, [phone]    Patient admitted 8/30 for shortness of breath. Pericardial effusion discovered and drain placed. Patient is in CCU for monitoring. CM met with patient at bedside. Demographics confirmed. Patient, , son, daughter and 10 y/o granddaughter live in a one level home in [facility]. DME: walker. Patient is independent in most ADLs/ambulation, although may need supervision when bathing. Patient likely needs assistance with housework and shopping as well. Patient does not drive. Hx: CAD, CKD, HTN, DM2, anemia, asthma, obesity, renal cell carcinoma of left kidney. PCP and pharmacy confirmed. No history of HH/SNF/IPR.     09/01/23 1032   Service Assessment   Patient Orientation Alert and Oriented   Cognition Alert   History Provided By Patient   Primary Caregiver Self   Support Systems Spouse/Significant Other;Children   PCP Verified by CM Yes  (Dr. Ulises Dillard)   Last Visit to PCP Within last 3 months   Prior Functional Level Assistance with the following:;Shopping;Housework; Bathing   Current Functional Level Assistance with the following:;Bathing;Housework; Shopping   Can patient return to prior living arrangement Yes   Ability to make needs known: Good   Family able to assist with home care needs: Yes   Financial Resources  Resources None   Social/Functional History   Lives With Spouse;Son;Daughter; Other (comment)  (10 y/o granddaughter)   Type of [address] [place] [address]  One level   [address] to enter with rails   Entrance Stairs - Number of Steps 3   ADL Assistance Needs assistance   Bath Supervision   Homemaking Assistance Needs assistance   Ambulation Assistance Independent   Transfer Assistance Independent   Active  No   Occupation On disability   Discharge Planning   Type of

## 2023-09-02 LAB
EKG ATRIAL RATE: 89 BPM
EKG DIAGNOSIS: NORMAL
EKG P AXIS: 49 DEGREES
EKG P-R INTERVAL: 162 MS
EKG Q-T INTERVAL: 368 MS
EKG QRS DURATION: 78 MS
EKG QTC CALCULATION (BAZETT): 447 MS
EKG R AXIS: 12 DEGREES
EKG T AXIS: -26 DEGREES
EKG VENTRICULAR RATE: 89 BPM
GLUCOSE BLD STRIP.AUTO-MCNC: 109 MG/DL (ref 65–117)
GLUCOSE BLD STRIP.AUTO-MCNC: 111 MG/DL (ref 65–117)
GLUCOSE BLD STRIP.AUTO-MCNC: 112 MG/DL (ref 65–117)
GLUCOSE BLD STRIP.AUTO-MCNC: 148 MG/DL (ref 65–117)
SERVICE CMNT-IMP: ABNORMAL
SERVICE CMNT-IMP: NORMAL

## 2023-09-02 PROCEDURE — 2700000000 HC OXYGEN THERAPY PER DAY

## 2023-09-02 PROCEDURE — 82962 GLUCOSE BLOOD TEST: CPT

## 2023-09-02 PROCEDURE — 2000000000 HC ICU R&B

## 2023-09-02 PROCEDURE — 6370000000 HC RX 637 (ALT 250 FOR IP): Performed by: HOSPITALIST

## 2023-09-02 PROCEDURE — 6360000002 HC RX W HCPCS: Performed by: HOSPITALIST

## 2023-09-02 PROCEDURE — 2580000003 HC RX 258: Performed by: HOSPITALIST

## 2023-09-02 PROCEDURE — 6370000000 HC RX 637 (ALT 250 FOR IP): Performed by: STUDENT IN AN ORGANIZED HEALTH CARE EDUCATION/TRAINING PROGRAM

## 2023-09-02 RX ADMIN — DILTIAZEM HYDROCHLORIDE 360 MG: 180 CAPSULE, COATED, EXTENDED RELEASE ORAL at 07:57

## 2023-09-02 RX ADMIN — CLONIDINE HYDROCHLORIDE 0.2 MG: 0.2 TABLET ORAL at 07:57

## 2023-09-02 RX ADMIN — FERROUS SULFATE TAB 325 MG (65 MG ELEMENTAL FE) 325 MG: 325 (65 FE) TAB at 18:59

## 2023-09-02 RX ADMIN — HYDRALAZINE HYDROCHLORIDE 25 MG: 25 TABLET, FILM COATED ORAL at 06:02

## 2023-09-02 RX ADMIN — FERROUS SULFATE TAB 325 MG (65 MG ELEMENTAL FE) 325 MG: 325 (65 FE) TAB at 07:57

## 2023-09-02 RX ADMIN — CLONIDINE HYDROCHLORIDE 0.2 MG: 0.2 TABLET ORAL at 19:52

## 2023-09-02 RX ADMIN — ATORVASTATIN CALCIUM 10 MG: 10 TABLET, FILM COATED ORAL at 19:52

## 2023-09-02 RX ADMIN — HYDROCHLOROTHIAZIDE 50 MG: 25 TABLET ORAL at 07:56

## 2023-09-02 RX ADMIN — GUAIFENESIN 600 MG: 600 TABLET, EXTENDED RELEASE ORAL at 19:52

## 2023-09-02 RX ADMIN — SODIUM CHLORIDE, PRESERVATIVE FREE 10 ML: 5 INJECTION INTRAVENOUS at 19:52

## 2023-09-02 RX ADMIN — HEPARIN SODIUM 5000 UNITS: 5000 INJECTION INTRAVENOUS; SUBCUTANEOUS at 21:56

## 2023-09-02 RX ADMIN — HEPARIN SODIUM 5000 UNITS: 5000 INJECTION INTRAVENOUS; SUBCUTANEOUS at 15:14

## 2023-09-02 RX ADMIN — HEPARIN SODIUM 5000 UNITS: 5000 INJECTION INTRAVENOUS; SUBCUTANEOUS at 06:02

## 2023-09-02 RX ADMIN — GUAIFENESIN 600 MG: 600 TABLET, EXTENDED RELEASE ORAL at 07:56

## 2023-09-02 RX ADMIN — SODIUM CHLORIDE, PRESERVATIVE FREE 10 ML: 5 INJECTION INTRAVENOUS at 07:58

## 2023-09-02 ASSESSMENT — PAIN SCALES - GENERAL
PAINLEVEL_OUTOF10: 0
PAINLEVEL_OUTOF10: 0

## 2023-09-03 LAB
GLUCOSE BLD STRIP.AUTO-MCNC: 112 MG/DL (ref 65–117)
GLUCOSE BLD STRIP.AUTO-MCNC: 125 MG/DL (ref 65–117)
GLUCOSE BLD STRIP.AUTO-MCNC: 127 MG/DL (ref 65–117)
GLUCOSE BLD STRIP.AUTO-MCNC: 128 MG/DL (ref 65–117)
SERVICE CMNT-IMP: ABNORMAL
SERVICE CMNT-IMP: NORMAL

## 2023-09-03 PROCEDURE — 6360000002 HC RX W HCPCS: Performed by: HOSPITALIST

## 2023-09-03 PROCEDURE — 6370000000 HC RX 637 (ALT 250 FOR IP): Performed by: HOSPITALIST

## 2023-09-03 PROCEDURE — 2700000000 HC OXYGEN THERAPY PER DAY

## 2023-09-03 PROCEDURE — 6370000000 HC RX 637 (ALT 250 FOR IP): Performed by: STUDENT IN AN ORGANIZED HEALTH CARE EDUCATION/TRAINING PROGRAM

## 2023-09-03 PROCEDURE — 2000000000 HC ICU R&B

## 2023-09-03 PROCEDURE — 82962 GLUCOSE BLOOD TEST: CPT

## 2023-09-03 PROCEDURE — 2580000003 HC RX 258: Performed by: HOSPITALIST

## 2023-09-03 RX ADMIN — CLONIDINE HYDROCHLORIDE 0.2 MG: 0.2 TABLET ORAL at 07:58

## 2023-09-03 RX ADMIN — HYDRALAZINE HYDROCHLORIDE 25 MG: 25 TABLET, FILM COATED ORAL at 06:05

## 2023-09-03 RX ADMIN — SODIUM CHLORIDE, PRESERVATIVE FREE 10 ML: 5 INJECTION INTRAVENOUS at 07:59

## 2023-09-03 RX ADMIN — HYDROCHLOROTHIAZIDE 50 MG: 25 TABLET ORAL at 07:58

## 2023-09-03 RX ADMIN — FERROUS SULFATE TAB 325 MG (65 MG ELEMENTAL FE) 325 MG: 325 (65 FE) TAB at 18:37

## 2023-09-03 RX ADMIN — HEPARIN SODIUM 5000 UNITS: 5000 INJECTION INTRAVENOUS; SUBCUTANEOUS at 06:05

## 2023-09-03 RX ADMIN — ATORVASTATIN CALCIUM 10 MG: 10 TABLET, FILM COATED ORAL at 20:00

## 2023-09-03 RX ADMIN — HEPARIN SODIUM 5000 UNITS: 5000 INJECTION INTRAVENOUS; SUBCUTANEOUS at 13:29

## 2023-09-03 RX ADMIN — DILTIAZEM HYDROCHLORIDE 360 MG: 180 CAPSULE, COATED, EXTENDED RELEASE ORAL at 07:58

## 2023-09-03 RX ADMIN — CLONIDINE HYDROCHLORIDE 0.2 MG: 0.2 TABLET ORAL at 20:00

## 2023-09-03 RX ADMIN — GUAIFENESIN 600 MG: 600 TABLET, EXTENDED RELEASE ORAL at 20:00

## 2023-09-03 RX ADMIN — FERROUS SULFATE TAB 325 MG (65 MG ELEMENTAL FE) 325 MG: 325 (65 FE) TAB at 07:58

## 2023-09-03 RX ADMIN — POLYETHYLENE GLYCOL 3350 17 G: 17 POWDER, FOR SOLUTION ORAL at 13:29

## 2023-09-03 RX ADMIN — ACETAMINOPHEN 650 MG: 325 TABLET ORAL at 23:40

## 2023-09-03 RX ADMIN — GUAIFENESIN 600 MG: 600 TABLET, EXTENDED RELEASE ORAL at 07:58

## 2023-09-03 RX ADMIN — HEPARIN SODIUM 5000 UNITS: 5000 INJECTION INTRAVENOUS; SUBCUTANEOUS at 21:52

## 2023-09-03 RX ADMIN — SODIUM CHLORIDE, PRESERVATIVE FREE 10 ML: 5 INJECTION INTRAVENOUS at 20:00

## 2023-09-03 ASSESSMENT — PAIN DESCRIPTION - LOCATION: LOCATION: HEAD

## 2023-09-03 ASSESSMENT — PAIN SCALES - GENERAL
PAINLEVEL_OUTOF10: 0
PAINLEVEL_OUTOF10: 0
PAINLEVEL_OUTOF10: 3

## 2023-09-03 ASSESSMENT — PAIN DESCRIPTION - ORIENTATION: ORIENTATION: MID

## 2023-09-03 ASSESSMENT — PAIN DESCRIPTION - DESCRIPTORS: DESCRIPTORS: ACHING

## 2023-09-04 LAB
BACTERIA SPEC CULT: NORMAL
ERYTHROCYTE [DISTWIDTH] IN BLOOD BY AUTOMATED COUNT: 19 % (ref 11.5–14.5)
GLUCOSE BLD STRIP.AUTO-MCNC: 113 MG/DL (ref 65–117)
GLUCOSE BLD STRIP.AUTO-MCNC: 133 MG/DL (ref 65–117)
GLUCOSE BLD STRIP.AUTO-MCNC: 162 MG/DL (ref 65–117)
GRAM STN SPEC: NORMAL
GRAM STN SPEC: NORMAL
HCT VFR BLD AUTO: 29.7 % (ref 35–47)
HGB BLD-MCNC: 8.4 G/DL (ref 11.5–16)
MCH RBC QN AUTO: 20.7 PG (ref 26–34)
MCHC RBC AUTO-ENTMCNC: 28.3 G/DL (ref 30–36.5)
MCV RBC AUTO: 73.2 FL (ref 80–99)
NRBC # BLD: 0 K/UL (ref 0–0.01)
NRBC BLD-RTO: 0 PER 100 WBC
PLATELET # BLD AUTO: 503 K/UL (ref 150–400)
PMV BLD AUTO: 9.3 FL (ref 8.9–12.9)
RBC # BLD AUTO: 4.06 M/UL (ref 3.8–5.2)
SERVICE CMNT-IMP: ABNORMAL
SERVICE CMNT-IMP: ABNORMAL
SERVICE CMNT-IMP: NORMAL
SERVICE CMNT-IMP: NORMAL
WBC # BLD AUTO: 6.4 K/UL (ref 3.6–11)

## 2023-09-04 PROCEDURE — 97165 OT EVAL LOW COMPLEX 30 MIN: CPT

## 2023-09-04 PROCEDURE — 82962 GLUCOSE BLOOD TEST: CPT

## 2023-09-04 PROCEDURE — 97161 PT EVAL LOW COMPLEX 20 MIN: CPT

## 2023-09-04 PROCEDURE — 6370000000 HC RX 637 (ALT 250 FOR IP): Performed by: STUDENT IN AN ORGANIZED HEALTH CARE EDUCATION/TRAINING PROGRAM

## 2023-09-04 PROCEDURE — 2700000000 HC OXYGEN THERAPY PER DAY

## 2023-09-04 PROCEDURE — 2060000000 HC ICU INTERMEDIATE R&B

## 2023-09-04 PROCEDURE — 6360000002 HC RX W HCPCS: Performed by: HOSPITALIST

## 2023-09-04 PROCEDURE — 85027 COMPLETE CBC AUTOMATED: CPT

## 2023-09-04 PROCEDURE — 2580000003 HC RX 258: Performed by: HOSPITALIST

## 2023-09-04 PROCEDURE — 6370000000 HC RX 637 (ALT 250 FOR IP): Performed by: HOSPITALIST

## 2023-09-04 PROCEDURE — 97116 GAIT TRAINING THERAPY: CPT

## 2023-09-04 PROCEDURE — 36415 COLL VENOUS BLD VENIPUNCTURE: CPT

## 2023-09-04 PROCEDURE — 97535 SELF CARE MNGMENT TRAINING: CPT

## 2023-09-04 RX ADMIN — CLONIDINE HYDROCHLORIDE 0.2 MG: 0.2 TABLET ORAL at 21:46

## 2023-09-04 RX ADMIN — HYDRALAZINE HYDROCHLORIDE 25 MG: 25 TABLET, FILM COATED ORAL at 14:10

## 2023-09-04 RX ADMIN — SODIUM CHLORIDE, PRESERVATIVE FREE 10 ML: 5 INJECTION INTRAVENOUS at 08:35

## 2023-09-04 RX ADMIN — HYDROCHLOROTHIAZIDE 50 MG: 25 TABLET ORAL at 08:34

## 2023-09-04 RX ADMIN — ATORVASTATIN CALCIUM 10 MG: 10 TABLET, FILM COATED ORAL at 21:45

## 2023-09-04 RX ADMIN — DILTIAZEM HYDROCHLORIDE 360 MG: 180 CAPSULE, COATED, EXTENDED RELEASE ORAL at 08:34

## 2023-09-04 RX ADMIN — CLONIDINE HYDROCHLORIDE 0.2 MG: 0.2 TABLET ORAL at 08:34

## 2023-09-04 RX ADMIN — GUAIFENESIN 600 MG: 600 TABLET, EXTENDED RELEASE ORAL at 21:45

## 2023-09-04 RX ADMIN — FERROUS SULFATE TAB 325 MG (65 MG ELEMENTAL FE) 325 MG: 325 (65 FE) TAB at 17:08

## 2023-09-04 RX ADMIN — HEPARIN SODIUM 5000 UNITS: 5000 INJECTION INTRAVENOUS; SUBCUTANEOUS at 06:09

## 2023-09-04 RX ADMIN — FERROUS SULFATE TAB 325 MG (65 MG ELEMENTAL FE) 325 MG: 325 (65 FE) TAB at 08:34

## 2023-09-04 RX ADMIN — HEPARIN SODIUM 5000 UNITS: 5000 INJECTION INTRAVENOUS; SUBCUTANEOUS at 14:07

## 2023-09-04 RX ADMIN — HEPARIN SODIUM 5000 UNITS: 5000 INJECTION INTRAVENOUS; SUBCUTANEOUS at 21:45

## 2023-09-04 RX ADMIN — GUAIFENESIN 600 MG: 600 TABLET, EXTENDED RELEASE ORAL at 08:34

## 2023-09-04 RX ADMIN — HYDRALAZINE HYDROCHLORIDE 25 MG: 25 TABLET, FILM COATED ORAL at 21:45

## 2023-09-04 ASSESSMENT — PAIN SCALES - GENERAL
PAINLEVEL_OUTOF10: 0

## 2023-09-04 NOTE — PROCEDURES
0730: Bedside shift change report given to Jessica Tarango RN (oncoming nurse) by Andres Angela RN (offgoing nurse). Report included the following information Nurse Handoff Report, Index, ED Encounter Summary, ED SBAR, Adult Overview, Surgery Report, Intake/Output, MAR, Recent Results, Cardiac Rhythm NSR, Alarm Parameters, and Neuro Assessment. 0830: Dr Felton Foster, Cardiology, assessing pt @ the bedside; MD removed pt's pericardial drain in sterile manner with no complications. MD said pt could downgrade from cardiac standpoint. 0915: Pt transferred off of intensivist care to hospitalist.     Royal Ruelas: PT/OT @ the bedside. 1030: Bedside shift change report given to Shirley Sequeira RN (oncoming nurse) by Jessica Tarango RN (offgoing nurse). Report included the following information Nurse Handoff Report, Index, ED Encounter Summary, ED SBAR, Adult Overview, Surgery Report, Intake/Output, MAR, Recent Results, Cardiac Rhythm NSR, Alarm Parameters, and Neuro Assessment.

## 2023-09-05 ENCOUNTER — APPOINTMENT (OUTPATIENT)
Facility: HOSPITAL | Age: 63
DRG: 461 | End: 2023-09-05
Attending: INTERNAL MEDICINE
Payer: COMMERCIAL

## 2023-09-05 VITALS
HEART RATE: 64 BPM | TEMPERATURE: 98.2 F | RESPIRATION RATE: 17 BRPM | SYSTOLIC BLOOD PRESSURE: 147 MMHG | HEIGHT: 65 IN | OXYGEN SATURATION: 93 % | WEIGHT: 269.4 LBS | DIASTOLIC BLOOD PRESSURE: 64 MMHG | BODY MASS INDEX: 44.89 KG/M2

## 2023-09-05 PROBLEM — R06.02 SHORTNESS OF BREATH: Status: RESOLVED | Noted: 2023-08-30 | Resolved: 2023-09-05

## 2023-09-05 LAB
ECHO BSA: 2.48 M2
ERYTHROCYTE [DISTWIDTH] IN BLOOD BY AUTOMATED COUNT: 19.1 % (ref 11.5–14.5)
GLUCOSE BLD STRIP.AUTO-MCNC: 103 MG/DL (ref 65–117)
GLUCOSE BLD STRIP.AUTO-MCNC: 115 MG/DL (ref 65–117)
GLUCOSE BLD STRIP.AUTO-MCNC: 129 MG/DL (ref 65–117)
HCT VFR BLD AUTO: 32.4 % (ref 35–47)
HGB BLD-MCNC: 9.1 G/DL (ref 11.5–16)
MCH RBC QN AUTO: 20.7 PG (ref 26–34)
MCHC RBC AUTO-ENTMCNC: 28.1 G/DL (ref 30–36.5)
MCV RBC AUTO: 73.6 FL (ref 80–99)
NRBC # BLD: 0 K/UL (ref 0–0.01)
NRBC BLD-RTO: 0 PER 100 WBC
PLATELET # BLD AUTO: 516 K/UL (ref 150–400)
PMV BLD AUTO: 9.1 FL (ref 8.9–12.9)
RBC # BLD AUTO: 4.4 M/UL (ref 3.8–5.2)
SERVICE CMNT-IMP: ABNORMAL
SERVICE CMNT-IMP: NORMAL
SERVICE CMNT-IMP: NORMAL
WBC # BLD AUTO: 6.1 K/UL (ref 3.6–11)

## 2023-09-05 PROCEDURE — 93308 TTE F-UP OR LMTD: CPT

## 2023-09-05 PROCEDURE — 36415 COLL VENOUS BLD VENIPUNCTURE: CPT

## 2023-09-05 PROCEDURE — 97116 GAIT TRAINING THERAPY: CPT

## 2023-09-05 PROCEDURE — 6360000002 HC RX W HCPCS: Performed by: HOSPITALIST

## 2023-09-05 PROCEDURE — 2700000000 HC OXYGEN THERAPY PER DAY

## 2023-09-05 PROCEDURE — 82962 GLUCOSE BLOOD TEST: CPT

## 2023-09-05 PROCEDURE — 6370000000 HC RX 637 (ALT 250 FOR IP): Performed by: STUDENT IN AN ORGANIZED HEALTH CARE EDUCATION/TRAINING PROGRAM

## 2023-09-05 PROCEDURE — 6370000000 HC RX 637 (ALT 250 FOR IP): Performed by: HOSPITALIST

## 2023-09-05 PROCEDURE — 97530 THERAPEUTIC ACTIVITIES: CPT

## 2023-09-05 PROCEDURE — 85027 COMPLETE CBC AUTOMATED: CPT

## 2023-09-05 RX ADMIN — HYDRALAZINE HYDROCHLORIDE 25 MG: 25 TABLET, FILM COATED ORAL at 14:59

## 2023-09-05 RX ADMIN — HEPARIN SODIUM 5000 UNITS: 5000 INJECTION INTRAVENOUS; SUBCUTANEOUS at 14:59

## 2023-09-05 RX ADMIN — HEPARIN SODIUM 5000 UNITS: 5000 INJECTION INTRAVENOUS; SUBCUTANEOUS at 05:04

## 2023-09-05 RX ADMIN — HYDROCHLOROTHIAZIDE 50 MG: 25 TABLET ORAL at 08:21

## 2023-09-05 RX ADMIN — CLONIDINE HYDROCHLORIDE 0.2 MG: 0.2 TABLET ORAL at 08:21

## 2023-09-05 RX ADMIN — FERROUS SULFATE TAB 325 MG (65 MG ELEMENTAL FE) 325 MG: 325 (65 FE) TAB at 08:21

## 2023-09-05 RX ADMIN — GUAIFENESIN 600 MG: 600 TABLET, EXTENDED RELEASE ORAL at 08:21

## 2023-09-05 RX ADMIN — DILTIAZEM HYDROCHLORIDE 360 MG: 180 CAPSULE, COATED, EXTENDED RELEASE ORAL at 08:21

## 2023-09-05 NOTE — PLAN OF CARE
Problem: Occupational Therapy - Adult  Goal: By Discharge: Performs self-care activities at highest level of function for planned discharge setting. See evaluation for individualized goals. Description: FUNCTIONAL STATUS PRIOR TO ADMISSION:  At baseline, she is ambulatory with occasional rollator use, gets assist from her daughter for lower body ADLs (ibeth bathing & perineal hygiene), assist for all IADLs. HOME SUPPORT: Patient lived with family who provide up to Lou Platasammy PAZ Occupational Therapy Goals:  Initiated 9/4/2023  1. Patient will perform grooming at the sink with Supervision within 7 day(s). 2.  Patient will perform bathing with Minimal Assist within 7 day(s). 3.  Patient will perform lower body dressing with Minimal Assist within 7 day(s). 4.  Patient will perform toilet transfers with Supervision  within 7 day(s). 5.  Patient will perform all aspects of toileting with Minimal Assist within 7 day(s). 6.  Patient will participate in upper extremity therapeutic exercise/activities with Supervision for 10 minutes with VSS & rest breaks PRN within 7 day(s). 7.  Patient will utilize energy conservation techniques during functional activities with verbal and visual cues within 7 day(s). Outcome: Progressing   OCCUPATIONAL THERAPY TREATMENT  Patient: Nelson Liner (33 y.o. female)  Date: 9/5/2023  Primary Diagnosis: Shortness of breath [R06.02]  Procedure(s) (LRB):  Pericardiocentesis (N/A) 5 Days Post-Op   Precautions: Fall Risk (BP <180/90)                Chart, occupational therapy assessment, plan of care, and goals were reviewed. ASSESSMENT  Patient continues to benefit from skilled OT services and is progressing towards goals. Pt received seated in chair, on 0.5L/min, weaned to room air and able to tolerate short distance ambulation on room air. Difficult to achieve accurate SpO2 reading during mobility but 94% once seated in chair.  Heavily reviewed pacing and energy conservation
Problem: Physical Therapy - Adult  Goal: By Discharge: Performs mobility at highest level of function for planned discharge setting. See evaluation for individualized goals. Description: FUNCTIONAL STATUS PRIOR TO ADMISSION: Patient was modified independent using a rollator for functional mobility. She notes that she has been limited by LEIGH with household ambulation, unable to walk to the mailbox for several months. No home O2 prior to admission. HOME SUPPORT PRIOR TO ADMISSION: The patient lived with her family and required minimal assistance for hygiene and iADLs. Daughter assists with shiraz-care and bathing and some dressing. Physical Therapy Goals  Initiated 9/4/2023  1. Patient will move from supine to sit and sit to supine and roll side to side in bed with modified independence within 7 day(s). 2.  Patient will perform sit to stand with modified independence within 7 day(s). 3.  Patient will transfer from bed to chair and chair to bed with modified independence using the least restrictive device within 7 day(s). 4.  Patient will ambulate with modified independence for 150 feet with the least restrictive device within 7 day(s). 5.  Patient will ascend/descend 4 stairs with 1 handrail(s) with modified independence within 7 day(s). Outcome: Progressing   PHYSICAL THERAPY TREATMENT    Patient: Eduardo Almeida (1 y.o. female)  Date: 9/5/2023  Diagnosis: Shortness of breath [R06.02] Shortness of breath  Procedure(s) (LRB):  Pericardiocentesis (N/A) 5 Days Post-Op  Precautions: Fall Risk (BP <180/90)                    ASSESSMENT:  Patient continues to benefit from skilled PT services and is progressing towards goals. Patient received supine in bed, agreeable to therapy. Resting on 0.5L/min, weaned to room air and able to tolerate short distance ambulation on room air. Difficult to achieve SpO2 reading during mobility but 94% once seated in chair.  Became dyspneic with short distance ambulation
Problem: Safety - Adult  Goal: Free from fall injury  Outcome: Progressing
Problem: Safety - Adult  Goal: Free from fall injury  Outcome: Progressing  Flowsheets (Taken 8/31/2023 0802)  Free From Fall Injury: Instruct family/caregiver on patient safety
deterioration, or improvement   Collaborate with multidisciplinary team to address chronic and comorbid conditions and prevent exacerbation or deterioration  Taken 8/31/2023 1600 by [address] - Patient's Chronic Conditions and Co-Morbidity Symptoms are Monitored and Maintained or Improved: Monitor and assess patient's chronic conditions and comorbid symptoms for stability, deterioration, or improvement
Expanded or extensive additional review of patient history:   Lives With: Spouse, Son, Daughter, Other (comment) (granddaughter)  Type of Home: House  Home Layout: One level  Home Access: Stairs to enter with rails     Entrance Stairs - Rails: None  Bathroom Shower/Tub: Tub/Shower unit           Home Equipment: Rollator  Has the patient had two or more falls in the past year or any fall with injury in the past year?: No        Hand Dominance: right     EXAMINATION OF PERFORMANCE DEFICITS:    Cognitive/Behavioral Status:  Orientation  Overall Orientation Status: Within Normal Limits  Orientation Level: Oriented X4  Cognition  Overall Cognitive Status: Exceptions  Arousal/Alertness: Appropriate responses to stimuli  Following Commands: Follows all commands without difficulty  Attention Span: Appears intact  Memory: Appears intact  Safety Judgement: Decreased awareness of need for safety  Problem Solving: Assistance required to generate solutions;Assistance required to identify errors made;Assistance required to correct errors made  Insights: Decreased awareness of deficits  Initiation: Does not require cues  Sequencing: Does not require cues    Skin: Appears intact    Edema: Generalized & BLE swelling    Hearing:   Hearing  Hearing: Within functional limits    Vision/Perceptual:    Vision - Basic Assessment  Visual History: No significant visual history  Patient Visual Report: No visual complaint reported. Visual Field Cut: No  Oculo Motor Control:  WNL     Vision  Vision: Within Functional Limits  Perception  Overall Perceptual Status: WFL    Range of Motion:   AROM: Generally decreased, functional  PROM: Within functional limits      Strength:  Strength: Generally decreased, functional      Coordination:  Coordination: Generally decreased, functional     Coordination: Generally decreased, functional      Tone & Sensation:   Tone: Normal  Sensation: Intact          Functional Mobility and Transfers for
the patient's performance over the preceding 24-48 hours is important, but occasionally longer periods will be relevant. 6. Middle categories imply that the patient supplies over 50 per cent of the effort. 7. Use of aids to be independent is allowed. Score Interpretation (from [address] [place] [address])    Independent   60-79 Minimally independent   40-59 Partially dependent   20-39 Very dependent   <20 Totally dependent     -Estrellita Hayden., BarthelANGEL. (1965). Functional evaluation: the Barthel Index. [address] ([address] (). The Barthel activities of daily living index: self-reporting versus actual performance in the old (> or = 75 years). Journal of [facility] 45(7), [address], JRAULITOF, Kali Pradhan., Edwin Troncoso. (1999). Measuring the change in disability after inpatient rehabilitation; comparison of the responsiveness of the Barthel Index and Functional [place] Measure. Journal of Neurology, Neurosurgery, and Psychiatry, 66(4), 935-039. Natalie Quinones, N.MCKENNA.BURTON, LEAH Brooks, & Janae Lin, M.A. (2004) Assessment of post-stroke quality of life in cost-effectiveness studies: The usefulness of the Barthel Index and the EuroQoL-5D. Quality of Life Research, 13, 427-43                                                                                                                                                                                                                                 Pain Ratin/10   Pain Intervention(s):        Activity Tolerance:   Fair , requires rest breaks, observed shortness of breath on exertion, and desaturates with activity and requires oxygen    After treatment:   Patient left in no apparent distress sitting up in chair and Call bell within reach    COMMUNICATION/EDUCATION:   The patient's plan of care was discussed with: occupational therapist and registered nurse         Thank you for this

## 2023-09-05 NOTE — DISCHARGE SUMMARY
Discharge Summary       PATIENT ID: Katerin Solano  MRN: [id number]   YOB: 1960    DATE OF ADMISSION: 8/30/2023  1:58 PM    DATE OF DISCHARGE: 9/5/2023   PRIMARY CARE PROVIDER: Eva Avina DO     ATTENDING PHYSICIAN: [place]  DISCHARGING PROVIDER: Gurwinder Dye MD    To contact this individual call [phone] and ask the  to page. If unavailable ask to be transferred the Adult Hospitalist Department. CONSULTATIONS: IP CONSULT TO CARDIOLOGY  IP CONSULT TO UROLOGY  IP CONSULT HOME HEALTH    PROCEDURES/SURGERIES: Procedure(s):  Pericardiocentesis     ADMITTING DIAGNOSES & HOSPITAL COURSE:   Pericardial effusion with tamponade  HTN  Type 2 DM  CAD  Morbid obesity  Anemia of chronic disease  History of RCC    60 y.o. female with PMH significant for pericardial effusion, renal carcinoma of left kidney (recent dx in June 2023), CAD, CKD, HTN, T2DM, anemia of chronic disease, asthma, and morbid obesity who is directly admitted from [facility] after she presented with progressive shortness of breath 2 weeks duration. Patient reported that she was diagnosed with pericardial effusion and renal carcinoma of left kidney in June 2023. She stated that her shortness of breath progressively worse more with exertion. Underwent pericardiocentesis, drain out 9/4, repeat echo 9/5 with minimal trace effusion (d/w cardiology). Fluid cytology pending.     DISCHARGE DIAGNOSES / PLAN:        PENDING TEST RESULTS:   At the time of discharge the following test results are still pending: pericardial fluid cytology    FOLLOW UP APPOINTMENTS:    Follow-up Information       Follow up With Specialties Details Why Contact Info    Андрей Durand DO Internal Medicine, Pediatrics Schedule an appointment as soon as possible for a visit for hospital discharge follow-up [address] [phone]      [facility]  Schedule an appointment

## 2023-09-05 NOTE — CARE COORDINATION
Transition of Care Plan:    RUR: 15%  Prior Level of Functioning: independent with minimal assistance  Disposition: home w/HH PT/OT with [address]  Date [address] offered: 09/05/2023  Date [address] received: 09/05/2023  Follow up appointments: PCP and cardiology  DME needed: N/A  Transportation at discharge: in car with daughter  Caregiver Contact: Eder Ruiz, daughter, [phone]  Discharge Caregiver contacted prior to discharge? yes  Care Conference needed? no  Barriers to discharge:  home health acceptance    Patient admitted 8/30 for shortness of breath. Pericardial effusion discovered and drain placed. Patient now ready for discharge and is being recommended for [facility] PT/OT. CM met with patient at bedside. Patient has no preference for home health companies. CM submitted numerous referrals via [facility]. CM will continue to follow. [address] [place] [address],  [address] [place] accepted patient for home health services.     Filipe Leo, [address]  [phone]

## 2023-09-05 NOTE — DISCHARGE INSTRUCTIONS
You were admitted for fluid around the heart which has been drained. The fluid analysis studies are pending still. Please follow-up with Cardiology at [facility], they will need to access the fluid analysis studies on follow-up.

## 2023-09-06 ENCOUNTER — CLINICAL DOCUMENTATION (OUTPATIENT)
Age: 63
End: 2023-09-06

## 2023-09-06 NOTE — PROGRESS NOTES
Scheduled with nurse practitioner for a new patient sleep consult on 9/7/2023. Unable to confirm appointment as home number listed is not in service and cell phone voicemail was full.

## 2023-10-13 LAB
ACID FAST STN SPEC: NEGATIVE
MYCOBACTERIUM SPEC QL CULT: NEGATIVE
SPECIMEN PREPARATION: NORMAL
SPECIMEN SOURCE: NORMAL

## (undated) DEVICE — KIT PERICARDCENT W/ 6FR PGTL CATH DIL ANES NDL LUERLOCK SYR

## (undated) DEVICE — SUTURE PERMA-HAND SZ 2-0 L30IN NONABSORBABLE BLK L26MM SH K833H

## (undated) DEVICE — KIT MFLD ISOLATN NACL CNTRST PRT TBNG SPIK W/ PRSS TRNSDUC

## (undated) DEVICE — CONTAINER,SPECIMEN,4OZ,OR STRL: Brand: MEDLINE

## (undated) DEVICE — HEART CATH-MRMC: Brand: MEDLINE INDUSTRIES, INC.

## (undated) DEVICE — KIT HND CTRL 3 W STPCOCK ROT END 54IN PREM HI PRSS TBNG AT

## (undated) DEVICE — STIFFEN MICRO-INTRODUCER KIT: Brand: STIFFEN MICRO-INTRODUCER KIT

## (undated) DEVICE — ADAPTER CLR ROT AIRLESS W/ SHERLOCK CONN M LUER

## (undated) DEVICE — 3M™ TEGADERM™ CHG DRESSING 25/CARTON 4 CARTONS/CASE 1659: Brand: TEGADERM™

## (undated) DEVICE — ANGIOGRAPHY KIT

## (undated) DEVICE — PACK PROCEDURE SURG HRT CATH

## (undated) DEVICE — KIT MED IMAG CNTRST AGNT W/ IOPAMIDOL REUSE